# Patient Record
Sex: MALE | Race: OTHER | HISPANIC OR LATINO | URBAN - METROPOLITAN AREA
[De-identification: names, ages, dates, MRNs, and addresses within clinical notes are randomized per-mention and may not be internally consistent; named-entity substitution may affect disease eponyms.]

---

## 2018-08-16 ENCOUNTER — INPATIENT (INPATIENT)
Facility: HOSPITAL | Age: 48
LOS: 3 days | Discharge: HOME | DRG: 557 | End: 2018-08-20
Attending: STUDENT IN AN ORGANIZED HEALTH CARE EDUCATION/TRAINING PROGRAM | Admitting: HOSPITALIST
Payer: MEDICAID

## 2018-08-16 VITALS
SYSTOLIC BLOOD PRESSURE: 115 MMHG | OXYGEN SATURATION: 96 % | HEIGHT: 66 IN | HEART RATE: 64 BPM | DIASTOLIC BLOOD PRESSURE: 68 MMHG | RESPIRATION RATE: 14 BRPM | WEIGHT: 190.04 LBS | TEMPERATURE: 98 F

## 2018-08-16 LAB
ANION GAP SERPL CALC-SCNC: 15 MMOL/L — SIGNIFICANT CHANGE UP (ref 5–17)
APAP SERPL-MCNC: <2 UG/ML — LOW (ref 10–30)
BASOPHILS # BLD AUTO: 0.02 K/UL — SIGNIFICANT CHANGE UP (ref 0–0.2)
BASOPHILS NFR BLD AUTO: 0.1 % — SIGNIFICANT CHANGE UP (ref 0–2)
BUN SERPL-MCNC: 6 MG/DL — LOW (ref 7–23)
CALCIUM SERPL-MCNC: 8.4 MG/DL — LOW (ref 8.5–10.1)
CHLORIDE SERPL-SCNC: 95 MMOL/L — LOW (ref 96–108)
CO2 SERPL-SCNC: 19 MMOL/L — LOW (ref 22–31)
CREAT SERPL-MCNC: 0.78 MG/DL — SIGNIFICANT CHANGE UP (ref 0.5–1.3)
EOSINOPHIL # BLD AUTO: 0 K/UL — SIGNIFICANT CHANGE UP (ref 0–0.5)
EOSINOPHIL NFR BLD AUTO: 0 % — SIGNIFICANT CHANGE UP (ref 0–6)
ETHANOL SERPL-MCNC: <10 MG/DL — SIGNIFICANT CHANGE UP (ref 0–10)
GLUCOSE SERPL-MCNC: 114 MG/DL — HIGH (ref 70–99)
HCT VFR BLD CALC: 41.6 % — SIGNIFICANT CHANGE UP (ref 39–50)
HGB BLD-MCNC: 15 G/DL — SIGNIFICANT CHANGE UP (ref 13–17)
IMM GRANULOCYTES NFR BLD AUTO: 0.3 % — SIGNIFICANT CHANGE UP (ref 0–1.5)
LYMPHOCYTES # BLD AUTO: 1.24 K/UL — SIGNIFICANT CHANGE UP (ref 1–3.3)
LYMPHOCYTES # BLD AUTO: 8.5 % — LOW (ref 13–44)
MCHC RBC-ENTMCNC: 30.4 PG — SIGNIFICANT CHANGE UP (ref 27–34)
MCHC RBC-ENTMCNC: 36.1 GM/DL — HIGH (ref 32–36)
MCV RBC AUTO: 84.4 FL — SIGNIFICANT CHANGE UP (ref 80–100)
MONOCYTES # BLD AUTO: 0.93 K/UL — HIGH (ref 0–0.9)
MONOCYTES NFR BLD AUTO: 6.4 % — SIGNIFICANT CHANGE UP (ref 2–14)
NEUTROPHILS # BLD AUTO: 12.3 K/UL — HIGH (ref 1.8–7.4)
NEUTROPHILS NFR BLD AUTO: 84.7 % — HIGH (ref 43–77)
NRBC # BLD: 0 /100 WBCS — SIGNIFICANT CHANGE UP (ref 0–0)
PLATELET # BLD AUTO: 285 K/UL — SIGNIFICANT CHANGE UP (ref 150–400)
POTASSIUM SERPL-MCNC: 3.5 MMOL/L — SIGNIFICANT CHANGE UP (ref 3.5–5.3)
POTASSIUM SERPL-SCNC: 3.5 MMOL/L — SIGNIFICANT CHANGE UP (ref 3.5–5.3)
RBC # BLD: 4.93 M/UL — SIGNIFICANT CHANGE UP (ref 4.2–5.8)
RBC # FLD: 13.2 % — SIGNIFICANT CHANGE UP (ref 10.3–14.5)
SALICYLATES SERPL-MCNC: <1.7 MG/DL — SIGNIFICANT CHANGE UP (ref 2.8–20)
SODIUM SERPL-SCNC: 129 MMOL/L — LOW (ref 135–145)
WBC # BLD: 14.54 K/UL — HIGH (ref 3.8–10.5)
WBC # FLD AUTO: 14.54 K/UL — HIGH (ref 3.8–10.5)

## 2018-08-16 PROCEDURE — 70450 CT HEAD/BRAIN W/O DYE: CPT | Mod: 26

## 2018-08-16 PROCEDURE — 93010 ELECTROCARDIOGRAM REPORT: CPT

## 2018-08-16 RX ORDER — SODIUM CHLORIDE 9 MG/ML
1000 INJECTION INTRAMUSCULAR; INTRAVENOUS; SUBCUTANEOUS ONCE
Qty: 0 | Refills: 0 | Status: COMPLETED | OUTPATIENT
Start: 2018-08-16 | End: 2018-08-16

## 2018-08-16 RX ADMIN — SODIUM CHLORIDE 1000 MILLILITER(S): 9 INJECTION INTRAMUSCULAR; INTRAVENOUS; SUBCUTANEOUS at 23:30

## 2018-08-16 NOTE — ED PROVIDER NOTE - OBJECTIVE STATEMENT
48yo male who presents with AMS since 4am. according to the brother pt got home from work at 1am and they noticed soon after pt was not himself, went to bed and was sleeping all day, urinated on himself no vomiting, unknown drug ingestion, pt is being uncooperative and nonverbal so no further hx is able to be obtained

## 2018-08-16 NOTE — ED PROVIDER NOTE - PROGRESS NOTE DETAILS
case discussed with PA, pt needs emergent LP and with pt being agitated safest attempt is to intubate pt so LP can be performed. pt to be intubated and then LP attempted. after several attempts at LP, it was unsuccessful, pt was coughing and bucking the vent, moving too much in order to get a clean tap, will attempt again in the ICU or call anesthesia to perform the LP

## 2018-08-16 NOTE — ED PROVIDER NOTE - CARE PLAN
Principal Discharge DX:	Altered mental status, unspecified Principal Discharge DX:	Altered mental status, unspecified  Secondary Diagnosis:	Rhabdomyolysis

## 2018-08-16 NOTE — ED ADULT TRIAGE NOTE - CHIEF COMPLAINT QUOTE
"I think someone gave him something."  per family, pt came home late from work (4am when normally around 1am) and "slept all day", pt is confused, urinated on self

## 2018-08-17 DIAGNOSIS — R41.82 ALTERED MENTAL STATUS, UNSPECIFIED: ICD-10-CM

## 2018-08-17 DIAGNOSIS — E87.1 HYPO-OSMOLALITY AND HYPONATREMIA: ICD-10-CM

## 2018-08-17 DIAGNOSIS — D72.829 ELEVATED WHITE BLOOD CELL COUNT, UNSPECIFIED: ICD-10-CM

## 2018-08-17 DIAGNOSIS — J01.20 ACUTE ETHMOIDAL SINUSITIS, UNSPECIFIED: ICD-10-CM

## 2018-08-17 DIAGNOSIS — F32.9 MAJOR DEPRESSIVE DISORDER, SINGLE EPISODE, UNSPECIFIED: ICD-10-CM

## 2018-08-17 DIAGNOSIS — J96.00 ACUTE RESPIRATORY FAILURE, UNSPECIFIED WHETHER WITH HYPOXIA OR HYPERCAPNIA: ICD-10-CM

## 2018-08-17 DIAGNOSIS — M62.82 RHABDOMYOLYSIS: ICD-10-CM

## 2018-08-17 DIAGNOSIS — Z29.9 ENCOUNTER FOR PROPHYLACTIC MEASURES, UNSPECIFIED: ICD-10-CM

## 2018-08-17 LAB
ALBUMIN SERPL ELPH-MCNC: 3.1 G/DL — LOW (ref 3.3–5)
ALBUMIN SERPL ELPH-MCNC: 3.1 G/DL — LOW (ref 3.3–5)
ALP SERPL-CCNC: 71 U/L — SIGNIFICANT CHANGE UP (ref 40–120)
ALP SERPL-CCNC: 72 U/L — SIGNIFICANT CHANGE UP (ref 40–120)
ALT FLD-CCNC: 50 U/L — SIGNIFICANT CHANGE UP (ref 12–78)
ALT FLD-CCNC: 54 U/L — SIGNIFICANT CHANGE UP (ref 12–78)
AMPHET UR-MCNC: NEGATIVE — SIGNIFICANT CHANGE UP
ANION GAP SERPL CALC-SCNC: 10 MMOL/L — SIGNIFICANT CHANGE UP (ref 5–17)
ANION GAP SERPL CALC-SCNC: 9 MMOL/L — SIGNIFICANT CHANGE UP (ref 5–17)
APPEARANCE UR: CLEAR — SIGNIFICANT CHANGE UP
APTT BLD: 28.6 SEC — SIGNIFICANT CHANGE UP (ref 27.5–37.4)
AST SERPL-CCNC: 83 U/L — HIGH (ref 15–37)
AST SERPL-CCNC: 97 U/L — HIGH (ref 15–37)
BACTERIA # UR AUTO: ABNORMAL
BARBITURATES UR SCN-MCNC: NEGATIVE — SIGNIFICANT CHANGE UP
BASE EXCESS BLDA CALC-SCNC: -5.2 MMOL/L — LOW (ref -2–2)
BASOPHILS # BLD AUTO: 0.01 K/UL — SIGNIFICANT CHANGE UP (ref 0–0.2)
BASOPHILS NFR BLD AUTO: 0.1 % — SIGNIFICANT CHANGE UP (ref 0–2)
BENZODIAZ UR-MCNC: NEGATIVE — SIGNIFICANT CHANGE UP
BILIRUB SERPL-MCNC: 0.9 MG/DL — SIGNIFICANT CHANGE UP (ref 0.2–1.2)
BILIRUB SERPL-MCNC: 1.5 MG/DL — HIGH (ref 0.2–1.2)
BILIRUB UR-MCNC: NEGATIVE — SIGNIFICANT CHANGE UP
BLOOD GAS COMMENTS ARTERIAL: SIGNIFICANT CHANGE UP
BUN SERPL-MCNC: 4 MG/DL — LOW (ref 7–23)
BUN SERPL-MCNC: 5 MG/DL — LOW (ref 7–23)
CALCIUM SERPL-MCNC: 7.2 MG/DL — LOW (ref 8.5–10.1)
CALCIUM SERPL-MCNC: 7.7 MG/DL — LOW (ref 8.5–10.1)
CHLORIDE SERPL-SCNC: 111 MMOL/L — HIGH (ref 96–108)
CHLORIDE SERPL-SCNC: 112 MMOL/L — HIGH (ref 96–108)
CK MB CFR SERPL CALC: 21.5 NG/ML — HIGH (ref 0–3.6)
CK SERPL-CCNC: 5138 U/L — HIGH (ref 26–308)
CK SERPL-CCNC: 5150 U/L — HIGH (ref 26–308)
CO2 SERPL-SCNC: 20 MMOL/L — LOW (ref 22–31)
CO2 SERPL-SCNC: 22 MMOL/L — SIGNIFICANT CHANGE UP (ref 22–31)
COCAINE METAB.OTHER UR-MCNC: NEGATIVE — SIGNIFICANT CHANGE UP
COLOR SPEC: SIGNIFICANT CHANGE UP
CREAT SERPL-MCNC: 0.63 MG/DL — SIGNIFICANT CHANGE UP (ref 0.5–1.3)
CREAT SERPL-MCNC: 0.66 MG/DL — SIGNIFICANT CHANGE UP (ref 0.5–1.3)
DIFF PNL FLD: ABNORMAL
EOSINOPHIL # BLD AUTO: 0 K/UL — SIGNIFICANT CHANGE UP (ref 0–0.5)
EOSINOPHIL NFR BLD AUTO: 0 % — SIGNIFICANT CHANGE UP (ref 0–6)
EPI CELLS # UR: SIGNIFICANT CHANGE UP
GLUCOSE CSF-MCNC: 64 MG/DL — SIGNIFICANT CHANGE UP (ref 40–70)
GLUCOSE SERPL-MCNC: 103 MG/DL — HIGH (ref 70–99)
GLUCOSE SERPL-MCNC: 120 MG/DL — HIGH (ref 70–99)
GLUCOSE UR QL: NEGATIVE — SIGNIFICANT CHANGE UP
GRAM STN FLD: SIGNIFICANT CHANGE UP
HCO3 BLDA-SCNC: 20 MMOL/L — LOW (ref 23–27)
HCT VFR BLD CALC: 39.7 % — SIGNIFICANT CHANGE UP (ref 39–50)
HGB BLD-MCNC: 14.3 G/DL — SIGNIFICANT CHANGE UP (ref 13–17)
HOROWITZ INDEX BLDA+IHG-RTO: SIGNIFICANT CHANGE UP
IMM GRANULOCYTES NFR BLD AUTO: 0.5 % — SIGNIFICANT CHANGE UP (ref 0–1.5)
INR BLD: 1.1 RATIO — SIGNIFICANT CHANGE UP (ref 0.88–1.16)
KETONES UR-MCNC: ABNORMAL
LACTATE SERPL-SCNC: 1.3 MMOL/L — SIGNIFICANT CHANGE UP (ref 0.7–2)
LEUKOCYTE ESTERASE UR-ACNC: ABNORMAL
LYMPHOCYTES # BLD AUTO: 0.29 K/UL — LOW (ref 1–3.3)
LYMPHOCYTES # BLD AUTO: 2.5 % — LOW (ref 13–44)
MAGNESIUM SERPL-MCNC: 2.6 MG/DL — SIGNIFICANT CHANGE UP (ref 1.6–2.6)
MCHC RBC-ENTMCNC: 30.9 PG — SIGNIFICANT CHANGE UP (ref 27–34)
MCHC RBC-ENTMCNC: 36 GM/DL — SIGNIFICANT CHANGE UP (ref 32–36)
MCV RBC AUTO: 85.7 FL — SIGNIFICANT CHANGE UP (ref 80–100)
METHADONE UR-MCNC: NEGATIVE — SIGNIFICANT CHANGE UP
MONOCYTES # BLD AUTO: 0.36 K/UL — SIGNIFICANT CHANGE UP (ref 0–0.9)
MONOCYTES NFR BLD AUTO: 3 % — SIGNIFICANT CHANGE UP (ref 2–14)
NEUTROPHILS # BLD AUTO: 11.1 K/UL — HIGH (ref 1.8–7.4)
NEUTROPHILS NFR BLD AUTO: 93.9 % — HIGH (ref 43–77)
NITRITE UR-MCNC: NEGATIVE — SIGNIFICANT CHANGE UP
OPIATES UR-MCNC: NEGATIVE — SIGNIFICANT CHANGE UP
OSMOLALITY SERPL: 289 MOS/KG — SIGNIFICANT CHANGE UP (ref 275–300)
PCO2 BLDA: 33 MMHG — SIGNIFICANT CHANGE UP (ref 32–46)
PCP SPEC-MCNC: SIGNIFICANT CHANGE UP
PCP UR-MCNC: NEGATIVE — SIGNIFICANT CHANGE UP
PH BLDA: 7.38 — SIGNIFICANT CHANGE UP (ref 7.35–7.45)
PH UR: 7 — SIGNIFICANT CHANGE UP (ref 5–8)
PHOSPHATE SERPL-MCNC: 0.8 MG/DL — CRITICAL LOW (ref 2.5–4.5)
PHOSPHATE SERPL-MCNC: 2.6 MG/DL — SIGNIFICANT CHANGE UP (ref 2.5–4.5)
PLATELET # BLD AUTO: 293 K/UL — SIGNIFICANT CHANGE UP (ref 150–400)
PO2 BLDA: 179 MMHG — HIGH (ref 74–108)
POTASSIUM SERPL-MCNC: 3.3 MMOL/L — LOW (ref 3.5–5.3)
POTASSIUM SERPL-MCNC: 3.6 MMOL/L — SIGNIFICANT CHANGE UP (ref 3.5–5.3)
POTASSIUM SERPL-SCNC: 3.3 MMOL/L — LOW (ref 3.5–5.3)
POTASSIUM SERPL-SCNC: 3.6 MMOL/L — SIGNIFICANT CHANGE UP (ref 3.5–5.3)
PROCALCITONIN SERPL-MCNC: <0.05 NG/ML — HIGH (ref 0–0.04)
PROT CSF-MCNC: 37 MG/DL — SIGNIFICANT CHANGE UP (ref 15–45)
PROT SERPL-MCNC: 6.7 G/DL — SIGNIFICANT CHANGE UP (ref 6–8.3)
PROT SERPL-MCNC: 6.9 G/DL — SIGNIFICANT CHANGE UP (ref 6–8.3)
PROT UR-MCNC: NEGATIVE — SIGNIFICANT CHANGE UP
PROTHROM AB SERPL-ACNC: 12 SEC — SIGNIFICANT CHANGE UP (ref 9.8–12.7)
RAPID RVP RESULT: SIGNIFICANT CHANGE UP
RBC # BLD: 4.63 M/UL — SIGNIFICANT CHANGE UP (ref 4.2–5.8)
RBC # FLD: 14 % — SIGNIFICANT CHANGE UP (ref 10.3–14.5)
RBC CASTS # UR COMP ASSIST: ABNORMAL /HPF (ref 0–4)
SAO2 % BLDA: 100 % — HIGH (ref 92–96)
SODIUM SERPL-SCNC: 142 MMOL/L — SIGNIFICANT CHANGE UP (ref 135–145)
SODIUM SERPL-SCNC: 142 MMOL/L — SIGNIFICANT CHANGE UP (ref 135–145)
SP GR SPEC: 1 — LOW (ref 1.01–1.02)
SPECIMEN SOURCE: SIGNIFICANT CHANGE UP
THC UR QL: NEGATIVE — SIGNIFICANT CHANGE UP
TROPONIN I SERPL-MCNC: 0.04 NG/ML — SIGNIFICANT CHANGE UP (ref 0.01–0.04)
UROBILINOGEN FLD QL: NEGATIVE — SIGNIFICANT CHANGE UP
WBC # BLD: 11.82 K/UL — HIGH (ref 3.8–10.5)
WBC # FLD AUTO: 11.82 K/UL — HIGH (ref 3.8–10.5)
WBC UR QL: ABNORMAL

## 2018-08-17 PROCEDURE — 71045 X-RAY EXAM CHEST 1 VIEW: CPT | Mod: 26,77

## 2018-08-17 PROCEDURE — 12345: CPT | Mod: NC

## 2018-08-17 PROCEDURE — 31500 INSERT EMERGENCY AIRWAY: CPT

## 2018-08-17 PROCEDURE — 93010 ELECTROCARDIOGRAM REPORT: CPT

## 2018-08-17 PROCEDURE — 99285 EMERGENCY DEPT VISIT HI MDM: CPT | Mod: 25

## 2018-08-17 PROCEDURE — 71045 X-RAY EXAM CHEST 1 VIEW: CPT | Mod: 26

## 2018-08-17 PROCEDURE — 99291 CRITICAL CARE FIRST HOUR: CPT

## 2018-08-17 PROCEDURE — 99223 1ST HOSP IP/OBS HIGH 75: CPT | Mod: GC,AI

## 2018-08-17 RX ORDER — POTASSIUM CHLORIDE 20 MEQ
40 PACKET (EA) ORAL ONCE
Qty: 0 | Refills: 0 | Status: COMPLETED | OUTPATIENT
Start: 2018-08-17 | End: 2018-08-17

## 2018-08-17 RX ORDER — POTASSIUM CHLORIDE 20 MEQ
40 PACKET (EA) ORAL EVERY 4 HOURS
Qty: 0 | Refills: 0 | Status: COMPLETED | OUTPATIENT
Start: 2018-08-17 | End: 2018-08-18

## 2018-08-17 RX ORDER — SODIUM CHLORIDE 9 MG/ML
1000 INJECTION INTRAMUSCULAR; INTRAVENOUS; SUBCUTANEOUS
Qty: 0 | Refills: 0 | Status: DISCONTINUED | OUTPATIENT
Start: 2018-08-17 | End: 2018-08-18

## 2018-08-17 RX ORDER — DEXAMETHASONE 0.5 MG/5ML
10 ELIXIR ORAL EVERY 6 HOURS
Qty: 0 | Refills: 0 | Status: DISCONTINUED | OUTPATIENT
Start: 2018-08-17 | End: 2018-08-17

## 2018-08-17 RX ORDER — MIDAZOLAM HYDROCHLORIDE 1 MG/ML
2 INJECTION, SOLUTION INTRAMUSCULAR; INTRAVENOUS ONCE
Qty: 0 | Refills: 0 | Status: DISCONTINUED | OUTPATIENT
Start: 2018-08-17 | End: 2018-08-17

## 2018-08-17 RX ORDER — CEFTRIAXONE 500 MG/1
2 INJECTION, POWDER, FOR SOLUTION INTRAMUSCULAR; INTRAVENOUS ONCE
Qty: 0 | Refills: 0 | Status: COMPLETED | OUTPATIENT
Start: 2018-08-17 | End: 2018-08-17

## 2018-08-17 RX ORDER — POTASSIUM PHOSPHATE, MONOBASIC POTASSIUM PHOSPHATE, DIBASIC 236; 224 MG/ML; MG/ML
15 INJECTION, SOLUTION INTRAVENOUS EVERY 6 HOURS
Qty: 0 | Refills: 0 | Status: DISCONTINUED | OUTPATIENT
Start: 2018-08-17 | End: 2018-08-17

## 2018-08-17 RX ORDER — PROPOFOL 10 MG/ML
40 INJECTION, EMULSION INTRAVENOUS
Qty: 1000 | Refills: 0 | Status: DISCONTINUED | OUTPATIENT
Start: 2018-08-17 | End: 2018-08-17

## 2018-08-17 RX ORDER — DEXMEDETOMIDINE HYDROCHLORIDE IN 0.9% SODIUM CHLORIDE 4 UG/ML
0.7 INJECTION INTRAVENOUS
Qty: 200 | Refills: 0 | Status: DISCONTINUED | OUTPATIENT
Start: 2018-08-17 | End: 2018-08-18

## 2018-08-17 RX ORDER — CEFTRIAXONE 500 MG/1
2 INJECTION, POWDER, FOR SOLUTION INTRAMUSCULAR; INTRAVENOUS EVERY 12 HOURS
Qty: 0 | Refills: 0 | Status: DISCONTINUED | OUTPATIENT
Start: 2018-08-17 | End: 2018-08-18

## 2018-08-17 RX ORDER — FENTANYL CITRATE 50 UG/ML
50 INJECTION INTRAVENOUS ONCE
Qty: 0 | Refills: 0 | Status: DISCONTINUED | OUTPATIENT
Start: 2018-08-17 | End: 2018-08-17

## 2018-08-17 RX ORDER — ACYCLOVIR SODIUM 500 MG
850 VIAL (EA) INTRAVENOUS ONCE
Qty: 0 | Refills: 0 | Status: COMPLETED | OUTPATIENT
Start: 2018-08-17 | End: 2018-08-17

## 2018-08-17 RX ORDER — CHLORHEXIDINE GLUCONATE 213 G/1000ML
15 SOLUTION TOPICAL
Qty: 0 | Refills: 0 | Status: DISCONTINUED | OUTPATIENT
Start: 2018-08-17 | End: 2018-08-17

## 2018-08-17 RX ORDER — POTASSIUM PHOSPHATE, MONOBASIC POTASSIUM PHOSPHATE, DIBASIC 236; 224 MG/ML; MG/ML
30 INJECTION, SOLUTION INTRAVENOUS ONCE
Qty: 0 | Refills: 0 | Status: COMPLETED | OUTPATIENT
Start: 2018-08-17 | End: 2018-08-17

## 2018-08-17 RX ORDER — HEPARIN SODIUM 5000 [USP'U]/ML
5000 INJECTION INTRAVENOUS; SUBCUTANEOUS EVERY 8 HOURS
Qty: 0 | Refills: 0 | Status: DISCONTINUED | OUTPATIENT
Start: 2018-08-17 | End: 2018-08-20

## 2018-08-17 RX ORDER — SODIUM CHLORIDE 9 MG/ML
1000 INJECTION INTRAMUSCULAR; INTRAVENOUS; SUBCUTANEOUS ONCE
Qty: 0 | Refills: 0 | Status: COMPLETED | OUTPATIENT
Start: 2018-08-17 | End: 2018-08-17

## 2018-08-17 RX ORDER — VANCOMYCIN HCL 1 G
1000 VIAL (EA) INTRAVENOUS EVERY 12 HOURS
Qty: 0 | Refills: 0 | Status: DISCONTINUED | OUTPATIENT
Start: 2018-08-17 | End: 2018-08-18

## 2018-08-17 RX ORDER — ACYCLOVIR SODIUM 500 MG
850 VIAL (EA) INTRAVENOUS EVERY 8 HOURS
Qty: 0 | Refills: 0 | Status: DISCONTINUED | OUTPATIENT
Start: 2018-08-17 | End: 2018-08-18

## 2018-08-17 RX ORDER — POTASSIUM CHLORIDE 20 MEQ
40 PACKET (EA) ORAL ONCE
Qty: 0 | Refills: 0 | Status: DISCONTINUED | OUTPATIENT
Start: 2018-08-17 | End: 2018-08-17

## 2018-08-17 RX ORDER — PANTOPRAZOLE SODIUM 20 MG/1
40 TABLET, DELAYED RELEASE ORAL DAILY
Qty: 0 | Refills: 0 | Status: DISCONTINUED | OUTPATIENT
Start: 2018-08-17 | End: 2018-08-18

## 2018-08-17 RX ADMIN — Medication 102 MILLIGRAM(S): at 11:30

## 2018-08-17 RX ADMIN — Medication 40 MILLIEQUIVALENT(S): at 22:18

## 2018-08-17 RX ADMIN — CEFTRIAXONE 100 GRAM(S): 500 INJECTION, POWDER, FOR SOLUTION INTRAMUSCULAR; INTRAVENOUS at 14:59

## 2018-08-17 RX ADMIN — DEXMEDETOMIDINE HYDROCHLORIDE IN 0.9% SODIUM CHLORIDE 13.3 MICROGRAM(S)/KG/HR: 4 INJECTION INTRAVENOUS at 15:25

## 2018-08-17 RX ADMIN — SODIUM CHLORIDE 100 MILLILITER(S): 9 INJECTION INTRAMUSCULAR; INTRAVENOUS; SUBCUTANEOUS at 15:05

## 2018-08-17 RX ADMIN — DEXMEDETOMIDINE HYDROCHLORIDE IN 0.9% SODIUM CHLORIDE 13.3 MICROGRAM(S)/KG/HR: 4 INJECTION INTRAVENOUS at 18:16

## 2018-08-17 RX ADMIN — Medication 117 MILLIGRAM(S): at 12:12

## 2018-08-17 RX ADMIN — CEFTRIAXONE 100 GRAM(S): 500 INJECTION, POWDER, FOR SOLUTION INTRAMUSCULAR; INTRAVENOUS at 02:37

## 2018-08-17 RX ADMIN — Medication 40 MILLIEQUIVALENT(S): at 11:29

## 2018-08-17 RX ADMIN — PROPOFOL 18.24 MICROGRAM(S)/KG/MIN: 10 INJECTION, EMULSION INTRAVENOUS at 12:45

## 2018-08-17 RX ADMIN — POTASSIUM PHOSPHATE, MONOBASIC POTASSIUM PHOSPHATE, DIBASIC 85 MILLIMOLE(S): 236; 224 INJECTION, SOLUTION INTRAVENOUS at 10:08

## 2018-08-17 RX ADMIN — Medication 102 MILLIGRAM(S): at 17:43

## 2018-08-17 RX ADMIN — HEPARIN SODIUM 5000 UNIT(S): 5000 INJECTION INTRAVENOUS; SUBCUTANEOUS at 22:18

## 2018-08-17 RX ADMIN — Medication 167 MILLIGRAM(S): at 03:28

## 2018-08-17 RX ADMIN — MIDAZOLAM HYDROCHLORIDE 2 MILLIGRAM(S): 1 INJECTION, SOLUTION INTRAMUSCULAR; INTRAVENOUS at 13:24

## 2018-08-17 RX ADMIN — Medication 250 MILLIGRAM(S): at 05:01

## 2018-08-17 RX ADMIN — CHLORHEXIDINE GLUCONATE 15 MILLILITER(S): 213 SOLUTION TOPICAL at 05:01

## 2018-08-17 RX ADMIN — FENTANYL CITRATE 50 MICROGRAM(S): 50 INJECTION INTRAVENOUS at 13:25

## 2018-08-17 RX ADMIN — Medication 102 MILLIGRAM(S): at 05:53

## 2018-08-17 RX ADMIN — SODIUM CHLORIDE 1000 MILLILITER(S): 9 INJECTION INTRAMUSCULAR; INTRAVENOUS; SUBCUTANEOUS at 02:31

## 2018-08-17 RX ADMIN — PROPOFOL 18.24 MICROGRAM(S)/KG/MIN: 10 INJECTION, EMULSION INTRAVENOUS at 08:40

## 2018-08-17 RX ADMIN — Medication 250 MILLIGRAM(S): at 17:07

## 2018-08-17 RX ADMIN — DEXMEDETOMIDINE HYDROCHLORIDE IN 0.9% SODIUM CHLORIDE 13.3 MICROGRAM(S)/KG/HR: 4 INJECTION INTRAVENOUS at 22:17

## 2018-08-17 RX ADMIN — PANTOPRAZOLE SODIUM 40 MILLIGRAM(S): 20 TABLET, DELAYED RELEASE ORAL at 11:29

## 2018-08-17 RX ADMIN — Medication 117 MILLIGRAM(S): at 18:17

## 2018-08-17 NOTE — H&P ADULT - PROBLEM SELECTOR PLAN 6
IMPROVE VTE Individual Risk Assessment  RISK                                                                Points  [  ] Previous VTE                                                  3  [  ] Thrombophilia                                               2  [  ] Lower limb paralysis                                      2        (unable to hold up >15 seconds)    [  ] Current Cancer                                              2         (within 6 months)  [  ] Immobilization > 24 hrs                                1  [  ] ICU/CCU stay > 24 hours                              1  [  ] Age > 60                                                      1  IMPROVE VTE Score ____0_____  DVT ppx: lovenox 40mg subq Chronic. Patient not on medications at home

## 2018-08-17 NOTE — PROGRESS NOTE ADULT - ASSESSMENT
48 yo M pmh depression presents with altered mental status. Admit to ICU for acute vent dependent respiratory failure,  altered mental status unclear etiology  r/o meningoencephalitis  vs metabolic encephalopathy vs other causes ( UTOX negative) 48 yo M pmh depression presents with  acute vent dependent respiratory failure,  altered mental status unclear etiology  r/o meningoencephalitis  vs metabolic encephalopathy vs other causes ( UTOX negative)

## 2018-08-17 NOTE — CONSULT NOTE ADULT - PROBLEM SELECTOR RECOMMENDATION 3
LP for cell count, diff, gram stain, culture, protein, glucose, meningoencephalitis PCR panel  Serum WNV  HIV test  Continue ceftriaxone 2q12  Continue acyclovir 10mg/kn IVPB Q8H pending PCR above  Monitor creatinine closely-- rhabo + high dose ACV  Continue decadron- presume just documented late and given at the appropriate time (before Abx). If this is untrue, stop steroids  No role for rifampin or ampicilln at present  Droplet precautions maybe discontinued 24h after first dose of ceftriaxone

## 2018-08-17 NOTE — ED ADULT NURSE NOTE - NSIMPLEMENTINTERV_GEN_ALL_ED
Implemented All Fall with Harm Risk Interventions:  [place] to call system. Call bell, personal items and telephone within reach. Instruct patient to call for assistance. Room bathroom lighting operational. Non-slip footwear when patient is off stretcher. Physically safe environment: no spills, clutter or unnecessary equipment. Stretcher in lowest position, wheels locked, appropriate side rails in place. Provide visual cue, wrist band, yellow gown, etc. Monitor gait and stability. Monitor for mental status changes and reorient to person, place, and time. Review medications for side effects contributing to fall risk. Reinforce activity limits and safety measures with patient and family. Provide visual clues: red socks.

## 2018-08-17 NOTE — H&P ADULT - NSHPPHYSICALEXAM_GEN_ALL_CORE
T(C): 37.5 (08-17-18 @ 00:28), Max: 37.5 (08-17-18 @ 00:28)  HR: 61 (08-17-18 @ 01:42) (61 - 64)  BP: 103/46 (08-17-18 @ 01:42) (103/46 - 115/68)  RR: 23 (08-17-18 @ 01:42) (14 - 23)  SpO2: 98% (08-17-18 @ 01:42) (96% - 98%)    Limited physical exam due to patient being obtunded from medication.   GENERAL: well nourished however patient is obtunded due to medication  EYES: sclera clear, no exudates  ENMT: moist mucous membranes  NECK: supple, soft  LUNGS: good air entry bilaterally, clear to auscultation, symmetric breath sounds, no wheezing or rhonchi appreciated  HEART: soft S1/S2, regular rate and rhythm, no murmurs noted, no lower extremity edema  GASTROINTESTINAL: abdomen is soft, nondistended, normoactive bowel sounds, no palpable masses  INTEGUMENT: good skin turgor, no lesions noted  MUSCULOSKELETAL: no clubbing or cyanosis, no obvious deformity  NEUROLOGIC: obtunded, unable to assess mental status   HEME/LYMPH: no palpable supraclavicular nodules, no obvious ecchymosis or petechiae

## 2018-08-17 NOTE — PROGRESS NOTE ADULT - PROBLEM SELECTOR PLAN 4
- R/o infection   -F/u cultures  -Continue antibiotics  -Plan as per ICU Unclear etiology, suspect dehydration. Resolved   Monitor

## 2018-08-17 NOTE — PROGRESS NOTE ADULT - PROBLEM SELECTOR PLAN 2
Unclear origin. Possibly from drug overdose vs unknown trauma  -IVFs  -Continue to trend creatine kinase  -Plan as per ICU Vent Support   Plan for weaning per ICU

## 2018-08-17 NOTE — CONSULT NOTE ADULT - SUBJECTIVE AND OBJECTIVE BOX
Patient is a 47y old  Male hx depression who presents with a chief complaint of AMS.  As per ED report, pt lives with mother, father, and another brother. Pt came home from work at 4am 8/16/18 and went strait to sleep for several hours. Reportedly he woke up confused,  opened a dresser drawer and urinated into. Pt woke up a few hours later but still confused. Family decided to bring him into the ED.  Urine toxicology and alcohol level neg. Brain CT no acute pathology.  In ED, pt was combative and confused. He was given Valium 5mg IVX2 doses and placed on 4 point restraints for safety. LP was indicated for acute change in mental status.  Spoke to E-ICU and Dr Galvez and decided to intubate patient for procedure and safety concerns. After intubation, LP was attempted by Dr Galvez and myself but unsuccessful  Procedure aborted and pt was transported to ICU. Pt remains intubated and sedated on Diprivan.    PAST MEDICAL & SURGICAL HISTORY:  Depression  No significant past surgical history      BRIEF HOSPITAL COURSE:    Review of Systems:   UTO                                              ICU Vital Signs Last 24 Hrs  T(C): 37.5 (17 Aug 2018 00:28), Max: 37.5 (17 Aug 2018 00:28)  T(F): 99.5 (17 Aug 2018 00:28), Max: 99.5 (17 Aug 2018 00:28)  HR: 65 (17 Aug 2018 03:07) (61 - 74)  BP: 103/46 (17 Aug 2018 01:42) (103/46 - 115/68)  BP(mean): --  ABP: --  ABP(mean): --  RR: 23 (17 Aug 2018 01:42) (14 - 23)  SpO2: 100% (17 Aug 2018 03:07) (96% - 100%)    Physical Examination:     General: well developed, intubated, NAD    HEENT: normacephalic, atraumatic, pupils equal and reactive     PULM: CTA b/l     CVS: s1s2 RRR    ABD: soft +BS nt nd     EXT: no edema, moves all extremities     SKIN: no cyanosis     Neuro:  sedated       Mode: AC/ CMV (Assist Control/ Continuous Mandatory Ventilation)  RR (machine): 14  TV (machine): 450  FiO2: 60  PEEP: 5  ITime: 1  MAP: 11  PIP: 22    Mode: AC/ CMV (Assist Control/ Continuous Mandatory Ventilation), RR (machine): 14, TV (machine): 450, FiO2: 60, PEEP: 5, ITime: 1, MAP: 11, PIP: 22  LABS:                        15.0   14.54 )-----------( 285      ( 16 Aug 2018 23:13 )             41.6     08-16    129<L>  |  95<L>  |  6<L>  ----------------------------<  114<H>  3.5   |  19<L>  |  0.78    Ca    8.4<L>      16 Aug 2018 23:13        CARDIAC MARKERS ( 17 Aug 2018 00:21 )  x     / x     / 5150 U/L / x     / x          CAPILLARY BLOOD GLUCOSE      POCT Blood Glucose.: 118 mg/dL (16 Aug 2018 23:10)          CULTURES:      Medications:  acyclovir IVPB 850 milliGRAM(s) IV Intermittent once  acyclovir IVPB 850 milliGRAM(s) IV Intermittent every 8 hours  cefTRIAXone   IVPB 2 Gram(s) IV Intermittent every 12 hours  vancomycin  IVPB 1000 milliGRAM(s) IV Intermittent every 12 hours                  dexamethasone  Injectable 10 milliGRAM(s) IV Push every 6 hours    sodium chloride 0.9% Bolus 1000 milliLiter(s) IV Bolus once  sodium chloride 0.9%. 1000 milliLiter(s) IV Continuous <Continuous>            RADIOLOGY/IMAGING/ECHO    Critical care point of care ultrasound:    Assessment/Plan:  47y old  Male hx depression adm w/ AMS, r/o meningitis.    ID-will need LP, s/p failed attempt, Acyclovir/Ceftriaxon/Vanco regimen, Decadorn 10mg IV q6X4 days, f/u Bcx's, consider ID eval   Cv-pt HD stable   Pulm-cont vent support, f/u CXR and ABG, wean FIO2 to keep O2S>94%  GI-keep NPO  Renal-Cr stable, strict I/O's  Neuro-cont Diprivan to keep RASS-3  GI/DVT ppx     Discussed w/ E-ICU and agrees with plan         Critical Care time: 70min   (Reviewing data, imaging, discussing with multidisciplinary team, non inclusive of procedures, discussing goals of care with patient/family)

## 2018-08-17 NOTE — H&P ADULT - PROBLEM SELECTOR PLAN 3
Possibly 2/2 dehydration vs drug toxicity   -Plan as per ICU Possibly 2/2 dehydration vs drug toxicity   acute, moderate severeity. Correction per ICU team  received 2 L NS. recommend check BMP now and adjust accordingly

## 2018-08-17 NOTE — ED ADULT NURSE REASSESSMENT NOTE - NS ED NURSE REASSESS COMMENT FT1
Pt intermittently agitated, tearing clothing off, attempting to get off stretcher, attempting to tear out IV. Pt noted with urinary incontinence, Urinary catheter inserted #16 Fr by MD. 4 point restraint applied upon order of MD for pt safety, 1:1 monitoring initiated per protocol

## 2018-08-17 NOTE — H&P ADULT - PROBLEM SELECTOR PLAN 2
Unclear origin. Possibly from drug overdose vs unknown trauma  -IVFs  -Continue to trend creatine kinase  -Plan as per ICU

## 2018-08-17 NOTE — CHART NOTE - NSCHARTNOTEFT_GEN_A_CORE
Resident Progress Note    case discussed in AM roud and OGT need to be place for deliver of medications        Vital Signs Last 24 Hrs  T(C): 37.5 (17 Aug 2018 08:05), Max: 37.5 (17 Aug 2018 00:28)  T(F): 99.5 (17 Aug 2018 08:05), Max: 99.5 (17 Aug 2018 00:28)  HR: 67 (17 Aug 2018 11:00) (61 - 81)  BP: 99/54 (17 Aug 2018 11:00) (95/53 - 120/60)  BP(mean): 72 (17 Aug 2018 11:00) (69 - 83)  RR: 21 (17 Aug 2018 11:00) (14 - 25)  SpO2: 97% (17 Aug 2018 11:00) (95% - 100%)    Abd- + bowel sounds, softly distended, No guarding    OGT placed with ICU PA and medical student without difficulty, flushed with air and auscultated over stomach to verify placement.    Plan-  -follow up CXR showed OGT in place.   -NPO except medications.

## 2018-08-17 NOTE — H&P ADULT - PROBLEM SELECTOR PLAN 7
IMPROVE VTE Individual Risk Assessment  RISK                                                                Points  [  ] Previous VTE                                                  3  [  ] Thrombophilia                                               2  [  ] Lower limb paralysis                                      2        (unable to hold up >15 seconds)    [  ] Current Cancer                                              2         (within 6 months)  [  ] Immobilization > 24 hrs                                1  [  ] ICU/CCU stay > 24 hours                              1  [  ] Age > 60                                                      1  IMPROVE VTE Score ____0_____  DVT ppx: lovenox 40mg subq

## 2018-08-17 NOTE — PROVIDER CONTACT NOTE (EICU) - BACKGROUND
46 yo M pmh depression presents with altered mental status. History is obtained from patient's older brother, Sammy, via telephone due to patient being obtunded. Patient lives at home with mother and another brother. Patient returned home from work at 4 am 8/16/18. Went straight to bed and did not wake up for a few hours. Patient's mother was concerned and called brother Sammy who assumed patient was tired from working so many hours at [employer]. Patient woke up a few hours later and opened dresser drawer and urinated into and was making statements that did not make sense. Patient then went back to sleep. Once patient woke up again a few hours after that, patient was still altered and patient's family brought him to the ED. In the ED, patient was very combative, not allowing physical exams and remaining in fetal position. Patient was given valium and is currently obtunded and not responding to questioning. Of note, patient brother states that patient does not smoke, no ETOH hx, no illicit drug use. No travel hx, no recent illness. Brother states that patient walks to work and occasionally walks through woody, grass areas. No other hx available at this time.    In the ED, VS temp 98, HR 64, /46, RR 23, SpO2 100 on room air. WBC 14.54, Hg 15.0, Hct 41.6, platelets 285, neutrophil % 84.7, lactate 1.3, procalcitonin <0.05, Na 129, K 3.5, Cl 95, Co2 19, BUN 6, Cr 0.78, glucose 114, calcium 8.4, creatine kinase 5150, Drug screen negative, salicylate <1.7, acetaminophen <2, alcohol <10

## 2018-08-17 NOTE — H&P ADULT - PROBLEM SELECTOR PLAN 1
R/o meningitis vs drug toxicity   -Admit to ICU   -Drug tox screen negative   -Consider lumbar puncture to r/o meningitis   -Continue ceftriaxone, acyclovir, vancomycin   - R/o meningitis vs metabolic encephalopathy vs acute hyponatremia vs drug toxicity   -Admit to ICU   -Drug tox screen negative   -Consider lumbar puncture to r/o meningitis   -Continue ceftriaxone, acyclovir, vancomycin   -Consider lyme panel, [place], RPR  -Consider thyroid panel, hepatitis panel   -F/u blood cultures, urine cultures   -Plan as per ICU

## 2018-08-17 NOTE — ED PROCEDURE NOTE - CPROC ED TIME OUT STATEMENT1
“Patient's name, , procedure and correct site were confirmed during the [place] Timeout.”
“Patient's name, , procedure and correct site were confirmed during the [place] Timeout.”

## 2018-08-17 NOTE — ED ADULT NURSE NOTE - TEMPLATE
Patient reports increased depression.  Patient is tearful, isolating, feels like others are targeting her.  Patient tells she is not feeling suicidal \"at this point.\"  Patient denies thoughts to harm others, but says, \"I fly off the handle sometimes.\"  Patient says, \"I feel my depression building up on me.\"  Patient says, \"I don't want to go back into the hospital.\"    Patient requests to go on an antidepressant.  Advised patient that doxepin can help with depression, but patient thinks she needs something else.  Patient did not seem to be aware of why provider discontinued her duloxetine, or that it had been discontinued.  She did verify that she has not had it for awhile.    Discussed with patient that she may want to call Intake to discuss admission to the [facility] hospital program to prevent her depression from getting worse, possibly requiring inpatient treatment.  Patient did not seem too interested in that, but did take the phone number for [facility] Intake.  Patient also tells that she would like to see a new therapist.  Number for Central Scheduling given.   Neuro

## 2018-08-17 NOTE — PROVIDER CONTACT NOTE (EICU) - ASSESSMENT
Acute encephalopathy/R/o CNS infection , bacterial meningitis, viral encephalitis   In ED, required 4 point restraints for agitation. I recommended intubation for LP. intubated , LP attempted but unable to get CSF. CT head negative

## 2018-08-17 NOTE — H&P ADULT - ASSESSMENT
48 yo M pmh depression presents with altered mental status. Admit to ICU for altered mental status, r/o meningitis vs metabolic encephalopathy vs acute hyponatremia vs drug toxicity.

## 2018-08-17 NOTE — H&P ADULT - HISTORY OF PRESENT ILLNESS
[address] fell asleep 48 yo M pmh depression presents with altered mental status. History is obtained from patient's older brother, Sammy, via telephone due to patient being obtunded. Patient's brother states that 48 yo M pmh depression presents with altered mental status. History is obtained from patient's older brother, Sammy, via telephone due to patient being obtunded. Patient lives at home with mother and another brother. Patient returned home from work at 4 am 8/16/18. Went straight to bed and did not wake up for a few hours. Patient's mother was concerned and called brother Sammy who assumed patient was tired from working so many hours at [employer]. Patient woke up a few hours later and opened dresser drawer and urinated into and was making statements that did not make sense. Patient then went back to sleep. Once patient woke up again a few hours after that, patient was still altered and patient's family brought him to the ED. In the ED, patient was very combative, not allowing physical exams and remaining in fetal position. Patient was given valium and is currently obtunded and not responding to questioning. Of note, patient brother states that patient does not smoke, no ETOH hx, no illicit drug use. No travel hx, no recent illness. Brother states that patient walks to work and occasionally walks through woody, grass areas. No other hx available at this time. 48 yo M pmh depression presents with altered mental status. History is obtained from patient's older brother, Sammy, via telephone due to patient being obtunded. Patient lives at home with mother and another brother. Patient returned home from work at 4 am 8/16/18. Went straight to bed and did not wake up for a few hours. Patient's mother was concerned and called brother Sammy who assumed patient was tired from working so many hours at [employer]. Patient woke up a few hours later and opened dresser drawer and urinated into and was making statements that did not make sense. Patient then went back to sleep. Once patient woke up again a few hours after that, patient was still altered and patient's family brought him to the ED. In the ED, patient was very combative, not allowing physical exams and remaining in fetal position. Patient was given valium and is currently obtunded and not responding to questioning. Of note, patient brother states that patient does not smoke, no ETOH hx, no illicit drug use. No travel hx, no recent illness. Brother states that patient walks to work and occasionally walks through woody, grass areas. No other hx available at this time.    In the ED, VS temp 98, HR 64, /46, RR 23, SpO2 100 on room air. WBC 14.54, Hg 15.0, Hct 41.6, platelets 285, neutrophil % 84.7, lactate 1.3, procalcitonin <0.05, Na 129, K 3.5, Cl 95, Co2 19, BUN 6, Cr 0.78, glucose 114, calcium 8.4, creatine kinase 5150, Drug screen negative, salicylate <1.7, acetaminophen <2, alcohol <10    Imaging:  CT head no contrast: No acute intracranial hemorrhage, mass effect, or evidence of acute vascular territorial infarction.If clinical symptoms persist or worsen, more sensitive evaluation with brain MRI may be obtained, if no contraindications exist.  EKG: unoffical read: NSR at 70 bpm     In the ED, received valium 5 mg x 2, NaCl 1000mL x 1.

## 2018-08-17 NOTE — ED ADULT NURSE NOTE - OBJECTIVE STATEMENT
Pt brought to ED by family with c/c altered mental status all day. Family states pt was non verbal or inappropriately verbally responsive and confused all day. On arrival pt noted with purposeful movements, resists IV and cath procedures, nonverbal, keeping eyes closed throughout. Rectal temp 99.5, MD aware

## 2018-08-17 NOTE — DIETITIAN INITIAL EVALUATION ADULT. - PROBLEM SELECTOR PLAN 1
R/o meningitis vs metabolic encephalopathy vs acute hyponatremia vs drug toxicity   -Admit to ICU   -Drug tox screen negative   -Consider lumbar puncture to r/o meningitis   -Continue ceftriaxone, acyclovir, vancomycin   -Consider lyme panel, [place], RPR  -Consider thyroid panel, hepatitis panel   -F/u blood cultures, urine cultures   -Plan as per ICU

## 2018-08-17 NOTE — CONSULT NOTE ADULT - ASSESSMENT
Altered mental status  No fevers  WBC elevated, nonspecific in this setting  Elevated CK, some minimal traumatic findings on knees not enough to explain CK  ?Stupor -> rhabdo  Being assessed for substance abuse/overdose by CCM  Only way to exclude meningits/encephalitis is with an LP

## 2018-08-17 NOTE — DIETITIAN INITIAL EVALUATION ADULT. - PROBLEM SELECTOR PLAN 3
Possibly 2/2 dehydration vs drug toxicity   acute, moderate severeity. Correction per ICU team  received 2 L NS. recommend check BMP now and adjust accordingly

## 2018-08-17 NOTE — PROGRESS NOTE ADULT - SUBJECTIVE AND OBJECTIVE BOX
Patient is a 47y old  Male who presents with a chief complaint of change in mental status (17 Aug 2018 03:42)       INTERVAL HPI/ OVERNIGHT EVENTS: Intubated, sedated.    MEDICATIONS  (STANDING):  acyclovir IVPB 850 milliGRAM(s) IV Intermittent every 8 hours  cefTRIAXone   IVPB 2 Gram(s) IV Intermittent every 12 hours  chlorhexidine 0.12% Liquid 15 milliLiter(s) Swish and Spit two times a day  dexamethasone  IVPB 10 milliGRAM(s) IV Intermittent every 6 hours  pantoprazole  Injectable 40 milliGRAM(s) IV Push daily  potassium chloride   Powder 40 milliEquivalent(s) Oral once  propofol Infusion 40 MICROgram(s)/kG/Min (18.24 mL/Hr) IV Continuous <Continuous>  sodium chloride 0.9%. 1000 milliLiter(s) (100 mL/Hr) IV Continuous <Continuous>  vancomycin  IVPB 1000 milliGRAM(s) IV Intermittent every 12 hours    MEDICATIONS  (PRN):      Allergies    No Known Allergies    Intolerances        REVIEW OF SYSTEMS:  Unable to obtain, patient is intubated, sedated   Vital Signs Last 24 Hrs  T(C): 37.5 (17 Aug 2018 08:05), Max: 37.5 (17 Aug 2018 00:28)  T(F): 99.5 (17 Aug 2018 08:05), Max: 99.5 (17 Aug 2018 00:28)  HR: 66 (17 Aug 2018 10:00) (61 - 81)  BP: 97/56 (17 Aug 2018 10:00) (95/53 - 120/60)  BP(mean): 72 (17 Aug 2018 10:00) (69 - 83)  RR: 17 (17 Aug 2018 10:00) (14 - 25)  SpO2: 97% (17 Aug 2018 10:00) (96% - 100%)    PHYSICAL EXAM:  GENERAL: Intubated, sedated   HEAD:  Atraumatic, Normocephalic  EYES:  PERRLA, conjunctiva and sclera clear  ENMT: ETT in place,  Moist mucous membranes  NECK: Supple, No JVD, Normal thyroid  NERVOUS SYSTEM:  Intubated, sedated   CHEST/LUNG:   HEART: Regular rate and rhythm; No murmurs, rubs, or gallops  ABDOMEN: Soft, Nontender, Nondistended; Bowel sounds present  EXTREMITIES:  2+ Peripheral Pulses, No clubbing, cyanosis, or edema  LYMPH: No lymphadenopathy noted  SKIN: No rashes or lesions    LABS:                        14.3   11.82 )-----------( 293      ( 17 Aug 2018 09:11 )             39.7     17 Aug 2018 09:11    142    |  111    |  4      ----------------------------<  103    3.3     |  22     |  0.63     Ca    7.7        17 Aug 2018 09:11  Phos  0.8       17 Aug 2018 09:11  Mg     2.6       17 Aug 2018 09:11    TPro  6.7    /  Alb  3.1    /  TBili  1.5    /  DBili  x      /  AST  97     /  ALT  54     /  AlkPhos  72     17 Aug 2018 09:11    PT/INR - ( 17 Aug 2018 09:12 )   PT: 12.0 sec;   INR: 1.10 ratio         PTT - ( 17 Aug 2018 09:12 )  PTT:28.6 sec  CAPILLARY BLOOD GLUCOSE      POCT Blood Glucose.: 118 mg/dL (16 Aug 2018 23:10)    BLOOD CULTURE    RADIOLOGY & ADDITIONAL TESTS:    Imaging Personally Reviewed:  [ ] YES     Consultant(s) Notes Reviewed:      Care Discussed with Consultants/Other Providers: Patient is a 47y old  Male who presents with a chief complaint of change in mental status (17 Aug 2018 03:42)       INTERVAL HPI/ OVERNIGHT EVENTS: Intubated, sedated.    MEDICATIONS  (STANDING):  acyclovir IVPB 850 milliGRAM(s) IV Intermittent every 8 hours  cefTRIAXone   IVPB 2 Gram(s) IV Intermittent every 12 hours  chlorhexidine 0.12% Liquid 15 milliLiter(s) Swish and Spit two times a day  dexamethasone  IVPB 10 milliGRAM(s) IV Intermittent every 6 hours  pantoprazole  Injectable 40 milliGRAM(s) IV Push daily  potassium chloride   Powder 40 milliEquivalent(s) Oral once  propofol Infusion 40 MICROgram(s)/kG/Min (18.24 mL/Hr) IV Continuous <Continuous>  sodium chloride 0.9%. 1000 milliLiter(s) (100 mL/Hr) IV Continuous <Continuous>  vancomycin  IVPB 1000 milliGRAM(s) IV Intermittent every 12 hours    MEDICATIONS  (PRN):      Allergies    No Known Allergies    Intolerances        REVIEW OF SYSTEMS:  Unable to obtain, patient is intubated, sedated   Vital Signs Last 24 Hrs  T(C): 37.5 (17 Aug 2018 08:05), Max: 37.5 (17 Aug 2018 00:28)  T(F): 99.5 (17 Aug 2018 08:05), Max: 99.5 (17 Aug 2018 00:28)  HR: 66 (17 Aug 2018 10:00) (61 - 81)  BP: 97/56 (17 Aug 2018 10:00) (95/53 - 120/60)  BP(mean): 72 (17 Aug 2018 10:00) (69 - 83)  RR: 17 (17 Aug 2018 10:00) (14 - 25)  SpO2: 97% (17 Aug 2018 10:00) (96% - 100%)    PHYSICAL EXAM:  GENERAL: Intubated, sedated   HEAD:  Atraumatic, Normocephalic  EYES:  PERRLA, conjunctiva and sclera clear  ENMT: ETT in place,  Moist mucous membranes  NECK: Supple, No JVD, Normal thyroid  NERVOUS SYSTEM:  Intubated, sedated   CHEST/LUNG: + bilat corase breath sounds, decrease breath sounds bilat.   HEART: S1S2+,  Regular rate and rhythm  ABDOMEN: Soft, Nontender, Nondistended; Bowel sounds present  EXTREMITIES:  2+ Peripheral Pulses, No clubbing, cyanosis, or edema  LYMPH: No lymphadenopathy noted    LABS:                        14.3   11.82 )-----------( 293      ( 17 Aug 2018 09:11 )             39.7     17 Aug 2018 09:11    142    |  111    |  4      ----------------------------<  103    3.3     |  22     |  0.63     Ca    7.7        17 Aug 2018 09:11  Phos  0.8       17 Aug 2018 09:11  Mg     2.6       17 Aug 2018 09:11    TPro  6.7    /  Alb  3.1    /  TBili  1.5    /  DBili  x      /  AST  97     /  ALT  54     /  AlkPhos  72     17 Aug 2018 09:11    PT/INR - ( 17 Aug 2018 09:12 )   PT: 12.0 sec;   INR: 1.10 ratio         PTT - ( 17 Aug 2018 09:12 )  PTT:28.6 sec  CAPILLARY BLOOD GLUCOSE      POCT Blood Glucose.: 118 mg/dL (16 Aug 2018 23:10)    BLOOD CULTURE    RADIOLOGY & ADDITIONAL TESTS:    Imaging Personally Reviewed:  [ ] YES     Consultant(s) Notes Reviewed:      Care Discussed with Consultants/Other Providers:

## 2018-08-17 NOTE — PROGRESS NOTE ADULT - PROBLEM SELECTOR PLAN 3
Unclear etiology, suspect dehydration. Resolved   Monitor Unclear origin. Possibly from drug overdose vs unknown trauma  -IVFs  -Continue to trend creatine kinase  -Plan as per ICU

## 2018-08-17 NOTE — ED PROCEDURE NOTE - CPROC ED TRACHE INTUB DETAIL1
Patient connected to ventilator with settings as ordered./During intubation, applied gentle pressure to the cricoid cartilage./Patient was pre-oxygenated. An endotracheal tube (ETT) was placed through the vocal cords into the trachea.  ETT position was confirmed by auscultation of bilateral breath sounds to all lung fields. ETCO2 level was appropriate.

## 2018-08-17 NOTE — PROGRESS NOTE ADULT - SUBJECTIVE AND OBJECTIVE BOX
incomplete note    Interval events:     Review of Systems:  Constitutional: no fever, chills, fatigue  Neuro: no headache, numbness, weakness  Resp: no cough, wheezing, shortness of breath  CVS: no chest pain, palpitations, leg swelling  GI: no abdominal pain, nausea, vomiting, diarrhea   : no dysuria, frequency, incontinence  Skin: no itching, burning, rashes, or lesions   Msk: no joint pain or swelling  Psych: no depression, anxiety    T(F): 99.5 (18 @ 08:05), Max: 99.5 (18 @ 00:28)  HR: 66 (18 @ 08:00) (61 - 81)  BP: 100/57 (18 @ 08:00) (100/57 - 120/60)  RR: 19 (18 @ 08:00) (14 - 25)  SpO2: 100% (18 @ 08:00) (96% - 100%)  Wt(kg): --    Mode: AC/ CMV (Assist Control/ Continuous Mandatory Ventilation), RR (machine): 14, TV (machine): 450, FiO2: 40, PEEP: 5    CAPILLARY BLOOD GLUCOSE      POCT Blood Glucose.: 118 mg/dL (16 Aug 2018 23:10)    I&O's Summary    16 Aug 2018 07:  -  17 Aug 2018 07:00  --------------------------------------------------------  IN: 2870 mL / OUT: 3200 mL / NET: -330 mL    17 Aug 2018 07:  -  17 Aug 2018 08:47  --------------------------------------------------------  IN: 124 mL / OUT: 160 mL / NET: -36 mL        Physical Exam:     Gen:  Neuro:  HEENT:  CV:  Pulm:  GI:  Ext:  Skin:    Meds:    acyclovir IVPB 850 milliGRAM(s) IV Intermittent every 8 hours  cefTRIAXone   IVPB 2 Gram(s) IV Intermittent every 12 hours  vancomycin  IVPB 1000 milliGRAM(s) IV Intermittent every 12 hours      dexamethasone  IVPB 10 milliGRAM(s) IV Intermittent every 6 hours      propofol Infusion 40 MICROgram(s)/kG/Min IV Continuous <Continuous>    pantoprazole  Injectable 40 milliGRAM(s) IV Push daily        sodium chloride 0.9%. 1000 milliLiter(s) IV Continuous <Continuous>      chlorhexidine 0.12% Liquid 15 milliLiter(s) Swish and Spit two times a day                                  15.0   14.54 )-----------( 285      ( 16 Aug 2018 23:13 )             41.6       08-16    129<L>  |  95<L>  |  6<L>  ----------------------------<  114<H>  3.5   |  19<L>  |  0.78    Ca    8.4<L>      16 Aug 2018 23:13      Lactate 1.3           08-17 @ 00:21      CARDIAC MARKERS ( 17 Aug 2018 00:21 )  x     / x     / 5150 U/L / x     / x            Urinalysis Basic - ( 17 Aug 2018 06:25 )    Color: Pale Yellow / Appearance: Clear / S.005 / pH: x  Gluc: x / Ketone: Moderate  / Bili: Negative / Urobili: Negative   Blood: x / Protein: Negative / Nitrite: Negative   Leuk Esterase: Small / RBC: 11-25 /HPF / WBC 6-10   Sq Epi: x / Non Sq Epi: Few / Bacteria: Few            ABG - ( 17 Aug 2018 03:20 )  pH, Arterial: 7.38  pH, Blood: x     /  pCO2: 33    /  pO2: 179   / HCO3: 20    / Base Excess: -5.2  /  SaO2: 100                 Radiology: ***    Bedside Lung U/S: ***    Bedside Cardiac U/S: ***    CENTRAL LINE: Y/N   DATE INSERTED:   REMOVE: Y/N    THOMASON: Y/N      DATE INSERTED:        REMOVE: Y/N    A-LINE: Y/N     DATE INSERTED:              REMOVE: Y/N    GLOBAL ISSUE/BEST PRACTICE:  Analgesia:  Sedation:  CAM-ICU:  HOB elevation: yes  Stress ulcer prophylaxis:  VTE prophylaxis:  Glycemic control:  Nutrition:    CODE STATUS: *** Interval events: intubated in ED. unable to obtain LP overnight. spoke with families, agree for LP in AM.     Review of Systems:  unable to obtain due to pt ws sedated and intubated     T(F): 99.5 (18 @ 08:05), Max: 99.5 (18 @ 00:28)  HR: 66 (18 @ 08:00) (61 - 81)  BP: 100/57 (18 @ 08:00) (100/57 - 120/60)  RR: 19 (18 @ 08:00) (14 - 25)  SpO2: 100% (18 @ 08:00) (96% - 100%)  Wt(kg): --    Mode: AC/ CMV (Assist Control/ Continuous Mandatory Ventilation), RR (machine): 14, TV (machine): 450, FiO2: 40, PEEP: 5    CAPILLARY BLOOD GLUCOSE      POCT Blood Glucose.: 118 mg/dL (16 Aug 2018 23:10)    I&O's Summary    16 Aug 2018 07:01  -  17 Aug 2018 07:00  --------------------------------------------------------  IN: 2870 mL / OUT: 3200 mL / NET: -330 mL    17 Aug 2018 07:01  -  17 Aug 2018 08:47  --------------------------------------------------------  IN: 124 mL / OUT: 160 mL / NET: -36 mL        Physical Exam:     General: sedated and intubated.   HEENT: NCAT, PERRLA, moist mucous membranes   Neck: Supple, nontender, no mass  Neurology: sedated and intubated.   Respiratory: CTA B/L, No W/R/R  CV: RRR, +S1/S2, no murmurs, rubs or gallops  Abdominal: Soft, NT, ND +BSx4  Extremities: No C/C/E, + peripheral pulses  Skin: warm, dry, normal color    Meds:    acyclovir IVPB 850 milliGRAM(s) IV Intermittent every 8 hours  cefTRIAXone   IVPB 2 Gram(s) IV Intermittent every 12 hours  vancomycin  IVPB 1000 milliGRAM(s) IV Intermittent every 12 hours      dexamethasone  IVPB 10 milliGRAM(s) IV Intermittent every 6 hours      propofol Infusion 40 MICROgram(s)/kG/Min IV Continuous <Continuous>    pantoprazole  Injectable 40 milliGRAM(s) IV Push daily        sodium chloride 0.9%. 1000 milliLiter(s) IV Continuous <Continuous>      chlorhexidine 0.12% Liquid 15 milliLiter(s) Swish and Spit two times a day                                  15.0   14.54 )-----------( 285      ( 16 Aug 2018 23:13 )             41.6       -    129<L>  |  95<L>  |  6<L>  ----------------------------<  114<H>  3.5   |  19<L>  |  0.78    Ca    8.4<L>      16 Aug 2018 23:13      Lactate 1.3            @ 00:21      CARDIAC MARKERS ( 17 Aug 2018 00:21 )  x     / x     / 5150 U/L / x     / x            Urinalysis Basic - ( 17 Aug 2018 06:25 )    Color: Pale Yellow / Appearance: Clear / S.005 / pH: x  Gluc: x / Ketone: Moderate  / Bili: Negative / Urobili: Negative   Blood: x / Protein: Negative / Nitrite: Negative   Leuk Esterase: Small / RBC: 11-25 /HPF / WBC 6-10   Sq Epi: x / Non Sq Epi: Few / Bacteria: Few            ABG - ( 17 Aug 2018 03:20 )  pH, Arterial: 7.38  pH, Blood: x     /  pCO2: 33    /  pO2: 179   / HCO3: 20    / Base Excess: -5.2  /  SaO2: 100           < from: Xray Chest 1 View-PORTABLE IMMEDIATE (18 @ 10:57) >  EXAM:  XR CHEST PORTABLE IMMED 1V                            PROCEDURE DATE:  2018          INTERPRETATION:  CLINICAL STATEMENT: Chest Pain.    TECHNIQUE: AP view of the chest.    COMPARISON: 2018    FINDINGS/  IMPRESSION:  ET tube againnoted. New feeding tube noted tip under the diaphragm   overlying the stomach.    Nonspecific opacity right lung base without significant change.    No right pleural effusion. Left CP angle excluded.    Heart size cannot be accurately assessed in this projection.                  BRENDA WALSH M.D., ATTENDING RADIOLOGIST  This document has been electronically signed. Aug 17 2018 10:58AM        < end of copied text >      < from: CT Head No Cont (18 @ 23:48) >  EXAM:  CT BRAIN                            PROCEDURE DATE:  2018          INTERPRETATION:  CLINICAL INFORMATION: Altered mental status.    COMPARISON: None available.    PROCEDURE:   Noncontrast CT of the Head was performed from the skull base to the   vertex. Coronal and Sagittal reformats were obtained.    FINDINGS:    There is no acute intracranial hemorrhage, mass effect, midline shift,   extra-axial collection, hydrocephalus, or evidence of acute vascular   territorial infarction. Nonspecific asymmetry in ventricular size, left   greater than right, which may be congenital.    Patchy bilateral ethmoid sinus mucosal thickening and opacification.   Clear mastoid air cells. Visualized osseous structures are intact.   Intraorbital contents are unremarkable.    IMPRESSION:     No acute intracranial hemorrhage, mass effect, or evidence of acute   vascular territorial infarction.    If clinical symptoms persist or worsen, more sensitive evaluation with   brain MRI may be obtained, if no contraindications exist.    Dr. Vasquez discussed the above findings with Dr. Barry at 11:55 PM on   2018 with read back.              < end of copied text >      Bedside Lung U/S: ***    Bedside Cardiac U/S: ***    CENTRAL LINE: N   DATE INSERTED:   REMOVE: Y/N    THOMASON: Y     DATE INSERTED:        REMOVE: Y/N    A-LINE: N     DATE INSERTED:              REMOVE: Y/N    GLOBAL ISSUE/BEST PRACTICE:  Analgesia: precedex  Sedation: propofol while intubated  HOB elevation: yes  Stress ulcer prophylaxis: PPI  VTE prophylaxis: SCD  Glycemic control: none  Nutrition: NPO for now    CODE STATUS: full code.

## 2018-08-17 NOTE — CONSULT NOTE ADULT - SUBJECTIVE AND OBJECTIVE BOX
[facility], Division of Infectious Diseases  POLO Townsend A. Lee    VENKATA, JEANE  47y, Male  [id number]    HPI--  Patient non historian, intubated and sedated. History from chart and ICU team  As per H&P:  46 yo M pmh depression presents with altered mental status. History is obtained from patient's older brother, Sammy, via telephone due to patient being obtunded. Patient lives at home with mother and another brother. Patient returned home from work at 4 am 8/16/18. Went straight to bed and did not wake up for a few hours. Patient's mother was concerned and called brother Sammy who assumed patient was tired from working so many hours at [employer]. Patient woke up a few hours later and opened dresser drawer and urinated into and was making statements that did not make sense. Patient then went back to sleep. Once patient woke up again a few hours after that, patient was still altered and patient's family brought him to the ED. In the ED, patient was very combative, not allowing physical exams and remaining in fetal position. Patient was given valium and is currently obtunded and not responding to questioning. Of note, patient brother states that patient does not smoke, no ETOH hx, no illicit drug use. No travel hx, no recent illness. Brother states that patient walks to work and occasionally walks through woody, grass areas. No other hx available at this time.    In the ED, VS temp 98, HR 64, /46, RR 23, SpO2 100 on room air. WBC 14.54, Hg 15.0, Hct 41.6, platelets 285, neutrophil % 84.7, lactate 1.3, procalcitonin <0.05, Na 129, K 3.5, Cl 95, Co2 19, BUN 6, Cr 0.78, glucose 114, calcium 8.4, creatine kinase 5150, Drug screen negative, salicylate <1.7, acetaminophen <2, alcohol <10    Imaging:  CT head no contrast: No acute intracranial hemorrhage, mass effect, or evidence of acute vascular territorial infarction.If clinical symptoms persist or worsen, more sensitive evaluation with brain MRI may be obtained, if no contraindications exist.  EKG: unoffical read: NSR at 70 bpm     In the ED, received valium 5 mg x 2, NaCl 1000mL x 1.    Patient has received decadron and broad spectrum antibiotics.     PMH/PSH--  Depression  No pertinent past medical history  No significant past surgical history      Allergies-- NKDA per chart      Medications--  Antibiotics: acyclovir IVPB 850 milliGRAM(s) IV Intermittent every 8 hours  cefTRIAXone   IVPB 2 Gram(s) IV Intermittent every 12 hours  vancomycin  IVPB 1000 milliGRAM(s) IV Intermittent every 12 hours    Immunologic:   Other: chlorhexidine 0.12% Liquid  dexamethasone  IVPB  pantoprazole  Injectable  propofol Infusion  sodium chloride 0.9%.      Social History--  EtOH: unknown  Tobacco: unknown  Drug Use: reportedly using drugs    Family/Marital History--  Unknown    Travel/Environmental/Occupational History:  As above    Review of Systems:  Review of systems unable secondary to clinical condition.     Physical Exam--  Vital Signs: T(F): 99.5 (08-17-18 @ 08:05), Max: 99.5 (08-17-18 @ 00:28)  HR: 67 (08-17-18 @ 08:49)  BP: 100/57 (08-17-18 @ 08:00)  RR: 19 (08-17-18 @ 08:00)  SpO2: 98% (08-17-18 @ 08:49)  Wt(kg): --  General: Nontoxic-appearing Male in no acute distress. Intubated on ventilator.  HEENT: AT/NC. EOM unable. Anicteric. Conjunctiva injected without discrete lesions.  Oropharynx unable. +fetor oralis.  Neck: Not rigid. No sense of mass.  Nodes: None palpable.  Lungs: Diminished breath sounds bilaterally without rales, wheezing or rhonchi  Heart: Regular rate and rhythm. No Murmur. No rub. No gallop. No palpable thrill.  Abdomen: Bowel sounds present and normoactive. Soft. Nondistended. Nontender. No sense of mass. No organomegaly.  Back: No spinal tenderness. No costovertebral angle tenderness.   Extremities: No cyanosis or clubbing. No edema.   Skin: Warm. Dry. Good turgor. No rash. No vasculitic stigmata. Erythema preprepatellar on R, boggy. Looks traumatic in nature. Similar but much less extensive changes pretpatellar on left. Left forearm with 3-4 serpiginous purple-red nonblanching lesions potentially consistent with phelbitis/injection site-- though there is no evidence of a puncture and there is no palpable phlebitis. there are two smaller appearing lesions in the upper arm, but they are round and small (about 5-7mm). No other rash evident elsewhere.  Psychiatric: Unable        Laboratory & Imaging Data--  CBC                        15.0   14.54 )-----------( 285      ( 16 Aug 2018 23:13 )             41.6       Chemistries  08-16    129<L>  |  95<L>  |  6<L>  ----------------------------<  114<H>  3.5   |  19<L>  |  0.78    Ca    8.4<L>      16 Aug 2018 23:13    Creatine Kinase, Serum: 5150 U/L (08.17.18 @ 00:21)        Toxicology screens thus far negative.     Urinalysis + Microscopic Examination (08.17.18 @ 06:25)    Glucose Qualitative, Urine: Negative    Blood, Urine: Moderate    Urine Appearance: Clear    Bilirubin: Negative    Urobilinogen: Negative    Specific Gravity: 1.005    Protein, Urine: Negative    Color: Pale Yellow    pH Urine: 7.0    Leukocyte Esterase Concentration: Small    Nitrite: Negative    Ketone - Urine: Moderate    Red Blood Cell - Urine: 11-25 /HPF    White Blood Cell - Urine: 6-10    Epithelial Cells: Few    Bacteria: Few    Culture Data  None    < from: CT Head No Cont (08.16.18 @ 23:48) >    IMPRESSION:     No acute intracranial hemorrhage, mass effect, or evidence of acute   vascular territorial infarction.    If clinical symptoms persist or worsen, more sensitive evaluation with   brain MRI may be obtained, if no contraindications exist.    Dr. Vasquez discussed the above findings with Dr. Barry at 11:55 PM on   8/17/2018 with read back.    < end of copied text >

## 2018-08-17 NOTE — PROVIDER CONTACT NOTE (EICU) - RECOMMENDATIONS
Rx for bacterial meningitis - Vanco 1 G Q12H , ceftriaxone 2Q Q12H, and herpes encephalitis with acyclovir. Curiously, procalcitonin is negative.   Still, will need LP in AM possibly by IR. Decadron to prevent neuro sequelae in setting of possible CNS Infection. no evidence of drug or alcohol use, utox neg  D/W GARRETT Benson at length  CCM time 45 min

## 2018-08-17 NOTE — H&P ADULT - NSHPSOCIALHISTORY_GEN_ALL_CORE
Lives with mother and brother  Ambulates and ADLs   Occupation: works at [employer]  ETOH hx: none  Illicit drug hx: none  Smoking hx: none

## 2018-08-18 DIAGNOSIS — J96.01 ACUTE RESPIRATORY FAILURE WITH HYPOXIA: ICD-10-CM

## 2018-08-18 DIAGNOSIS — E80.6 OTHER DISORDERS OF BILIRUBIN METABOLISM: ICD-10-CM

## 2018-08-18 DIAGNOSIS — F33.9 MAJOR DEPRESSIVE DISORDER, RECURRENT, UNSPECIFIED: ICD-10-CM

## 2018-08-18 DIAGNOSIS — Z29.9 ENCOUNTER FOR PROPHYLACTIC MEASURES, UNSPECIFIED: ICD-10-CM

## 2018-08-18 DIAGNOSIS — M62.82 RHABDOMYOLYSIS: ICD-10-CM

## 2018-08-18 DIAGNOSIS — G93.40 ENCEPHALOPATHY, UNSPECIFIED: ICD-10-CM

## 2018-08-18 LAB
ALBUMIN SERPL ELPH-MCNC: 2.7 G/DL — LOW (ref 3.3–5)
ALP SERPL-CCNC: 62 U/L — SIGNIFICANT CHANGE UP (ref 40–120)
ALT FLD-CCNC: 47 U/L — SIGNIFICANT CHANGE UP (ref 12–78)
AMMONIA BLD-MCNC: 84 UMOL/L — HIGH (ref 11–32)
ANION GAP SERPL CALC-SCNC: 9 MMOL/L — SIGNIFICANT CHANGE UP (ref 5–17)
AST SERPL-CCNC: 57 U/L — HIGH (ref 15–37)
BASOPHILS # BLD AUTO: 0.01 K/UL — SIGNIFICANT CHANGE UP (ref 0–0.2)
BASOPHILS NFR BLD AUTO: 0.1 % — SIGNIFICANT CHANGE UP (ref 0–2)
BILIRUB SERPL-MCNC: 0.7 MG/DL — SIGNIFICANT CHANGE UP (ref 0.2–1.2)
BUN SERPL-MCNC: 8 MG/DL — SIGNIFICANT CHANGE UP (ref 7–23)
CALCIUM SERPL-MCNC: 7 MG/DL — LOW (ref 8.5–10.1)
CHLORIDE SERPL-SCNC: 110 MMOL/L — HIGH (ref 96–108)
CO2 SERPL-SCNC: 22 MMOL/L — SIGNIFICANT CHANGE UP (ref 22–31)
CREAT SERPL-MCNC: 0.55 MG/DL — SIGNIFICANT CHANGE UP (ref 0.5–1.3)
CSF PCR RESULT: SIGNIFICANT CHANGE UP
CULTURE RESULTS: NO GROWTH — SIGNIFICANT CHANGE UP
EOSINOPHIL # BLD AUTO: 0 K/UL — SIGNIFICANT CHANGE UP (ref 0–0.5)
EOSINOPHIL NFR BLD AUTO: 0 % — SIGNIFICANT CHANGE UP (ref 0–6)
GLUCOSE SERPL-MCNC: 166 MG/DL — HIGH (ref 70–99)
HCT VFR BLD CALC: 37.4 % — LOW (ref 39–50)
HGB BLD-MCNC: 12.8 G/DL — LOW (ref 13–17)
IMM GRANULOCYTES NFR BLD AUTO: 0.4 % — SIGNIFICANT CHANGE UP (ref 0–1.5)
LYMPHOCYTES # BLD AUTO: 0.62 K/UL — LOW (ref 1–3.3)
LYMPHOCYTES # BLD AUTO: 4.6 % — LOW (ref 13–44)
MAGNESIUM SERPL-MCNC: 2.5 MG/DL — SIGNIFICANT CHANGE UP (ref 1.6–2.6)
MCHC RBC-ENTMCNC: 29.9 PG — SIGNIFICANT CHANGE UP (ref 27–34)
MCHC RBC-ENTMCNC: 34.2 GM/DL — SIGNIFICANT CHANGE UP (ref 32–36)
MCV RBC AUTO: 87.4 FL — SIGNIFICANT CHANGE UP (ref 80–100)
MONOCYTES # BLD AUTO: 0.63 K/UL — SIGNIFICANT CHANGE UP (ref 0–0.9)
MONOCYTES NFR BLD AUTO: 4.7 % — SIGNIFICANT CHANGE UP (ref 2–14)
NEUTROPHILS # BLD AUTO: 12.06 K/UL — HIGH (ref 1.8–7.4)
NEUTROPHILS NFR BLD AUTO: 90.2 % — HIGH (ref 43–77)
PHOSPHATE SERPL-MCNC: 2 MG/DL — LOW (ref 2.5–4.5)
PLATELET # BLD AUTO: 286 K/UL — SIGNIFICANT CHANGE UP (ref 150–400)
POTASSIUM SERPL-MCNC: 4 MMOL/L — SIGNIFICANT CHANGE UP (ref 3.5–5.3)
POTASSIUM SERPL-SCNC: 4 MMOL/L — SIGNIFICANT CHANGE UP (ref 3.5–5.3)
PROT SERPL-MCNC: 6.1 G/DL — SIGNIFICANT CHANGE UP (ref 6–8.3)
RBC # BLD: 4.28 M/UL — SIGNIFICANT CHANGE UP (ref 4.2–5.8)
RBC # FLD: 14.3 % — SIGNIFICANT CHANGE UP (ref 10.3–14.5)
SODIUM SERPL-SCNC: 141 MMOL/L — SIGNIFICANT CHANGE UP (ref 135–145)
SPECIMEN SOURCE: SIGNIFICANT CHANGE UP
WBC # BLD: 13.38 K/UL — HIGH (ref 3.8–10.5)
WBC # FLD AUTO: 13.38 K/UL — HIGH (ref 3.8–10.5)

## 2018-08-18 PROCEDURE — 99233 SBSQ HOSP IP/OBS HIGH 50: CPT

## 2018-08-18 PROCEDURE — 93010 ELECTROCARDIOGRAM REPORT: CPT

## 2018-08-18 PROCEDURE — 76705 ECHO EXAM OF ABDOMEN: CPT | Mod: 26

## 2018-08-18 RX ORDER — LACTULOSE 10 G/15ML
20 SOLUTION ORAL THREE TIMES A DAY
Qty: 0 | Refills: 0 | Status: DISCONTINUED | OUTPATIENT
Start: 2018-08-18 | End: 2018-08-19

## 2018-08-18 RX ORDER — POTASSIUM PHOSPHATE, MONOBASIC POTASSIUM PHOSPHATE, DIBASIC 236; 224 MG/ML; MG/ML
15 INJECTION, SOLUTION INTRAVENOUS ONCE
Qty: 0 | Refills: 0 | Status: COMPLETED | OUTPATIENT
Start: 2018-08-18 | End: 2018-08-18

## 2018-08-18 RX ORDER — ACYCLOVIR SODIUM 500 MG
750 VIAL (EA) INTRAVENOUS EVERY 8 HOURS
Qty: 0 | Refills: 0 | Status: DISCONTINUED | OUTPATIENT
Start: 2018-08-18 | End: 2018-08-19

## 2018-08-18 RX ORDER — SODIUM CHLORIDE 9 MG/ML
500 INJECTION, SOLUTION INTRAVENOUS ONCE
Qty: 0 | Refills: 0 | Status: DISCONTINUED | OUTPATIENT
Start: 2018-08-18 | End: 2018-08-18

## 2018-08-18 RX ADMIN — Medication 40 MILLIEQUIVALENT(S): at 03:44

## 2018-08-18 RX ADMIN — LACTULOSE 20 GRAM(S): 10 SOLUTION ORAL at 21:57

## 2018-08-18 RX ADMIN — SODIUM CHLORIDE 100 MILLILITER(S): 9 INJECTION INTRAMUSCULAR; INTRAVENOUS; SUBCUTANEOUS at 02:31

## 2018-08-18 RX ADMIN — POTASSIUM PHOSPHATE, MONOBASIC POTASSIUM PHOSPHATE, DIBASIC 62.5 MILLIMOLE(S): 236; 224 INJECTION, SOLUTION INTRAVENOUS at 08:16

## 2018-08-18 RX ADMIN — CEFTRIAXONE 100 GRAM(S): 500 INJECTION, POWDER, FOR SOLUTION INTRAMUSCULAR; INTRAVENOUS at 03:54

## 2018-08-18 RX ADMIN — HEPARIN SODIUM 5000 UNIT(S): 5000 INJECTION INTRAVENOUS; SUBCUTANEOUS at 14:12

## 2018-08-18 RX ADMIN — HEPARIN SODIUM 5000 UNIT(S): 5000 INJECTION INTRAVENOUS; SUBCUTANEOUS at 05:27

## 2018-08-18 RX ADMIN — Medication 117 MILLIGRAM(S): at 03:54

## 2018-08-18 RX ADMIN — LACTULOSE 20 GRAM(S): 10 SOLUTION ORAL at 14:12

## 2018-08-18 RX ADMIN — Medication 115 MILLIGRAM(S): at 21:57

## 2018-08-18 RX ADMIN — DEXMEDETOMIDINE HYDROCHLORIDE IN 0.9% SODIUM CHLORIDE 13.3 MICROGRAM(S)/KG/HR: 4 INJECTION INTRAVENOUS at 02:29

## 2018-08-18 RX ADMIN — Medication 250 MILLIGRAM(S): at 05:26

## 2018-08-18 RX ADMIN — Medication 115 MILLIGRAM(S): at 14:12

## 2018-08-18 RX ADMIN — HEPARIN SODIUM 5000 UNIT(S): 5000 INJECTION INTRAVENOUS; SUBCUTANEOUS at 21:57

## 2018-08-18 NOTE — PROGRESS NOTE ADULT - SUBJECTIVE AND OBJECTIVE BOX
Interval events: no acute events overnight. Weaned off dexmedetomidine. Afebrile and hemodynamically stable    Review of Systems:  Constitutional: no fever, chills, fatigue  Neuro: no headache, numbness, weakness  Resp: no cough, wheezing, shortness of breath  CVS: no chest pain, palpitations, leg swelling  GI: no abdominal pain, nausea, vomiting, diarrhea   : no dysuria, frequency, incontinence  Skin: no itching, burning, rashes, or lesions   Msk: no joint pain or swelling  Psych: no depression, anxiety    T(F): 97.5 (18 @ 07:05), Max: 99.5 (18 @ 08:05)  HR: 45 (18 @ 07:00) (41 - 79)  BP: 83/50 (18 @ 07:00) (83/50 - 111/59)  RR: 11 (18 @ 07:00) (10 - 22)  SpO2: 94% (18 @ 07:00) (92% - 100%)    POCT Blood Glucose.: 118 mg/dL (16 Aug 2018 23:10)    I&O's Summary    17 Aug 2018 07:01  -  18 Aug 2018 07:00  --------------------------------------------------------  IN: 4383.7 mL / OUT: 3360 mL / NET: 1023.7 mL    Physical Exam:     Gen:  Neuro:  HEENT:  CV:  Pulm:  GI:  Ext:  Skin:    Meds:  heparin  Injectable 5000 Unit(s) SubCutaneous every 8 hours  acyclovir IVPB 850 milliGRAM(s) IV Intermittent every 8 hours  cefTRIAXone   IVPB 2 Gram(s) IV Intermittent every 12 hours  rifaximin 550 milliGRAM(s) Oral two times a day  vancomycin  IVPB 1000 milliGRAM(s) IV Intermittent every 12 hours  lactulose Syrup 20 Gram(s) Oral three times a day  potassium phosphate IVPB 15 milliMole(s) IV Intermittent once  sodium chloride 0.9%. 1000 milliLiter(s) IV Continuous <Continuous>                        12.8   13.38 )-----------( 286      ( 18 Aug 2018 05:32 )             37.4     141  |  110  |  8   ----------------------<  166<H>  4.0   |  22   |  0.55    Ca    7.0<L>      18 Aug 2018 05:32  Phos  2.0     08-18  Mg     2.5     08-18    TPro  6.1  /  Alb  2.7<L>  /  TBili  0.7  /  DBili  x   /  AST  57<H>  /  ALT  47  /  AlkPhos  62  08-18      CARDIAC MARKERS ( 17 Aug 2018 16:18 )  .033 ng/mL / x     / 4020 U/L / x     / 16.8 ng/mL  CARDIAC MARKERS ( 17 Aug 2018 11:08 )  .040 ng/mL / x     / x     / x     / 21.5 ng/mL  CARDIAC MARKERS ( 17 Aug 2018 09:11 )  x     / x     / 5138 U/L / x     / x      CARDIAC MARKERS ( 17 Aug 2018 00:21 )  x     / x     / 5150 U/L / x     / x        PT/INR - ( 17 Aug 2018 09:12 )   PT: 12.0 sec;   INR: 1.10 ratio    PTT - ( 17 Aug 2018 09:12 )  PTT:28.6 sec    Urinalysis Basic - ( 17 Aug 2018 06:25 )    Color: Pale Yellow / Appearance: Clear / S.005 / pH: x  Gluc: x / Ketone: Moderate  / Bili: Negative / Urobili: Negative   Blood: x / Protein: Negative / Nitrite: Negative   Leuk Esterase: Small / RBC: 11-25 /HPF / WBC 6-10   Sq Epi: x / Non Sq Epi: Few / Bacteria: Few      .CSF CSF --   No polymorphonuclear cells seen  No organisms seen  by cytocentrifuge  @ 20:37    .Urine Clean Catch (Midstream)   No growth --  @ 10:12    CSF fluid: protein 37, glucose 64, no WBC, 490 RBC (traumatic tap), CSF gram stain negative, culture pending      Rapid RVP Result: El ( @ 17:09)    ABG - ( 17 Aug 2018 03:20 )  pH, Arterial: 7.38  pH, Blood: x     /  pCO2: 33    /  pO2: 179   / HCO3: 20    / Base Excess: -5.2  /  SaO2: 100       CT Head: No acute intracranial hemorrhage, mass effect, or evidence of acute vascular territorial infarction.     CENTRAL LINE: N    THOMASON: Y      DATE INSERTED:        REMOVE: N    A-LINE: N    GLOBAL ISSUE/BEST PRACTICE:  Analgesia: n/a  Sedation: d/c dexmedetomidine  CAM-ICU: Y  HOB elevation: yes  Stress ulcer prophylaxis: n/a  VTE prophylaxis: HSQ  Glycemic control: yes  Nutrition: start diet    CODE STATUS: full Interval events: no acute events overnight. Weaned off dexmedetomidine. Afebrile and hemodynamically stable    Review of Systems:  Constitutional: no fever, chills, fatigue  Neuro: no headache, numbness, weakness  Resp: no cough, wheezing, shortness of breath  CVS: no chest pain, palpitations, leg swelling  GI: no abdominal pain, nausea, vomiting, diarrhea   : no dysuria, frequency, incontinence  Skin: no itching, burning, rashes, or lesions   Msk: no joint pain or swelling  Psych: no depression, anxiety    T(F): 97.5 (18 @ 07:05), Max: 99.5 (18 @ 08:05)  HR: 45 (18 @ 07:00) (41 - 79)  BP: 83/50 (18 @ 07:00) (83/50 - 111/59)  RR: 11 (18 @ 07:00) (10 - 22)  SpO2: 94% (18 @ 07:00) (92% - 100%)    POCT Blood Glucose.: 118 mg/dL (16 Aug 2018 23:10)    I&O's Summary    17 Aug 2018 07:01  -  18 Aug 2018 07:00  --------------------------------------------------------  IN: 4383.7 mL / OUT: 3360 mL / NET: 1023.7 mL    Physical Exam:     Gen: NAD; AAOx2 (said )   Neuro: CN II-XII grossly intact; moving all extremities   HEENT: NC/AT; EOMI; MMM  CV: normal S1 & S2; bradycardic, regular rhythm   Pulm: clear to auscultation bilaterally   GI: soft; NT/ND  Ext: no edema; pulses intact  Skin: warm, well perfused    Meds:  heparin  Injectable 5000 Unit(s) SubCutaneous every 8 hours  acyclovir IVPB 850 milliGRAM(s) IV Intermittent every 8 hours  cefTRIAXone   IVPB 2 Gram(s) IV Intermittent every 12 hours  rifaximin 550 milliGRAM(s) Oral two times a day  vancomycin  IVPB 1000 milliGRAM(s) IV Intermittent every 12 hours  lactulose Syrup 20 Gram(s) Oral three times a day  potassium phosphate IVPB 15 milliMole(s) IV Intermittent once  sodium chloride 0.9%. 1000 milliLiter(s) IV Continuous <Continuous>                        12.8   13.38 )-----------( 286      ( 18 Aug 2018 05:32 )             37.4     141  |  110  |  8   ----------------------<  166<H>  4.0   |  22   |  0.55    Ca    7.0<L>      18 Aug 2018 05:32  Phos  2.0     18  Mg     2.5     18    TPro  6.1  /  Alb  2.7<L>  /  TBili  0.7  /  DBili  x   /  AST  57<H>  /  ALT  47  /  AlkPhos  62  08-18      CARDIAC MARKERS ( 17 Aug 2018 16:18 )  .033 ng/mL / x     / 4020 U/L / x     / 16.8 ng/mL  CARDIAC MARKERS ( 17 Aug 2018 11:08 )  .040 ng/mL / x     / x     / x     / 21.5 ng/mL  CARDIAC MARKERS ( 17 Aug 2018 09:11 )  x     / x     / 5138 U/L / x     / x      CARDIAC MARKERS ( 17 Aug 2018 00:21 )  x     / x     / 5150 U/L / x     / x        PT/INR - ( 17 Aug 2018 09:12 )   PT: 12.0 sec;   INR: 1.10 ratio    PTT - ( 17 Aug 2018 09:12 )  PTT:28.6 sec    Urinalysis Basic - ( 17 Aug 2018 06:25 )    Color: Pale Yellow / Appearance: Clear / S.005 / pH: x  Gluc: x / Ketone: Moderate  / Bili: Negative / Urobili: Negative   Blood: x / Protein: Negative / Nitrite: Negative   Leuk Esterase: Small / RBC: 11-25 /HPF / WBC 6-10   Sq Epi: x / Non Sq Epi: Few / Bacteria: Few      .CSF CSF --   No polymorphonuclear cells seen  No organisms seen  by cytocentrifuge  @ 20:37    .Urine Clean Catch (Midstream)   No growth --  @ 10:12    CSF fluid: protein 37, glucose 64, no WBC, 490 RBC (traumatic tap), CSF gram stain negative, culture pending      Rapid RVP Result: NotDetec ( @ 17:09)    ABG - ( 17 Aug 2018 03:20 )  pH, Arterial: 7.38  pH, Blood: x     /  pCO2: 33    /  pO2: 179   / HCO3: 20    / Base Excess: -5.2  /  SaO2: 100       CT Head: No acute intracranial hemorrhage, mass effect, or evidence of acute vascular territorial infarction.     CENTRAL LINE: N    THOMASON: Y      DATE INSERTED:        REMOVE: N    A-LINE: N    GLOBAL ISSUE/BEST PRACTICE:  Analgesia: n/a  Sedation: d/c dexmedetomidine  CAM-ICU: N  HOB elevation: yes  Stress ulcer prophylaxis: n/a  VTE prophylaxis: HSQ  Glycemic control: yes  Nutrition: start diet    CODE STATUS: full

## 2018-08-18 NOTE — PROGRESS NOTE ADULT - ASSESSMENT
47M PMH Depression and Anxiety who presents with acute alteration of mental status with agitation and delirium, also found to have urinary retention, AG-metabolic acidosis, ketonuria, leukocytosis, hypophosphatemia, and elevated CK (likely due to agitation). DDx include hydroxyzine overdose vs. synthetic marijuana use vs. alcohol withdrawal. Patient denies any alcohol use except for wine at mass every week. Denies any illicit drug use.    - F/up urine K2, spice synthetic marijuana, urine ecstasy, ethylene glycol, & isopropanol levels  - No evidence of QT prolongation, CE's negative  - CSF analysis negative for bacterial meningitis. If PCR negative, will d/c acyclovir. Elevated RBC's is due to traumatic tap. F/up cytopath, borrelia, [place], & crypt antigen. Doubt limbic encephalitis given marked improvement in mental status. RVP negative. F/up HIV  - HD and respiratory stable, on room air with SpO2>95%  - Stable kidney function, replete low phos  - DVT ppx with HSQ  - No endocrine issues  - Discussed with patient at bedside, full code  - Stable for tele

## 2018-08-18 NOTE — PROGRESS NOTE ADULT - ASSESSMENT
48 yo M pmh depression presents with acute vent dependent respiratory failure, altered mental status unclear etiology r/o meningoencephalitis vs metabolic encephalopathy vs other causes (Utox negative)

## 2018-08-18 NOTE — PROGRESS NOTE ADULT - SUBJECTIVE AND OBJECTIVE BOX
Patient is a 47y old  Male who presents with a chief complaint of change in mental status (17 Aug 2018 03:42)      HPI:  46 yo M pmh depression presents with altered mental status. History is obtained from patient's older brother, Sammy, via telephone due to patient being obtunded. Patient lives at home with mother and another brother. Patient returned home from work at 4 am 18. Went straight to bed and did not wake up for a few hours. Patient's mother was concerned and called brother Sammy who assumed patient was tired from working so many hours at [employer]. Patient woke up a few hours later and opened dresser drawer and urinated into and was making statements that did not make sense. Patient then went back to sleep. Once patient woke up again a few hours after that, patient was still altered and patient's family brought him to the ED. In the ED, patient was very combative, not allowing physical exams and remaining in fetal position. Patient was given valium and is currently obtunded and not responding to questioning. Of note, patient brother states that patient does not smoke, no ETOH hx, no illicit drug use. No travel hx, no recent illness. Brother states that patient walks to work and occasionally walks through woody, grass areas. No other hx available at this time.    In the ED, VS temp 98, HR 64, /46, RR 23, SpO2 100 on room air. WBC 14.54, Hg 15.0, Hct 41.6, platelets 285, neutrophil % 84.7, lactate 1.3, procalcitonin <0.05, Na 129, K 3.5, Cl 95, Co2 19, BUN 6, Cr 0.78, glucose 114, calcium 8.4, creatine kinase 5150, Drug screen negative, salicylate <1.7, acetaminophen <2, alcohol <10    Imaging:  CT head no contrast: No acute intracranial hemorrhage, mass effect, or evidence of acute vascular territorial infarction.If clinical symptoms persist or worsen, more sensitive evaluation with brain MRI may be obtained, if no contraindications exist.  EKG: unoffical read: NSR at 70 bpm     In the ED, received valium 5 mg x 2, NaCl 1000mL x 1. (17 Aug 2018 01:45)      INTERVAL HPI/OVERNIGHT EVENTS: Pt seen and evaluated at the bedside. No acute overnight events occurred. Extubated yesterday, underwent successful LP yesterday. No events overnight. Pt remains altered and repeats himself over and over again while I was in the room with him. Otherwise, hemodynamically stable. Afebrile last 24hrs.       T(C): 36.4 (18 @ 12:17), Max: 36.9 (18 @ 20:30)  HR: 82 (18 @ 14:00) (41 - 82)  BP: 90/53 (18 @ 14:00) (80/47 - 107/57)  RR: 26 (18 @ 14:00) (10 - 26)  SpO2: 92% (18 @ 14:00) (92% - 98%)  Wt(kg): --  I&O's Summary    17 Aug 2018 07:  -  18 Aug 2018 07:00  --------------------------------------------------------  IN: 4383.7 mL / OUT: 3460 mL / NET: 923.7 mL    18 Aug 2018 07:01  -  18 Aug 2018 15:11  --------------------------------------------------------  IN: 1150 mL / OUT: 650 mL / NET: 500 mL        REVIEW OF SYSTEMS:  unable to obtain reliable ROS as the pt is altered and answering questions inappropriately     PHYSICAL EXAM:  GENERAL: patient appears comfortable, no acute distress, nasal cannula in place, nontoxic appearing  EYES: sclera clear, no exudates  ENMT: oropharynx clear without erythema, no exudates, moist mucous membranes  NECK: supple, soft, no thyromegaly noted  LUNGS: good air entry bilaterally, clear to auscultation, symmetric breath sounds, no wheezing or rhonchi appreciated  HEART: soft S1/S2, regular rate and rhythm, no murmurs noted, no lower extremity edema  GASTROINTESTINAL: abdomen is soft, nontender, nondistended, normoactive bowel sounds, no palpable masses  INTEGUMENT: good skin turgor, no lesions noted, no obvious rashes  MUSCULOSKELETAL: no clubbing or cyanosis, no obvious deformity  NEUROLOGIC: awake, alert, good muscle tone in 4 extremities  HEME/LYMPH: no palpable supraclavicular nodules, no obvious ecchymosis or petechiae     MEDICATIONS  (STANDING):  acyclovir IVPB 750 milliGRAM(s) IV Intermittent every 8 hours  heparin  Injectable 5000 Unit(s) SubCutaneous every 8 hours  lactulose Syrup 20 Gram(s) Oral three times a day  rifaximin 550 milliGRAM(s) Oral two times a day    MEDICATIONS  (PRN):      LABS:                        12.8   13.38 )-----------( 286      ( 18 Aug 2018 05:32 )             37.4     08-18    141  |  110<H>  |  8   ----------------------------<  166<H>  4.0   |  22  |  0.55    Ca    7.0<L>      18 Aug 2018 05:32  Phos  2.0       Mg     2.5         TPro  6.1  /  Alb  2.7<L>  /  TBili  0.7  /  DBili  x   /  AST  57<H>  /  ALT  47  /  AlkPhos  62  08-18    PT/INR - ( 17 Aug 2018 09:12 )   PT: 12.0 sec;   INR: 1.10 ratio         PTT - ( 17 Aug 2018 09:12 )  PTT:28.6 sec  Urinalysis Basic - ( 17 Aug 2018 06:25 )    Color: Pale Yellow / Appearance: Clear / S.005 / pH: x  Gluc: x / Ketone: Moderate  / Bili: Negative / Urobili: Negative   Blood: x / Protein: Negative / Nitrite: Negative   Leuk Esterase: Small / RBC: 11-25 /HPF / WBC 6-10   Sq Epi: x / Non Sq Epi: Few / Bacteria: Few      CAPILLARY BLOOD GLUCOSE        ABG - ( 17 Aug 2018 03:20 )  pH, Arterial: 7.38  pH, Blood: x     /  pCO2: 33    /  pO2: 179   / HCO3: 20    / Base Excess: -5.2  /  SaO2: 100                @ 20:37   Testing in progress  --  --   @ 10:12   No growth  --  --   @ 07:45   No growth to date.  --  --            Personally reviewed:  Vital sign trends: [ x ] yes    [  ] no     [  ] n/a - as detailed above  Laboratory results: [ x ] yes    [  ] no     [  ] n/a - as detailed above  Radiology results: [ x ] yes    [  ] no     [  ] n/a - including: abd sono, cxr  Culture results: [ x ] yes    [  ] no     [  ] n/a  - blood/urine neg, unable to perform AFB due to only 5cc volume received, no organisms noted on CSF gram stain and CSF gram stain is pending  Consultant recommendations: [ x ] yes    [  ] no     [  ] n/a

## 2018-08-18 NOTE — PROGRESS NOTE ADULT - ASSESSMENT
Altered mental status  No fevers  WBC elevated, nonspecific in this setting  Elevated CK, some minimal traumatic findings on knees not enough to explain CK  ?Stupor -> rhabdo  Being assessed for substance abuse/overdose by CCM  LP trauymatic tap but negligible pleocytosis  HSV seems unlikley given rapid improvement but CSF profile can be normal in HSV encephalitis.

## 2018-08-18 NOTE — PROGRESS NOTE ADULT - SUBJECTIVE AND OBJECTIVE BOX
[facility], Division of Infectious Diseases  [facility] [address] A. Lee  [phone]    Name: JEANE HASTINGS  Age: 47y  Gender: Male  MRN: [id number]    Interval History--  Notes reviewed. Seen earlier this AM. Extubated, awake/alert. Feels normal.  Wants to be discharged so he can go back to work.    Past Medical History--  Depression  No pertinent past medical history  No significant past surgical history      For details regarding the patient's social history, family history, and other miscellaneous elements, please refer the initial infectious diseases consultation and/or the admitting history and physical examination for this admission.    Allergies    No Known Allergies    Intolerances        Medications--  Antibiotics:  acyclovir IVPB 750 milliGRAM(s) IV Intermittent every 8 hours  rifaximin 550 milliGRAM(s) Oral two times a day    Immunologic:    Other:  heparin  Injectable  lactulose Syrup      Review of Systems--  A 10-point review of systems was obtained.  Review of systems otherwise negative except as previously noted.    Physical Examination--  Vital Signs: T(F): 97.6 (08-18-18 @ 12:17), Max: 98.4 (08-17-18 @ 20:30)  HR: 82 (08-18-18 @ 14:00)  BP: 90/53 (08-18-18 @ 14:00)  RR: 26 (08-18-18 @ 14:00)  SpO2: 92% (08-18-18 @ 14:00)  Wt(kg): --  General: Nontoxic-appearing Male in no acute distress.  HEENT: AT/NC. nicteric. Conjunctiva pink and moist. Oropharynx clear.   Neck: Not rigid. No sense of mass.  Nodes: None palpable.  Lungs: Clear bilaterally without rales, wheezing or rhonchi  Heart: Regular rate and rhythm. No Murmur. No rub. No gallop. No palpable thrill.  Abdomen: Bowel sounds present and normoactive. Soft. Nondistended. Nontender. No sense of mass. No organomegaly.  Back: No spinal tenderness. No costovertebral angle tenderness.   Extremities: No cyanosis or clubbing. No edema.   Skin: Warm. Dry. Good turgor. No rash. No vasculitic stigmata.  Psychiatric: Appropriate affect and mood for situation.         Laboratory Studies--  CBC                        12.8   13.38 )-----------( 286      ( 18 Aug 2018 05:32 )             37.4       Chemistries  08-18    141  |  110<H>  |  8   ----------------------------<  166<H>  4.0   |  22  |  0.55    Ca    7.0<L>      18 Aug 2018 05:32  Phos  2.0     08-18  Mg     2.5     08-18    TPro  6.1  /  Alb  2.7<L>  /  TBili  0.7  /  DBili  x   /  AST  57<H>  /  ALT  47  /  AlkPhos  62  08-18      CSF PCR pending    Culture Data    Culture - Fungal, CSF (collected 17 Aug 2018 20:37)  Source: .CSF CSF  Preliminary Report (18 Aug 2018 11:02):    Testing in progress    Culture - CSF with Gram Stain (collected 17 Aug 2018 20:37)  Source: .CSF CSF  Gram Stain (17 Aug 2018 21:12):    No polymorphonuclear cells seen    No organisms seen    by cytocentrifuge    Culture - Acid Fast - CSF (collected 17 Aug 2018 20:35)  Source: .CSF CSF    Culture - Urine (collected 17 Aug 2018 10:12)  Source: .Urine Clean Catch (Midstream)  Final Report (18 Aug 2018 07:37):    No growth    Culture - Blood (collected 17 Aug 2018 07:45)  Source: .Blood Blood-Venous  Preliminary Report (18 Aug 2018 08:01):    No growth to date.    Culture - Blood (collected 17 Aug 2018 07:45)  Source: .Blood Blood-Venous  Preliminary Report (18 Aug 2018 08:01):    No growth to date.

## 2018-08-19 ENCOUNTER — TRANSCRIPTION ENCOUNTER (OUTPATIENT)
Age: 48
End: 2018-08-19

## 2018-08-19 LAB
ALBUMIN SERPL ELPH-MCNC: 2.9 G/DL — LOW (ref 3.3–5)
ALP SERPL-CCNC: 73 U/L — SIGNIFICANT CHANGE UP (ref 40–120)
ALT FLD-CCNC: 55 U/L — SIGNIFICANT CHANGE UP (ref 12–78)
ANION GAP SERPL CALC-SCNC: 10 MMOL/L — SIGNIFICANT CHANGE UP (ref 5–17)
AST SERPL-CCNC: 52 U/L — HIGH (ref 15–37)
BASOPHILS # BLD AUTO: 0.03 K/UL — SIGNIFICANT CHANGE UP (ref 0–0.2)
BASOPHILS NFR BLD AUTO: 0.2 % — SIGNIFICANT CHANGE UP (ref 0–2)
BILIRUB SERPL-MCNC: 0.7 MG/DL — SIGNIFICANT CHANGE UP (ref 0.2–1.2)
BUN SERPL-MCNC: 11 MG/DL — SIGNIFICANT CHANGE UP (ref 7–23)
CALCIUM SERPL-MCNC: 7.7 MG/DL — LOW (ref 8.5–10.1)
CHLORIDE SERPL-SCNC: 110 MMOL/L — HIGH (ref 96–108)
CO2 SERPL-SCNC: 23 MMOL/L — SIGNIFICANT CHANGE UP (ref 22–31)
CREAT SERPL-MCNC: 0.67 MG/DL — SIGNIFICANT CHANGE UP (ref 0.5–1.3)
CRYPTOC AG CSF-ACNC: NEGATIVE — SIGNIFICANT CHANGE UP
EOSINOPHIL # BLD AUTO: 0.03 K/UL — SIGNIFICANT CHANGE UP (ref 0–0.5)
EOSINOPHIL NFR BLD AUTO: 0.2 % — SIGNIFICANT CHANGE UP (ref 0–6)
ETHYLENE GLYCOL SERPLBLD-MCNC: SIGNIFICANT CHANGE UP MG/DL
GLUCOSE SERPL-MCNC: 80 MG/DL — SIGNIFICANT CHANGE UP (ref 70–99)
HCT VFR BLD CALC: 42.6 % — SIGNIFICANT CHANGE UP (ref 39–50)
HGB BLD-MCNC: 14.7 G/DL — SIGNIFICANT CHANGE UP (ref 13–17)
HIV 1+2 AB+HIV1 P24 AG SERPL QL IA: SIGNIFICANT CHANGE UP
IMM GRANULOCYTES NFR BLD AUTO: 0.3 % — SIGNIFICANT CHANGE UP (ref 0–1.5)
LYMPHOCYTES # BLD AUTO: 2.54 K/UL — SIGNIFICANT CHANGE UP (ref 1–3.3)
LYMPHOCYTES # BLD AUTO: 21.1 % — SIGNIFICANT CHANGE UP (ref 13–44)
MAGNESIUM SERPL-MCNC: 2.3 MG/DL — SIGNIFICANT CHANGE UP (ref 1.6–2.6)
MCHC RBC-ENTMCNC: 30.3 PG — SIGNIFICANT CHANGE UP (ref 27–34)
MCHC RBC-ENTMCNC: 34.5 GM/DL — SIGNIFICANT CHANGE UP (ref 32–36)
MCV RBC AUTO: 87.8 FL — SIGNIFICANT CHANGE UP (ref 80–100)
MONOCYTES # BLD AUTO: 0.87 K/UL — SIGNIFICANT CHANGE UP (ref 0–0.9)
MONOCYTES NFR BLD AUTO: 7.2 % — SIGNIFICANT CHANGE UP (ref 2–14)
NEUTROPHILS # BLD AUTO: 8.51 K/UL — HIGH (ref 1.8–7.4)
NEUTROPHILS NFR BLD AUTO: 71 % — SIGNIFICANT CHANGE UP (ref 43–77)
PHOSPHATE SERPL-MCNC: 1.6 MG/DL — LOW (ref 2.5–4.5)
PLATELET # BLD AUTO: 341 K/UL — SIGNIFICANT CHANGE UP (ref 150–400)
POTASSIUM SERPL-MCNC: 3.5 MMOL/L — SIGNIFICANT CHANGE UP (ref 3.5–5.3)
POTASSIUM SERPL-SCNC: 3.5 MMOL/L — SIGNIFICANT CHANGE UP (ref 3.5–5.3)
PROT SERPL-MCNC: 6.8 G/DL — SIGNIFICANT CHANGE UP (ref 6–8.3)
RBC # BLD: 4.85 M/UL — SIGNIFICANT CHANGE UP (ref 4.2–5.8)
RBC # FLD: 14.9 % — HIGH (ref 10.3–14.5)
SODIUM SERPL-SCNC: 143 MMOL/L — SIGNIFICANT CHANGE UP (ref 135–145)
WBC # BLD: 12.02 K/UL — HIGH (ref 3.8–10.5)
WBC # FLD AUTO: 12.02 K/UL — HIGH (ref 3.8–10.5)

## 2018-08-19 PROCEDURE — 99232 SBSQ HOSP IP/OBS MODERATE 35: CPT

## 2018-08-19 PROCEDURE — 93010 ELECTROCARDIOGRAM REPORT: CPT

## 2018-08-19 PROCEDURE — 99233 SBSQ HOSP IP/OBS HIGH 50: CPT

## 2018-08-19 RX ORDER — LACTULOSE 10 G/15ML
20 SOLUTION ORAL DAILY
Qty: 0 | Refills: 0 | Status: DISCONTINUED | OUTPATIENT
Start: 2018-08-19 | End: 2018-08-20

## 2018-08-19 RX ORDER — SODIUM,POTASSIUM PHOSPHATES 278-250MG
1 POWDER IN PACKET (EA) ORAL ONCE
Qty: 0 | Refills: 0 | Status: COMPLETED | OUTPATIENT
Start: 2018-08-19 | End: 2018-08-19

## 2018-08-19 RX ORDER — POTASSIUM PHOSPHATE, MONOBASIC POTASSIUM PHOSPHATE, DIBASIC 236; 224 MG/ML; MG/ML
30 INJECTION, SOLUTION INTRAVENOUS ONCE
Qty: 0 | Refills: 0 | Status: COMPLETED | OUTPATIENT
Start: 2018-08-19 | End: 2018-08-19

## 2018-08-19 RX ORDER — POTASSIUM CHLORIDE 20 MEQ
20 PACKET (EA) ORAL ONCE
Qty: 0 | Refills: 0 | Status: COMPLETED | OUTPATIENT
Start: 2018-08-19 | End: 2018-08-19

## 2018-08-19 RX ADMIN — LACTULOSE 20 GRAM(S): 10 SOLUTION ORAL at 08:41

## 2018-08-19 RX ADMIN — Medication 115 MILLIGRAM(S): at 06:13

## 2018-08-19 RX ADMIN — HEPARIN SODIUM 5000 UNIT(S): 5000 INJECTION INTRAVENOUS; SUBCUTANEOUS at 14:27

## 2018-08-19 RX ADMIN — HEPARIN SODIUM 5000 UNIT(S): 5000 INJECTION INTRAVENOUS; SUBCUTANEOUS at 21:54

## 2018-08-19 RX ADMIN — Medication 1 PACKET(S): at 10:45

## 2018-08-19 RX ADMIN — HEPARIN SODIUM 5000 UNIT(S): 5000 INJECTION INTRAVENOUS; SUBCUTANEOUS at 06:21

## 2018-08-19 RX ADMIN — Medication 20 MILLIEQUIVALENT(S): at 08:40

## 2018-08-19 RX ADMIN — POTASSIUM PHOSPHATE, MONOBASIC POTASSIUM PHOSPHATE, DIBASIC 85 MILLIMOLE(S): 236; 224 INJECTION, SOLUTION INTRAVENOUS at 10:45

## 2018-08-19 NOTE — DISCHARGE NOTE ADULT - PLAN OF CARE
improve symptoms - Symptoms have greatly improved in the hospital. The reason for elevated ammonia levels is still unknown. It is suggested that you follow up with your primary care provider and a gastroenterologist (the  information for the doctor you saw in the hospital is listed in the instructions below). Please continue to take your medications as prescribed and follow up appropriately. - Reason for symptoms was likely toxin induced but the identity of the toxin remains unknown at this time. Rhabdo resolved with supplemental fluids. Please stay well hydrated. Return if you notice reduced urine output or dramatic changes in the color of your urine. improve liver function - Liver enzymes were elevated here in the hospital however are improving at the time of discharge. Please follow up with gastroenterology to further follow up your liver function. Avoid tylenol as this may be toxic to your liver. Also avoid tylenol.

## 2018-08-19 NOTE — PROGRESS NOTE ADULT - SUBJECTIVE AND OBJECTIVE BOX
Interval events: no acute events overnight, afebrile, feels well    Review of Systems:  Constitutional: no fever, chills, fatigue  Neuro: no headache, numbness, weakness  Resp: no cough, wheezing, shortness of breath  CVS: no chest pain, palpitations, leg swelling  GI: no abdominal pain, nausea, vomiting, diarrhea   : no dysuria, frequency, incontinence  Skin: no itching, burning, rashes, or lesions   Msk: no joint pain or swelling  Psych: no depression, anxiety    T(F): 98.7 (08-19-18 @ 07:42), Max: 98.8 (08-18-18 @ 20:39)  HR: 77 (08-19-18 @ 07:00) (48 - 89)  BP: 121/67 (08-19-18 @ 07:00) (85/50 - 132/67)  RR: 23 (08-19-18 @ 07:00) (14 - 35)  SpO2: 89% (08-19-18 @ 07:00) (89% - 98%)    I&O's Summary    18 Aug 2018 07:01  -  19 Aug 2018 07:00  --------------------------------------------------------  IN: 2100 mL / OUT: 1150 mL / NET: 950 mL    Physical Exam:     Gen: NAD; AAOx2 (said 1998)   Neuro: CN II-XII grossly intact; moving all extremities   HEENT: NC/AT; EOMI; MMM  CV: normal S1 & S2; bradycardic, regular rhythm   Pulm: clear to auscultation bilaterally   GI: soft; NT/ND  Ext: no edema; pulses intact  Skin: warm, well perfused    Meds:  heparin  Injectable 5000 Unit(s) SubCutaneous every 8 hours  rifaximin 550 milliGRAM(s) Oral two times a day  lactulose Syrup 20 Gram(s) Oral three times a day  potassium chloride    Tablet ER 20 milliEquivalent(s) Oral once  potassium phosphate / sodium phosphate powder 1 Packet(s) Oral once  potassium phosphate IVPB 30 milliMole(s) IV Intermittent once                          14.7   12.02 )-----------( 341      ( 19 Aug 2018 06:29 )             42.6     143  |  110  |  11  ----------------------<  80  3.5   |  23  |  0.67    Ca    7.7<L>      19 Aug 2018 06:29  Phos  1.6     08-19  Mg     2.3     08-19    TPro  6.8  /  Alb  2.9<L>  /  TBili  0.7  /  DBili  x   /  AST  52<H>  /  ALT  55  /  AlkPhos  73  08-19      CARDIAC MARKERS ( 18 Aug 2018 05:32 )  x     / x     / 2597 U/L / x     / x      CARDIAC MARKERS ( 17 Aug 2018 16:18 )  .033 ng/mL / x     / 4020 U/L / x     / 16.8 ng/mL  CARDIAC MARKERS ( 17 Aug 2018 11:08 )  .040 ng/mL / x     / x     / x     / 21.5 ng/mL  CARDIAC MARKERS ( 17 Aug 2018 09:11 )  x     / x     / 5138 U/L / x     / x          PT/INR - ( 17 Aug 2018 09:12 )   PT: 12.0 sec;   INR: 1.10 ratio         PTT - ( 17 Aug 2018 09:12 )  PTT:28.6 sec    .CSF CSF   No growth   No polymorphonuclear cells seen  No organisms seen  by cytocentrifuge 08-17 @ 20:37  .CSF CSF -- -- 08-17 @ 20:35  .Urine Clean Catch (Midstream)   No growth -- 08-17 @ 10:12  .Blood Blood-Venous   No growth to date. -- 08-17 @ 07:45      Rapid RVP Result: NotDetec (08-17 @ 17:09)    CT Head: No acute intracranial hemorrhage, mass effect, or evidence of acute vascular territorial infarction.     CENTRAL LINE: N    THOMASON: N    A-LINE: N    GLOBAL ISSUE/BEST PRACTICE:  Analgesia: n/a  Sedation: n/a  CAM-ICU: N  HOB elevation: yes  Stress ulcer prophylaxis: n/a  VTE prophylaxis: HSQ  Glycemic control: yes  Nutrition: regular diet    CODE STATUS: full

## 2018-08-19 NOTE — DISCHARGE NOTE ADULT - CARE PROVIDER_API CALL
Deangelo Solorio (), Gastroenterology; Internal Medicine  [address] [place] [address]  Phone: [phone]  Fax: [phone]    Shannon Fonseca), Internal Medicine  [address] [place] [address]  Phone: [phone]  Fax: [phone]

## 2018-08-19 NOTE — PROGRESS NOTE ADULT - PROBLEM SELECTOR PLAN 8
VTE ppx w/ heparin  GI ppx not indicated currently
VTE ppx w/ heparin as per ICU  GI ppx not indicated currently
IMPROVE VTE Individual Risk Assessment  RISK                                                                Points  [  ] Previous VTE                                                  3  [  ] Thrombophilia                                               2  [  ] Lower limb paralysis                                      2        (unable to hold up >15 seconds)    [  ] Current Cancer                                              2         (within 6 months)  [  ] Immobilization > 24 hrs                                1  [  ] ICU/CCU stay > 24 hours                              1  [  ] Age > 60                                                      1  IMPROVE VTE Score ____0_____  DVT ppx: lovenox 40mg subq

## 2018-08-19 NOTE — PROGRESS NOTE ADULT - SUBJECTIVE AND OBJECTIVE BOX
Patient is a 47y old  Male who presents with a chief complaint of change in mental status (17 Aug 2018 03:42)      FROM ADMISSION H+P:   HPI:  46 yo M pmh depression presents with altered mental status. History is obtained from patient's older brother, Sammy, via telephone due to patient being obtunded. Patient lives at home with mother and another brother. Patient returned home from work at 4 am 8/16/18. Went straight to bed and did not wake up for a few hours. Patient's mother was concerned and called brother Sammy who assumed patient was tired from working so many hours at [employer]. Patient woke up a few hours later and opened dresser drawer and urinated into and was making statements that did not make sense. Patient then went back to sleep. Once patient woke up again a few hours after that, patient was still altered and patient's family brought him to the ED. In the ED, patient was very combative, not allowing physical exams and remaining in fetal position. Patient was given valium and is currently obtunded and not responding to questioning. Of note, patient brother states that patient does not smoke, no ETOH hx, no illicit drug use. No travel hx, no recent illness. Brother states that patient walks to work and occasionally walks through woody, grass areas. No other hx available at this time.    In the ED, VS temp 98, HR 64, /46, RR 23, SpO2 100 on room air. WBC 14.54, Hg 15.0, Hct 41.6, platelets 285, neutrophil % 84.7, lactate 1.3, procalcitonin <0.05, Na 129, K 3.5, Cl 95, Co2 19, BUN 6, Cr 0.78, glucose 114, calcium 8.4, creatine kinase 5150, Drug screen negative, salicylate <1.7, acetaminophen <2, alcohol <10    Imaging:  CT head no contrast: No acute intracranial hemorrhage, mass effect, or evidence of acute vascular territorial infarction.If clinical symptoms persist or worsen, more sensitive evaluation with brain MRI may be obtained, if no contraindications exist.  EKG: unoffical read: NSR at 70 bpm     In the ED, received valium 5 mg x 2, NaCl 1000mL x 1. (17 Aug 2018 01:45)      ----  INTERVAL HPI/OVERNIGHT EVENTS: Pt seen and evaluated at the bedside. No acute overnight events occurred. Feeling well today. Family @ bedside. Mental status is back to baseline as per family. AAOx3. Brother tells me that pt suffered a TBI at age 9yo and has mild cognitive delay since then. Pt offers no complaints today. Eager for d/c home.     ----    MEDICATIONS  (STANDING):  heparin  Injectable 5000 Unit(s) SubCutaneous every 8 hours  lactulose Syrup 20 Gram(s) Oral daily  rifaximin 550 milliGRAM(s) Oral two times a day    MEDICATIONS  (PRN):      ----  REVIEW OF SYSTEMS:  CONSTITUTIONAL: denies fever, chills. feels tired and unsteady on his feet  HEENT: denies blurred vision, sore throat  SKIN: denies new lesions, rash  CARDIOVASCULAR: denies chest pain, chest pressure, palpitations  RESPIRATORY: denies shortness of breath, sputum production  GASTROINTESTINAL: denies nausea, vomiting, diarrhea, abdominal pain  GENITOURINARY: denies dysuria, discharge  NEUROLOGICAL: denies numbness, headache, focal weakness  MUSCULOSKELETAL: denies new joint pain, muscle aches  HEMATOLOGIC: denies gross bleeding, bruising  LYMPHATICS: denies enlarged lymph nodes, extremity swelling  PSYCHIATRIC: denies recent changes in anxiety, depression  ENDOCRINOLOGIC: denies sweating, cold or heat intolerance    ----  PHYSICAL EXAM:  GENERAL: patient appears comfortable, no acute distress, appropriate, pleasant  EYES: sclera clear, no exudates  ENMT: oropharynx clear without erythema, no exudates, moist mucous membranes  NECK: supple, soft, no thyromegaly noted  LUNGS: good air entry bilaterally, clear to auscultation, symmetric breath sounds, no wheezing or rhonchi appreciated  HEART: soft S1/S2, regular rate and rhythm, no murmurs noted, no lower extremity edema  GASTROINTESTINAL: abdomen is soft, nontender, nondistended, normoactive bowel sounds, no palpable masses  INTEGUMENT: good skin turgor, no lesions noted  MUSCULOSKELETAL: no clubbing or cyanosis, no obvious deformity  NEUROLOGIC: awake, alert, oriented x3, good muscle tone in 4 extremities, no obvious sensory deficits  HEME/LYMPH: no palpable supraclavicular nodules, no obvious ecchymosis or petechiae     T(C): 37.1 (08-19-18 @ 07:42), Max: 37.1 (08-18-18 @ 20:39)  HR: 84 (08-19-18 @ 13:00) (61 - 89)  BP: 120/70 (08-19-18 @ 13:00) (93/54 - 132/67)  RR: 17 (08-19-18 @ 13:00) (14 - 35)  SpO2: 95% (08-19-18 @ 13:00) (89% - 95%)  Wt(kg): --    ----  I&O's Summary    18 Aug 2018 07:01  -  19 Aug 2018 07:00  --------------------------------------------------------  IN: 2100 mL / OUT: 1150 mL / NET: 950 mL    19 Aug 2018 07:01  -  19 Aug 2018 17:23  --------------------------------------------------------  IN: 980 mL / OUT: 301 mL / NET: 679 mL        LABS:                        14.7   12.02 )-----------( 341      ( 19 Aug 2018 06:29 )             42.6     08-19    143  |  110<H>  |  11  ----------------------------<  80  3.5   |  23  |  0.67    Ca    7.7<L>      19 Aug 2018 06:29  Phos  1.6     08-19  Mg     2.3     08-19    TPro  6.8  /  Alb  2.9<L>  /  TBili  0.7  /  DBili  x   /  AST  52<H>  /  ALT  55  /  AlkPhos  73  08-19        CAPILLARY BLOOD GLUCOSE          08-17 @ 20:37   No growth  --  --  08-17 @ 10:12   No growth  --  --  08-17 @ 07:45   No growth to date.  --  --            ----  Personally reviewed:  Vital sign trends: [ x ] yes    [  ] no     [  ] n/a  Laboratory results: [ x ] yes    [  ] no     [  ] n/a - ammonia downtrending, wbc improving  Radiology results: [ x ] yes    [  ] no     [  ] n/a - cxr 8/17/18  Culture results: [ x ] yes    [  ] no     [  ] n/a - blood, csf  Consultant recommendations: [ x ] yes    [  ] no     [  ] n/a - ICU/ID  - ID signed off

## 2018-08-19 NOTE — PROGRESS NOTE ADULT - ASSESSMENT
47M PMH TBI as child, Depression and Anxiety who presents with acute alteration of mental status with agitation and delirium, also found to have urinary retention, AG-metabolic acidosis, ketonuria, leukocytosis, hypophosphatemia, and elevated CK (likely due to agitation). DDx include hydroxyzine overdose vs. synthetic marijuana use vs. alcohol withdrawal.    - F/up urine K2, spice synthetic marijuana, urine ecstasy, & isopropanol levels  - Elevated ammonia level of unclear etiology. Continue rifaximin, decrease lactulose to once daily. Improved ammonia level  - ECG stable, no evidence of QT prolongation, CE's negative  - CSF analysis negative for acute bacterial or viral meningitis. F/up remainder of CSF fluid analysis. Doubt limbic encephalitis given marked improvement in mental status. RVP negative. F/up HIV  - HD and respiratory stable, OOB to chair, ambulate with assistance, PT eval  - Stable kidney function, replete low phos  - DVT ppx with HSQ  - No endocrine issues  - Discussed with patient at bedside, full code  - Stable for floors

## 2018-08-19 NOTE — DISCHARGE NOTE ADULT - PATIENT PORTAL LINK FT
You can access the [facility] Patient Portal, offered by [facility], by registering with the following website: [email or web address]

## 2018-08-19 NOTE — DISCHARGE NOTE ADULT - HOSPITAL COURSE
FROM ADMISSION H+P:   HPI:  48 yo M pmh depression presents with altered mental status. History is obtained from patient's older brother, Sammy, via telephone due to patient being obtunded. Patient lives at home with mother and another brother. Patient returned home from work at 4 am 8/16/18. Went straight to bed and did not wake up for a few hours. Patient's mother was concerned and called brother Sammy who assumed patient was tired from working so many hours at [employer]. Patient woke up a few hours later and opened dresser drawer and urinated into and was making statements that did not make sense. Patient then went back to sleep. Once patient woke up again a few hours after that, patient was still altered and patient's family brought him to the ED. In the ED, patient was very combative, not allowing physical exams and remaining in fetal position. Patient was given valium and is currently obtunded and not responding to questioning. Of note, patient brother states that patient does not smoke, no ETOH hx, no illicit drug use. No travel hx, no recent illness. Brother states that patient walks to work and occasionally walks through woody, grass areas. No other hx available at this time.    In the ED, VS temp 98, HR 64, /46, RR 23, SpO2 100 on room air. WBC 14.54, Hg 15.0, Hct 41.6, platelets 285, neutrophil % 84.7, lactate 1.3, procalcitonin <0.05, Na 129, K 3.5, Cl 95, Co2 19, BUN 6, Cr 0.78, glucose 114, calcium 8.4, creatine kinase 5150, Drug screen negative, salicylate <1.7, acetaminophen <2, alcohol <10    Imaging:  CT head no contrast: No acute intracranial hemorrhage, mass effect, or evidence of acute vascular territorial infarction.If clinical symptoms persist or worsen, more sensitive evaluation with brain MRI may be obtained, if no contraindications exist.  EKG: unoffical read: NSR at 70 bpm     In the ED, received valium 5 mg x 2, NaCl 1000mL x 1. (17 Aug 2018 01:45)      ---  HOSPITAL COURSE:   Patient was urgently intubated for airway protection and admitted to the ICU. Managed supportively and ultimately extubated to room air and stabilized from respiratory standpoint. Symptoms were attributable to possible unidentified toxin. Pt has no recall of events leading to hospitalization. Pt was evaluated for CNS infection and started on broad spectrum abx including antivirals. CSF analysis negative for infectious process. Isolation precautions removed and pt was downgraded to a general medical floor. Idiopathic hyperammonemia was noted. GI was consulted.   ---  CONSULTANTS:   ICU ([facility])  GI ([facility])    ---  TIME SPENT:  The total amount of time spent reviewing the hospital notes, laboratory values, imaging findings, assessing/counseling the patient, discussing with consultant physicians, social work, nursing staff took -- minutes    ---  FINAL DISCHARGE DIAGNOSIS LIST:  Please see last daily progress note for final discharge diagnoses    ---  Primary care provider was made aware of plan for discharge:      [  ] NO     [  ] YES FROM ADMISSION H+P:   HPI:  48 yo M pmh depression presents with altered mental status. History is obtained from patient's older brother, Sammy, via telephone due to patient being obtunded. Patient lives at home with mother and another brother. Patient returned home from work at 4 am 8/16/18. Went straight to bed and did not wake up for a few hours. Patient's mother was concerned and called brother Sammy who assumed patient was tired from working so many hours at [employer]. Patient woke up a few hours later and opened dresser drawer and urinated into and was making statements that did not make sense. Patient then went back to sleep. Once patient woke up again a few hours after that, patient was still altered and patient's family brought him to the ED. In the ED, patient was very combative, not allowing physical exams and remaining in fetal position. Patient was given valium and is currently obtunded and not responding to questioning. Of note, patient brother states that patient does not smoke, no ETOH hx, no illicit drug use. No travel hx, no recent illness. Brother states that patient walks to work and occasionally walks through woody, grass areas. No other hx available at this time.    In the ED, VS temp 98, HR 64, /46, RR 23, SpO2 100 on room air. WBC 14.54, Hg 15.0, Hct 41.6, platelets 285, neutrophil % 84.7, lactate 1.3, procalcitonin <0.05, Na 129, K 3.5, Cl 95, Co2 19, BUN 6, Cr 0.78, glucose 114, calcium 8.4, creatine kinase 5150, Drug screen negative, salicylate <1.7, acetaminophen <2, alcohol <10    Imaging:  CT head no contrast: No acute intracranial hemorrhage, mass effect, or evidence of acute vascular territorial infarction.If clinical symptoms persist or worsen, more sensitive evaluation with brain MRI may be obtained, if no contraindications exist.  EKG: unoffical read: NSR at 70 bpm     In the ED, received valium 5 mg x 2, NaCl 1000mL x 1. (17 Aug 2018 01:45)      ---  HOSPITAL COURSE:   Patient was urgently intubated for airway protection and admitted to the ICU. Managed supportively and ultimately extubated to room air and stabilized from respiratory standpoint. Symptoms were attributable to possible unidentified toxin. Pt has no recall of events leading to hospitalization. Pt was evaluated for CNS infection and started on broad spectrum abx including antivirals. CSF analysis negative for infectious process. Isolation precautions removed and pt was downgraded to a general medical floor. Idiopathic hyperammonemia was noted. GI was consulted. Ammonia levels improved. To be discharged home on lactulose and rifaximin. To follow up w/ GI in 1 week. May need to have liver biopsy but pt is hemodynamically stable for discharge home. Mental status has returned to baseline as per family. Follow up acute hepatitis panel as outpatient. GI in agreement w/ this plan. PT evaluated the pt and suggested home PT.    ---  CONSULTANTS:   ICU ([facility])  GI ([facility])    ---  TIME SPENT:  The total amount of time spent reviewing the hospital notes, laboratory values, imaging findings, assessing/counseling the patient, discussing with consultant physicians, social work, nursing staff took 65 minutes    ---  FINAL DISCHARGE DIAGNOSIS LIST:  Please see last daily progress note for final discharge diagnoses    ---  Primary care provider was made aware of plan for discharge:      [  ] NO     [  ] YES     [ x ] N/A - has no PCP but referral made to [facility]    ---  On day of discharge, offers no complaints. Ambulating comfortably. ROS reviewed w/ pt and negative unless otherwise detailed.    PHYSICAL EXAM:  GENERAL: patient appears comfortable, no acute distress, appropriate, pleasant, sitting up in chair at bedside  EYES: sclera clear, no exudates  ENMT: oropharynx clear without erythema, no exudates, moist mucous membranes  NECK: supple, soft, no thyromegaly noted  LUNGS: good air entry bilaterally, clear to auscultation, symmetric breath sounds, no wheezing or rhonchi appreciated  HEART: soft S1/S2, regular rate and rhythm, no murmurs noted, no lower extremity edema  GASTROINTESTINAL: abdomen is soft, nontender, nondistended, normoactive bowel sounds, no palpable masses  INTEGUMENT: good skin turgor, no lesions noted  MUSCULOSKELETAL: no clubbing or cyanosis, no obvious deformity  NEUROLOGIC: awake, alert, oriented x3, good muscle tone in 4 extremities, no obvious sensory deficits, no tremors  HEME/LYMPH: no palpable supraclavicular nodules, no obvious ecchymosis or petechiae

## 2018-08-19 NOTE — PROGRESS NOTE ADULT - ASSESSMENT
Altered mental status  No fevers  WBC elevated, nonspecific in this setting  Elevated CK, some minimal traumatic findings on knees not enough to explain CK  ?Stupor -> rhabdo  Being assessed for substance abuse/overdose by CCM  No evidence of infection elucidated.

## 2018-08-19 NOTE — DISCHARGE NOTE ADULT - MEDICATION SUMMARY - MEDICATIONS TO TAKE
I will START or STAY ON the medications listed below when I get home from the hospital:    lactulose 10 g/15 mL oral syrup  -- 30 milliliter(s) by mouth once a day  -- Indication: For Hyperammonemia    rifAXIMin 550 mg oral tablet  -- 1 tab(s) by mouth 2 times a day  -- Indication: For Hyperammonemia

## 2018-08-19 NOTE — CONSULT NOTE ADULT - SUBJECTIVE AND OBJECTIVE BOX
Chief Complaint:  Patient is a 47y old  Male who presents with a chief complaint of change in mental status 48 yo M pmh depression presents with altered mental status. History is obtained from patient's older brother, Sammy, via telephone due to patient being obtunded. Patient lives at home with mother and another brother. Patient returned home from work at 4 am 8/16/18. Went straight to bed and did not wake up for a few hours. Patient's mother was concerned and called brother Sammy who assumed patient was tired from working so many hours at [employer]. Patient woke up a few hours later and opened dresser drawer and urinated into and was making statements that did not make sense. Patient then went back to sleep. Once patient woke up again a few hours after that, patient was still altered and patient's family brought him to the ED. In the ED, patient was very combative, not allowing physical exams and remaining in fetal position. Patient was given valium and is currently obtunded and not responding to questioning. Of note, patient brother states that patient does not smoke, no ETOH hx, no illicit drug use. No travel hx, no recent illness. Brother states that patient walks to work and occasionally walks through woody, grass areas. No other hx available at this time.    In the ED, VS temp 98, HR 64, /46, RR 23, SpO2 100 on room air. WBC 14.54, Hg 15.0, Hct 41.6, platelets 285, neutrophil % 84.7, lactate 1.3, procalcitonin <0.05, Na 129, K 3.5, Cl 95, Co2 19, BUN 6, Cr 0.78, glucose 114, calcium 8.4, creatine kinase 5150, Drug screen negative, salicylate <1.7, acetaminophen <2, alcohol <10    Allergies:  No Known Allergies      Medications:  heparin  Injectable 5000 Unit(s) SubCutaneous every 8 hours  lactulose Syrup 20 Gram(s) Oral daily  rifaximin 550 milliGRAM(s) Oral two times a day      PMHX/PSHX:  Depression  No pertinent past medical history  No significant past surgical history      Family history:  No pertinent family history in first degree relatives      Social History:  Lives with mother and brother  	Ambulates and ADLs   	Occupation: works at [employer]  	ETOH hx: none  	Illicit drug hx: none  Smoking hx: none  ROS:     General:  No wt loss, fevers, chills, night sweats, fatigue,   Eyes:  Good vision, no reported pain  ENT:  No sore throat, pain, runny nose, dysphagia  CV:  No pain, palpitations, hypo/hypertension  Resp:  No dyspnea, cough, tachypnea, wheezing  GI:  No pain, No nausea, No vomiting, No diarrhea, No constipation, No weight loss, No fever, No pruritis, No rectal bleeding, No tarry stools, No dysphagia,  :  No pain, bleeding, incontinence, nocturia  Muscle:  No pain, weakness  Neuro:  No weakness, tingling, memory problems  Psych:  No fatigue, insomnia, mood problems, depression  Endocrine:  No polyuria, polydipsia, cold/heat intolerance  Heme:  No petechiae, ecchymosis, easy bruisability  Skin:  No rash, tattoos, scars, edema      PHYSICAL EXAM:   Vital Signs:  Vital Signs Last 24 Hrs  T(C): 37.1 (19 Aug 2018 07:42), Max: 37.1 (18 Aug 2018 20:39)  T(F): 98.7 (19 Aug 2018 07:42), Max: 98.8 (18 Aug 2018 20:39)  HR: 84 (19 Aug 2018 13:00) (61 - 84)  BP: 120/70 (19 Aug 2018 13:00) (93/54 - 132/67)  BP(mean): 80 (19 Aug 2018 13:00) (70 - 93)  RR: 17 (19 Aug 2018 13:00) (14 - 28)  SpO2: 95% (19 Aug 2018 13:00) (89% - 95%)  Daily     Daily     GENERAL:  Appears stated age, well-groomed, well-nourished, no distress  HEENT:  NC/AT,  conjunctivae clear and pink, no thyromegaly, nodules, adenopathy, no JVD, sclera -anicteric  CHEST:  Full & symmetric excursion, no increased effort, breath sounds clear  HEART:  Regular rhythm, S1, S2, no murmur/rub/S3/S4, no abdominal bruit, no edema  ABDOMEN:  Soft, non-tender, non-distended, normoactive bowel sounds,  no masses ,no hepato-splenomegaly, no signs of chronic liver disease  EXTEREMITIES:  no cyanosis,clubbing or edema  SKIN:  No rash/erythema/ecchymoses/petechiae/wounds/abscess/warm/dry  NEURO:  Alert, oriented, no asterixis, no tremor, no encephalopathy    LABS:                        14.7   12.02 )-----------( 341      ( 19 Aug 2018 06:29 )             42.6     08-19    143  |  110<H>  |  11  ----------------------------<  80  3.5   |  23  |  0.67    Ca    7.7<L>      19 Aug 2018 06:29  Phos  1.6     08-19  Mg     2.3     08-19    TPro  6.8  /  Alb  2.9<L>  /  TBili  0.7  /  DBili  x   /  AST  52<H>  /  ALT  55  /  AlkPhos  73  08-19    LIVER FUNCTIONS - ( 19 Aug 2018 06:29 )  Alb: 2.9 g/dL / Pro: 6.8 g/dL / ALK PHOS: 73 U/L / ALT: 55 U/L / AST: 52 U/L / GGT: x               Amylase Serum--      Lipase serum--       [id number]      Imaging:

## 2018-08-19 NOTE — PROGRESS NOTE ADULT - ASSESSMENT
46 yo M pmh depression presents with acute vent dependent respiratory failure, altered mental status unclear etiology r/o meningoencephalitis vs metabolic encephalopathy vs other causes (Utox negative)

## 2018-08-19 NOTE — PROGRESS NOTE ADULT - SUBJECTIVE AND OBJECTIVE BOX
[facility], Division of Infectious Diseases  POL[facility] [address] Lee  [phone]    Name: JEANE HASTINGS  Age: 47y  Gender: Male  MRN: [id number]    Interval History--  Notes reviewed. Feeling ok. No fevers. CSF PCR panel negative.     Past Medical History--  Depression  No pertinent past medical history  No significant past surgical history      For details regarding the patient's social history, family history, and other miscellaneous elements, please refer the initial infectious diseases consultation and/or the admitting history and physical examination for this admission.    Allergies    No Known Allergies    Intolerances        Medications--  Antibiotics:  rifaximin 550 milliGRAM(s) Oral two times a day    Immunologic:    Other:  heparin  Injectable  lactulose Syrup      Review of Systems--  A 10-point review of systems was obtained.   Review of systems otherwise unchanged compared to prior visit except as previously noted.    Physical Examination--  Vital Signs: T(F): 98.7 (08-19-18 @ 07:42), Max: 98.8 (08-18-18 @ 20:39)  HR: 78 (08-19-18 @ 12:00)  BP: 105/64 (08-19-18 @ 11:00)  RR: 17 (08-19-18 @ 11:00)  SpO2: 93% (08-19-18 @ 11:00)  Wt(kg): --  General: Nontoxic-appearing Male in no acute distress.  HEENT: AT/NC. nicteric. Conjunctiva pink and moist. Oropharynx clear.   Neck: Not rigid. No sense of mass.  Nodes: None palpable.  Lungs: Clear bilaterally without rales, wheezing or rhonchi  Heart: Regular rate and rhythm. No Murmur. No rub. No gallop. No palpable thrill.  Abdomen: Bowel sounds present and normoactive. Soft. Nondistended. Nontender. No sense of mass. No organomegaly.  Back: No spinal tenderness. No costovertebral angle tenderness.   Extremities: No cyanosis or clubbing. No edema.   Skin: Warm. Dry. Good turgor. No rash. No vasculitic stigmata.  Psychiatric: Appropriate affect and mood for situation.         Laboratory Studies--  CBC                        14.7   12.02 )-----------( 341      ( 19 Aug 2018 06:29 )             42.6       Chemistries  08-19    143  |  110<H>  |  11  ----------------------------<  80  3.5   |  23  |  0.67    Ca    7.7<L>      19 Aug 2018 06:29  Phos  1.6     08-19  Mg     2.3     08-19    TPro  6.8  /  Alb  2.9<L>  /  TBili  0.7  /  DBili  x   /  AST  52<H>  /  ALT  55  /  AlkPhos  73  08-19      Culture Data    Culture - Fungal, CSF (collected 17 Aug 2018 20:37)  Source: .CSF CSF  Preliminary Report (18 Aug 2018 11:02):    Testing in progress    Culture - CSF with Gram Stain (collected 17 Aug 2018 20:37)  Source: .CSF CSF  Gram Stain (17 Aug 2018 21:12):    No polymorphonuclear cells seen    No organisms seen    by cytocentrifuge  Preliminary Report (18 Aug 2018 16:45):    No growth    Culture - Acid Fast - CSF (collected 17 Aug 2018 20:35)  Source: .CSF CSF    Culture - Urine (collected 17 Aug 2018 10:12)  Source: .Urine Clean Catch (Midstream)  Final Report (18 Aug 2018 07:37):    No growth    Culture - Blood (collected 17 Aug 2018 07:45)  Source: .Blood Blood-Venous  Preliminary Report (18 Aug 2018 08:01):    No growth to date.    Culture - Blood (collected 17 Aug 2018 07:45)  Source: .Blood Blood-Venous  Preliminary Report (18 Aug 2018 08:01):    No growth to date.

## 2018-08-19 NOTE — DISCHARGE NOTE ADULT - ADDITIONAL INSTRUCTIONS
Please call to schedule follow up appointments with your primary care provider and the gastroenterologist below (please see gastro in 1 week from the time of discharge as we discussed in the hospital)

## 2018-08-19 NOTE — CONSULT NOTE ADULT - PROBLEM SELECTOR RECOMMENDATION 9
avoid all non essential hepatotoxic drugs	  Patient presented with persistent abnormal LFT  Obtain ABD sonogram  Check viral hepatitis panel  PRICE,ASMA,AMA  Iron studies, celiac serologies  AFP, alpha 1 antitrypsin level, ceruloplasmin    Potential liver biopsy in future may be needed if persistent elevated liver function.    diet and wt control  avoid all hepatotoxic drugs also  avoid alcohol and herbel medication.  follow up liver function test.    consider milk thistle one tablet once a day and vitamin E one tablet a day  If lft's remain elevated may try pentoxyfilline
Being assessed  Monitor

## 2018-08-20 VITALS
SYSTOLIC BLOOD PRESSURE: 105 MMHG | RESPIRATION RATE: 17 BRPM | OXYGEN SATURATION: 96 % | TEMPERATURE: 98 F | DIASTOLIC BLOOD PRESSURE: 72 MMHG | HEART RATE: 84 BPM

## 2018-08-20 LAB
ACETONE, GCMS SCREEN URN: SIGNIFICANT CHANGE UP
ALBUMIN SERPL ELPH-MCNC: 3.2 G/DL — LOW (ref 3.3–5)
ALP SERPL-CCNC: 73 U/L — SIGNIFICANT CHANGE UP (ref 40–120)
ALT FLD-CCNC: 62 U/L — SIGNIFICANT CHANGE UP (ref 12–78)
AMMONIA BLD-MCNC: 42 UMOL/L — HIGH (ref 11–32)
ANION GAP SERPL CALC-SCNC: 9 MMOL/L — SIGNIFICANT CHANGE UP (ref 5–17)
AST SERPL-CCNC: 70 U/L — HIGH (ref 15–37)
B BURGDOR DNA SPEC QL NAA+PROBE: NEGATIVE — SIGNIFICANT CHANGE UP
BASOPHILS # BLD AUTO: 0.02 K/UL — SIGNIFICANT CHANGE UP (ref 0–0.2)
BASOPHILS NFR BLD AUTO: 0.2 % — SIGNIFICANT CHANGE UP (ref 0–2)
BILIRUB SERPL-MCNC: 0.8 MG/DL — SIGNIFICANT CHANGE UP (ref 0.2–1.2)
BUN SERPL-MCNC: 8 MG/DL — SIGNIFICANT CHANGE UP (ref 7–23)
CALCIUM SERPL-MCNC: 8.5 MG/DL — SIGNIFICANT CHANGE UP (ref 8.5–10.1)
CHLORIDE SERPL-SCNC: 105 MMOL/L — SIGNIFICANT CHANGE UP (ref 96–108)
CK SERPL-CCNC: 3833 U/L — HIGH (ref 26–308)
CO2 SERPL-SCNC: 26 MMOL/L — SIGNIFICANT CHANGE UP (ref 22–31)
CREAT SERPL-MCNC: 0.65 MG/DL — SIGNIFICANT CHANGE UP (ref 0.5–1.3)
CULTURE RESULTS: NO GROWTH — SIGNIFICANT CHANGE UP
EOSINOPHIL # BLD AUTO: 0.09 K/UL — SIGNIFICANT CHANGE UP (ref 0–0.5)
EOSINOPHIL NFR BLD AUTO: 1.1 % — SIGNIFICANT CHANGE UP (ref 0–6)
GLUCOSE SERPL-MCNC: 106 MG/DL — HIGH (ref 70–99)
HCT VFR BLD CALC: 45.1 % — SIGNIFICANT CHANGE UP (ref 39–50)
HGB BLD-MCNC: 15.5 G/DL — SIGNIFICANT CHANGE UP (ref 13–17)
IMM GRANULOCYTES NFR BLD AUTO: 0.4 % — SIGNIFICANT CHANGE UP (ref 0–1.5)
ISOPROPANOL GCMS: SIGNIFICANT CHANGE UP MG/DL
LDH SERPL L TO P-CCNC: 13 U/L — SIGNIFICANT CHANGE UP
LYMPHOCYTES # BLD AUTO: 2.04 K/UL — SIGNIFICANT CHANGE UP (ref 1–3.3)
LYMPHOCYTES # BLD AUTO: 25.3 % — SIGNIFICANT CHANGE UP (ref 13–44)
MAGNESIUM SERPL-MCNC: 2.4 MG/DL — SIGNIFICANT CHANGE UP (ref 1.6–2.6)
MCHC RBC-ENTMCNC: 30.2 PG — SIGNIFICANT CHANGE UP (ref 27–34)
MCHC RBC-ENTMCNC: 34.4 GM/DL — SIGNIFICANT CHANGE UP (ref 32–36)
MCV RBC AUTO: 87.7 FL — SIGNIFICANT CHANGE UP (ref 80–100)
MONOCYTES # BLD AUTO: 0.69 K/UL — SIGNIFICANT CHANGE UP (ref 0–0.9)
MONOCYTES NFR BLD AUTO: 8.6 % — SIGNIFICANT CHANGE UP (ref 2–14)
NEUTROPHILS # BLD AUTO: 5.2 K/UL — SIGNIFICANT CHANGE UP (ref 1.8–7.4)
NEUTROPHILS NFR BLD AUTO: 64.4 % — SIGNIFICANT CHANGE UP (ref 43–77)
PHOSPHATE SERPL-MCNC: 2.3 MG/DL — LOW (ref 2.5–4.5)
PLATELET # BLD AUTO: 376 K/UL — SIGNIFICANT CHANGE UP (ref 150–400)
POTASSIUM SERPL-MCNC: 3.5 MMOL/L — SIGNIFICANT CHANGE UP (ref 3.5–5.3)
POTASSIUM SERPL-SCNC: 3.5 MMOL/L — SIGNIFICANT CHANGE UP (ref 3.5–5.3)
PROT SERPL-MCNC: 7.3 G/DL — SIGNIFICANT CHANGE UP (ref 6–8.3)
RBC # BLD: 5.14 M/UL — SIGNIFICANT CHANGE UP (ref 4.2–5.8)
RBC # FLD: 14.8 % — HIGH (ref 10.3–14.5)
SODIUM SERPL-SCNC: 140 MMOL/L — SIGNIFICANT CHANGE UP (ref 135–145)
SPECIMEN SOURCE: SIGNIFICANT CHANGE UP
VDRL CSF-TITR: NEGATIVE — SIGNIFICANT CHANGE UP
WBC # BLD: 8.07 K/UL — SIGNIFICANT CHANGE UP (ref 3.8–10.5)
WBC # FLD AUTO: 8.07 K/UL — SIGNIFICANT CHANGE UP (ref 3.8–10.5)

## 2018-08-20 PROCEDURE — 87581 M.PNEUMON DNA AMP PROBE: CPT

## 2018-08-20 PROCEDURE — 82945 GLUCOSE OTHER FLUID: CPT

## 2018-08-20 PROCEDURE — 51702 INSERT TEMP BLADDER CATH: CPT | Mod: XU

## 2018-08-20 PROCEDURE — 87070 CULTURE OTHR SPECIMN AEROBIC: CPT

## 2018-08-20 PROCEDURE — 84484 ASSAY OF TROPONIN QUANT: CPT

## 2018-08-20 PROCEDURE — 81001 URINALYSIS AUTO W/SCOPE: CPT

## 2018-08-20 PROCEDURE — G0480: CPT

## 2018-08-20 PROCEDURE — 87205 SMEAR GRAM STAIN: CPT

## 2018-08-20 PROCEDURE — 94002 VENT MGMT INPAT INIT DAY: CPT

## 2018-08-20 PROCEDURE — 80053 COMPREHEN METABOLIC PANEL: CPT

## 2018-08-20 PROCEDURE — 87102 FUNGUS ISOLATION CULTURE: CPT

## 2018-08-20 PROCEDURE — 71045 X-RAY EXAM CHEST 1 VIEW: CPT

## 2018-08-20 PROCEDURE — 87483 CNS DNA AMP PROBE TYPE 12-25: CPT

## 2018-08-20 PROCEDURE — 84100 ASSAY OF PHOSPHORUS: CPT

## 2018-08-20 PROCEDURE — 82962 GLUCOSE BLOOD TEST: CPT

## 2018-08-20 PROCEDURE — 87116 MYCOBACTERIA CULTURE: CPT

## 2018-08-20 PROCEDURE — 83930 ASSAY OF BLOOD OSMOLALITY: CPT

## 2018-08-20 PROCEDURE — 80074 ACUTE HEPATITIS PANEL: CPT

## 2018-08-20 PROCEDURE — 84157 ASSAY OF PROTEIN OTHER: CPT

## 2018-08-20 PROCEDURE — 83735 ASSAY OF MAGNESIUM: CPT

## 2018-08-20 PROCEDURE — 82553 CREATINE MB FRACTION: CPT

## 2018-08-20 PROCEDURE — 89051 BODY FLUID CELL COUNT: CPT

## 2018-08-20 PROCEDURE — 87798 DETECT AGENT NOS DNA AMP: CPT

## 2018-08-20 PROCEDURE — 86592 SYPHILIS TEST NON-TREP QUAL: CPT

## 2018-08-20 PROCEDURE — 85730 THROMBOPLASTIN TIME PARTIAL: CPT

## 2018-08-20 PROCEDURE — 70450 CT HEAD/BRAIN W/O DYE: CPT

## 2018-08-20 PROCEDURE — 82550 ASSAY OF CK (CPK): CPT

## 2018-08-20 PROCEDURE — 87086 URINE CULTURE/COLONY COUNT: CPT

## 2018-08-20 PROCEDURE — 80307 DRUG TEST PRSMV CHEM ANLYZR: CPT

## 2018-08-20 PROCEDURE — 87486 CHLMYD PNEUM DNA AMP PROBE: CPT

## 2018-08-20 PROCEDURE — 97162 PT EVAL MOD COMPLEX 30 MIN: CPT

## 2018-08-20 PROCEDURE — 85610 PROTHROMBIN TIME: CPT

## 2018-08-20 PROCEDURE — 94799 UNLISTED PULMONARY SVC/PX: CPT

## 2018-08-20 PROCEDURE — 84145 PROCALCITONIN (PCT): CPT

## 2018-08-20 PROCEDURE — 82803 BLOOD GASES ANY COMBINATION: CPT

## 2018-08-20 PROCEDURE — 82140 ASSAY OF AMMONIA: CPT

## 2018-08-20 PROCEDURE — 99285 EMERGENCY DEPT VISIT HI MDM: CPT | Mod: 25

## 2018-08-20 PROCEDURE — 36600 WITHDRAWAL OF ARTERIAL BLOOD: CPT

## 2018-08-20 PROCEDURE — 76705 ECHO EXAM OF ABDOMEN: CPT

## 2018-08-20 PROCEDURE — 83605 ASSAY OF LACTIC ACID: CPT

## 2018-08-20 PROCEDURE — 87040 BLOOD CULTURE FOR BACTERIA: CPT

## 2018-08-20 PROCEDURE — 87476 LYME DIS DNA AMP PROBE: CPT

## 2018-08-20 PROCEDURE — 86403 PARTICLE AGGLUT ANTBDY SCRN: CPT

## 2018-08-20 PROCEDURE — 87389 HIV-1 AG W/HIV-1&-2 AB AG IA: CPT

## 2018-08-20 PROCEDURE — 82693 ASSAY OF ETHYLENE GLYCOL: CPT

## 2018-08-20 PROCEDURE — 99239 HOSP IP/OBS DSCHRG MGMT >30: CPT

## 2018-08-20 PROCEDURE — 31500 INSERT EMERGENCY AIRWAY: CPT

## 2018-08-20 PROCEDURE — 83615 LACTATE (LD) (LDH) ENZYME: CPT

## 2018-08-20 PROCEDURE — 87633 RESP VIRUS 12-25 TARGETS: CPT

## 2018-08-20 PROCEDURE — 80048 BASIC METABOLIC PNL TOTAL CA: CPT

## 2018-08-20 PROCEDURE — 93005 ELECTROCARDIOGRAM TRACING: CPT

## 2018-08-20 PROCEDURE — 85027 COMPLETE CBC AUTOMATED: CPT

## 2018-08-20 RX ORDER — LACTULOSE 10 G/15ML
30 SOLUTION ORAL
Qty: 300 | Refills: 0
Start: 2018-08-20 | End: 2018-09-03

## 2018-08-20 RX ADMIN — HEPARIN SODIUM 5000 UNIT(S): 5000 INJECTION INTRAVENOUS; SUBCUTANEOUS at 05:27

## 2018-08-20 RX ADMIN — LACTULOSE 20 GRAM(S): 10 SOLUTION ORAL at 11:47

## 2018-08-20 NOTE — PROGRESS NOTE ADULT - ATTENDING COMMENTS
47M PMH Depression and Anxiety who presents with acute alteration of mental status with agitation and delirium, also found to have urinary retention, AG-metabolic acidosis, ketonuria, leukocytosis, hypophosphatemia, and elevated CK (likely due to agitation). DDx include hydroxyzine overdose vs. synthetic marijuana use vs. alcohol withdrawal.    - F/up urine K2, spice synthetic marijuana, urine ecstasy. F/up serum osm, ethylene glycol, isopropanol. Check RVP  - Serial ECG's, CE's negative  - LP performed at bedside, was traumatic so expect elevated RBC's, will send for protein, glucose, cell count, gram stain, cultures, viral PCR, borrelia, west nile, cytopath, crypt antigen. Doubt limbic encephalitis, but if no clinical improvement may need to send off paraneoplastic workup and scrotal ultrasound. For now continue vancomycin, ceftriaxone, and acyclovir until results of LP. Hold dexamethasone. F/up ID. Check RVP and HIV levels  - Extubated to NC post-LP, doing well  - Precedex for agitation  - HD stable  - Stable kidney function, replete low K and low phos  - DVT ppx with HSQ (start after LP)  - No endocrine issues  - Discussed with patient's parents and brother at bedside, full code  CC time spent: 45 min
Advanced care planning was discussed with patient and family.  Advanced care planning forms were reviewed and discussed.  Risks, benefits and alternatives of gastroenterologic procedures were discussed in detail and all questions were answered.    30 minutes spent.
Thank you for the courtesy of this referral.    I'll sign off at this time.     Andrea Quach MD  [phone]
Andrea Quach MD  [phone]
Plan per ICU  Prognosis is guarded
The tx plan was discussed in detail with the patient and family members at the bedside. Their questions and concerns were addressed to the best of my ability. They are in agreement with the plan as detailed above. They demonstrated adequate understanding of the counseling which I have provided.

## 2018-08-20 NOTE — PROGRESS NOTE ADULT - PROVIDER SPECIALTY LIST ADULT
Critical Care
Hospitalist
Infectious Disease
Hospitalist
Gastroenterology
Infectious Disease
Hospitalist

## 2018-08-20 NOTE — PHYSICAL THERAPY INITIAL EVALUATION ADULT - PERTINENT HX OF CURRENT PROBLEM, REHAB EVAL
Pt admitted due to change in MS. Pt admitted due to change in MS, pt obtunded and was admitted to the ICU. PMH includes depression, ro/o meningitis CSF (-)

## 2018-08-20 NOTE — PROGRESS NOTE ADULT - PROBLEM SELECTOR PLAN 1
resolved, ast overall downtrending  US showed mild hepatomegaly; plts/coags wnl  f/u acute hepatitis panel  trend lfts/cpk  avoid all non essential hepatotoxic drugs	  will need close outpatient gi f/u upon dc
Resolved
downgraded to GMF  extubated 8/17/18  differential is broad - await results for further Utox orders which were ordered by MICU - likely toxin at this point - GI origin? consulted GI today  - off antimicrobials  - workup still pending: west nile pcr, borrelia pcr, cannabinoids in urine, cryptococcal ag csf, ecstasy in urine, isopropranolol, k2/spice, ldh in csf, vdrl, bath salts  mental status back to baseline  ammonia level is elevated, etiology is unknown - abd u/s suggestive of fatty liver  on lactulose qd and started on xifaxin bid  requested GI consult
Resolved
-Drug tox screen negative   -Consider lumbar puncture   -Continue ceftriaxone, acyclovir, vancomycin   -Consider lyme panel, west nile, RPR  -Consider thyroid panel, hepatitis panel   -F/u blood cultures, urine cultures   -Plan as per ICU  -ID consult
admitted in ICU  extubated yesterday  differential is broad - await results for further Utox orders which were ordered by MICU yesterday - likely toxin vs. infectious  - workup still pending: west nile pcr, borrelia pcr, cannabinoids in urine, cryptococcal ag csf, csf pcr, ecstasy in urine, ethylene glycol, isopropranolol, k2/spice, ldh in csf, vdrl, unlabelled misc test name (bath salts)  still altered but appears more stable when compared to documentation from previous examinations  offers no focal complaints today and is concerned only w/ returning to work ASAP  cultures are still pending  pt remains on antivirals (dose adjusted today for weight) and ID is following  ammonia level is elevated, etiology is unknown - abd u/s suggestive of fatty liver  on lactulose tid and started on xifaxin bid this morning  borderline hypotensive and pt has had episodes of bradycardia, w/ no acute findings on (personally reviewed) ekg  care as per MICU  kaelyn d/c'd today

## 2018-08-20 NOTE — PROGRESS NOTE ADULT - SUBJECTIVE AND OBJECTIVE BOX
INTERVAL HPI/OVERNIGHT EVENTS:  pt seen and examined  denies n/v/abd pain, having +bms on lactulose  labs noted afebrile overnight    MEDICATIONS  (STANDING):  heparin  Injectable 5000 Unit(s) SubCutaneous every 8 hours  lactulose Syrup 20 Gram(s) Oral daily  rifaximin 550 milliGRAM(s) Oral two times a day    MEDICATIONS  (PRN):      Allergies    No Known Allergies    Intolerances        Review of Systems:    General:  No wt loss, fevers, chills, night sweats, fatigue   Eyes:  Good vision, no reported pain  ENT:  No sore throat, pain, runny nose, dysphagia  CV:  No pain, palpitations, hypo/hypertension  Resp:  No dyspnea, cough, tachypnea, wheezing  GI:  No pain, No nausea, No vomiting, No diarrhea, No constipation, No weight loss, No fever, No pruritis, No rectal bleeding, No melena, No dysphagia  :  No pain, bleeding, incontinence, nocturia  Muscle:  No pain, weakness  Neuro:  No weakness, tingling, memory problems  Psych:  No fatigue, insomnia, mood problems, depression  Endocrine:  No polyuria, polydypsia, cold/heat intolerance  Heme:  No petechiae, ecchymosis, easy bruisability  Skin:  No rash, tattoos, scars, edema      Vital Signs Last 24 Hrs  T(C): 36.3 (20 Aug 2018 04:59), Max: 36.8 (19 Aug 2018 20:24)  T(F): 97.4 (20 Aug 2018 04:59), Max: 98.3 (19 Aug 2018 20:24)  HR: 66 (20 Aug 2018 04:59) (66 - 84)  BP: 123/70 (20 Aug 2018 04:59) (105/64 - 123/70)  BP(mean): 80 (19 Aug 2018 13:00) (79 - 86)  RR: 16 (20 Aug 2018 04:59) (16 - 24)  SpO2: 94% (20 Aug 2018 04:59) (93% - 95%)    PHYSICAL EXAM:    Constitutional: NAD, lying in bed  HEENT: EOMI, perrl  Neck: No LAD  Respiratory: dec bs  Cardiovascular: S1 and S2, RRR  Gastrointestinal: BS+, soft, NT, mild DT  Extremities: No peripheral edema  Vascular: 2+ peripheral pulses  Neurological: Awake alert responding appropriately   Skin: No rashes      LABS:                        15.5   8.07  )-----------( 376      ( 20 Aug 2018 07:20 )             45.1     08-20    140  |  105  |  8   ----------------------------<  106<H>  3.5   |  26  |  0.65    Ca    8.5      20 Aug 2018 07:20  Phos  2.3     08-20  Mg     2.4     08-20    TPro  7.3  /  Alb  3.2<L>  /  TBili  0.8  /  DBili  x   /  AST  70<H>  /  ALT  62  /  AlkPhos  73  08-20          RADIOLOGY & ADDITIONAL TESTS:

## 2018-08-20 NOTE — PROGRESS NOTE ADULT - PROBLEM SELECTOR PROBLEM 1
Hyperbilirubinemia
Acute encephalopathy
Altered mental status, unspecified
Acute encephalopathy

## 2018-08-20 NOTE — PROGRESS NOTE ADULT - PROBLEM SELECTOR PLAN 2
ammonia level overall improved, trend  cont lactulose/rifaximin for now  need for further inpatient gi w/u pending clinical course

## 2018-08-21 LAB
Lab: NEGATIVE — SIGNIFICANT CHANGE UP

## 2018-08-22 LAB
CULTURE RESULTS: SIGNIFICANT CHANGE UP
CULTURE RESULTS: SIGNIFICANT CHANGE UP
SPECIMEN SOURCE: SIGNIFICANT CHANGE UP
SPECIMEN SOURCE: SIGNIFICANT CHANGE UP

## 2018-08-23 LAB — K2, SPICE RESULT: NEGATIVE — SIGNIFICANT CHANGE UP

## 2018-09-13 LAB — CANNABINOIDS UR QL SCN: NEGATIVE — SIGNIFICANT CHANGE UP

## 2018-09-15 LAB
CULTURE RESULTS: SIGNIFICANT CHANGE UP
SPECIMEN SOURCE: SIGNIFICANT CHANGE UP

## 2019-01-24 ENCOUNTER — RESULT REVIEW (OUTPATIENT)
Age: 49
End: 2019-01-24

## 2021-01-06 NOTE — CONSULT NOTE ADULT - PROBLEM/RECOMMENDATION-1
BMP result in  
Called [facility]  [phone]  Spoke with Tonya CARTER from client services.  Informed patient needs a BMP per Dr. Alberto's order  Tonya confirmed can view order, has appropriate tube to run a BMP/  Should have BMP results in by tomorrow.  
DISPLAY PLAN FREE TEXT
DISPLAY PLAN FREE TEXT

## 2021-06-14 ENCOUNTER — INPATIENT (INPATIENT)
Facility: HOSPITAL | Age: 51
LOS: 6 days | Discharge: HOME | DRG: 372 | End: 2021-06-21
Attending: SURGERY | Admitting: SURGERY
Payer: MEDICAID

## 2021-06-14 VITALS
RESPIRATION RATE: 16 BRPM | HEIGHT: 64 IN | TEMPERATURE: 98 F | DIASTOLIC BLOOD PRESSURE: 60 MMHG | WEIGHT: 216.05 LBS | OXYGEN SATURATION: 100 % | HEART RATE: 63 BPM | SYSTOLIC BLOOD PRESSURE: 70 MMHG

## 2021-06-14 LAB
ALBUMIN SERPL ELPH-MCNC: 2.5 G/DL — LOW (ref 3.3–5)
ALP SERPL-CCNC: 195 U/L — HIGH (ref 40–120)
ALT FLD-CCNC: 50 U/L — SIGNIFICANT CHANGE UP (ref 12–78)
ANION GAP SERPL CALC-SCNC: 12 MMOL/L — SIGNIFICANT CHANGE UP (ref 5–17)
APPEARANCE UR: CLEAR — SIGNIFICANT CHANGE UP
AST SERPL-CCNC: 40 U/L — HIGH (ref 15–37)
BASOPHILS # BLD AUTO: 0.04 K/UL — SIGNIFICANT CHANGE UP (ref 0–0.2)
BASOPHILS NFR BLD AUTO: 0.3 % — SIGNIFICANT CHANGE UP (ref 0–2)
BILIRUB SERPL-MCNC: 0.8 MG/DL — SIGNIFICANT CHANGE UP (ref 0.2–1.2)
BILIRUB UR-MCNC: NEGATIVE — SIGNIFICANT CHANGE UP
BUN SERPL-MCNC: 11 MG/DL — SIGNIFICANT CHANGE UP (ref 7–23)
CALCIUM SERPL-MCNC: 8.6 MG/DL — SIGNIFICANT CHANGE UP (ref 8.5–10.1)
CHLORIDE SERPL-SCNC: 87 MMOL/L — LOW (ref 96–108)
CO2 SERPL-SCNC: 31 MMOL/L — SIGNIFICANT CHANGE UP (ref 22–31)
COLOR SPEC: YELLOW — SIGNIFICANT CHANGE UP
CREAT SERPL-MCNC: 0.72 MG/DL — SIGNIFICANT CHANGE UP (ref 0.5–1.3)
DIFF PNL FLD: NEGATIVE — SIGNIFICANT CHANGE UP
EOSINOPHIL # BLD AUTO: 0.05 K/UL — SIGNIFICANT CHANGE UP (ref 0–0.5)
EOSINOPHIL NFR BLD AUTO: 0.4 % — SIGNIFICANT CHANGE UP (ref 0–6)
GLUCOSE SERPL-MCNC: 119 MG/DL — HIGH (ref 70–99)
GLUCOSE UR QL: NEGATIVE — SIGNIFICANT CHANGE UP
HCT VFR BLD CALC: 40.7 % — SIGNIFICANT CHANGE UP (ref 39–50)
HGB BLD-MCNC: 14.2 G/DL — SIGNIFICANT CHANGE UP (ref 13–17)
IMM GRANULOCYTES NFR BLD AUTO: 2 % — HIGH (ref 0–1.5)
KETONES UR-MCNC: NEGATIVE — SIGNIFICANT CHANGE UP
LEUKOCYTE ESTERASE UR-ACNC: NEGATIVE — SIGNIFICANT CHANGE UP
LIDOCAIN IGE QN: 62 U/L — LOW (ref 73–393)
LYMPHOCYTES # BLD AUTO: 1.88 K/UL — SIGNIFICANT CHANGE UP (ref 1–3.3)
LYMPHOCYTES # BLD AUTO: 14.2 % — SIGNIFICANT CHANGE UP (ref 13–44)
MCHC RBC-ENTMCNC: 28.6 PG — SIGNIFICANT CHANGE UP (ref 27–34)
MCHC RBC-ENTMCNC: 34.9 GM/DL — SIGNIFICANT CHANGE UP (ref 32–36)
MCV RBC AUTO: 81.9 FL — SIGNIFICANT CHANGE UP (ref 80–100)
MONOCYTES # BLD AUTO: 1.14 K/UL — HIGH (ref 0–0.9)
MONOCYTES NFR BLD AUTO: 8.6 % — SIGNIFICANT CHANGE UP (ref 2–14)
NEUTROPHILS # BLD AUTO: 9.9 K/UL — HIGH (ref 1.8–7.4)
NEUTROPHILS NFR BLD AUTO: 74.5 % — SIGNIFICANT CHANGE UP (ref 43–77)
NITRITE UR-MCNC: NEGATIVE — SIGNIFICANT CHANGE UP
NRBC # BLD: 0 /100 WBCS — SIGNIFICANT CHANGE UP (ref 0–0)
OB PNL STL: NEGATIVE — SIGNIFICANT CHANGE UP
PH UR: 9 — HIGH (ref 5–8)
PLATELET # BLD AUTO: 413 K/UL — HIGH (ref 150–400)
POTASSIUM SERPL-MCNC: 2.8 MMOL/L — CRITICAL LOW (ref 3.5–5.3)
POTASSIUM SERPL-SCNC: 2.8 MMOL/L — CRITICAL LOW (ref 3.5–5.3)
PROT SERPL-MCNC: 7.9 G/DL — SIGNIFICANT CHANGE UP (ref 6–8.3)
PROT UR-MCNC: 30 MG/DL
RBC # BLD: 4.97 M/UL — SIGNIFICANT CHANGE UP (ref 4.2–5.8)
RBC # FLD: 13.9 % — SIGNIFICANT CHANGE UP (ref 10.3–14.5)
SODIUM SERPL-SCNC: 130 MMOL/L — LOW (ref 135–145)
SP GR SPEC: 1.02 — SIGNIFICANT CHANGE UP (ref 1.01–1.02)
UROBILINOGEN FLD QL: 1
WBC # BLD: 13.28 K/UL — HIGH (ref 3.8–10.5)
WBC # FLD AUTO: 13.28 K/UL — HIGH (ref 3.8–10.5)

## 2021-06-14 PROCEDURE — 99285 EMERGENCY DEPT VISIT HI MDM: CPT

## 2021-06-14 PROCEDURE — 74019 RADEX ABDOMEN 2 VIEWS: CPT | Mod: 26

## 2021-06-14 RX ORDER — POTASSIUM CHLORIDE 20 MEQ
10 PACKET (EA) ORAL ONCE
Refills: 0 | Status: COMPLETED | OUTPATIENT
Start: 2021-06-14 | End: 2021-06-14

## 2021-06-14 RX ORDER — POTASSIUM CHLORIDE 20 MEQ
40 PACKET (EA) ORAL ONCE
Refills: 0 | Status: COMPLETED | OUTPATIENT
Start: 2021-06-14 | End: 2021-06-14

## 2021-06-14 RX ORDER — SODIUM CHLORIDE 9 MG/ML
1000 INJECTION INTRAMUSCULAR; INTRAVENOUS; SUBCUTANEOUS ONCE
Refills: 0 | Status: COMPLETED | OUTPATIENT
Start: 2021-06-14 | End: 2021-06-14

## 2021-06-14 RX ADMIN — SODIUM CHLORIDE 1000 MILLILITER(S): 9 INJECTION INTRAMUSCULAR; INTRAVENOUS; SUBCUTANEOUS at 21:35

## 2021-06-14 NOTE — ED PROVIDER NOTE - ATTENDING CONTRIBUTION TO CARE
pt is a 51 yo male who was seen at outside ed a week ago for abd pain diagnosed with constipation and put on stool softners. since he has had increased abd pain , distention of his abd and diffuculty voiding with black stools. today the pain was very bad so he came to er for evaluation.    no fever no chills no n v d   he does feel constipated and has not had a regular bm in a few days.  on eval  non toxic well appearin gmale nad  heent cor chest normal  abd soft but tender to palp rlq and lower abd distended and with umbilical hernia no guard no rebound no cvat  ext normal  skin well suntanned  neuro normal   plan ct labs ua pain mgt and re assessment

## 2021-06-14 NOTE — ED PROVIDER NOTE - CLINICAL SUMMARY MEDICAL DECISION MAKING FREE TEXT BOX
51 yo male with no significant pmh presents to the ED c/o constipation and diffuse abdominal pain x 1 week. Patient went to [facility] for similar complaint, was given miralax for constipation (no labs/imaging) and was dced. Patient taking miralax with minimal improvement. Associated with nausea and vomiting. Patient had small BM earlier but states it was mainly liquid. Patient also c/o difficulty urinating and minimal output. + h/o umbilical hernia surg. PE: as above. A/P: labs, ua, abd xr, reassess 51 yo male with no significant pmh presents to the ED c/o constipation and diffuse abdominal pain x 1 week. Patient went to [facility] for similar complaint, was given miralax for constipation (no labs/imaging) and was dced. Patient taking miralax with minimal improvement. Associated with nausea and vomiting. Patient had small BM earlier but states it was mainly liquid. Patient also c/o difficulty urinating and minimal output. + h/o umbilical hernia surg. PE: as above. A/P: labs, ua, abd xr, ct abd/pelvis, reassess.

## 2021-06-14 NOTE — ED ADULT TRIAGE NOTE - CHIEF COMPLAINT QUOTE
pt was seene at [facility] last week and was on polyethyelene x 3 days and still has abd pain and vomiting and weakness

## 2021-06-14 NOTE — ED ADULT NURSE NOTE - CHIEF COMPLAINT QUOTE
pt was seene at [facility] last week and was on polyethyelene x 3 days and still has abd pain and vomiting and weakness

## 2021-06-14 NOTE — ED PROVIDER NOTE - PATIENT/CAREGIVER OFFERED  INTERPRETER SERVICES
Pt went unresponsive, dizzy on toilet - /48, , 99 % RA, pt not c/o chest pain, but diaphoretic, originally could state where she was, then started mumbling incoherently, word salad.  Dr. Becka ren, STAT CT of Head and labs entered; Dr. Esther ren.   yes

## 2021-06-14 NOTE — ED ADULT NURSE NOTE - OBJECTIVE STATEMENT
with  #[id number] - Silver. patient came in ED with c/o abdominal pain since last week. was s/e at [facility] where an NP prescribed him with medications but no relief. as per the patient + bloody stool since last week and today reported with coffee ground. denies dizziness. denies nausea and vomiting. afebrile. non-labored respiration noted. abdomen noted distended, tender. bladder scan for urine noted and 256 ml noted. relayed to GARRETT Leiva.

## 2021-06-14 NOTE — ED PROVIDER NOTE - OBJECTIVE STATEMENT
51 yo male with no significant pmh presents to the ED c/o constipation and diffuse abdominal pain x 1 week. Patient went to [facility] for similar complaint, was given miralax for constipation (no labs/imaging) and was dced. Patient taking miralax with minimal improvement. Associated with nausea and vomiting. Patient had small BM earlier but states it was mainly liquid. Patient also c/o difficulty urinating and minimal output. + h/o umbilical hernia surg. 51 yo male with no significant pmh presents to the ED c/o constipation and diffuse abdominal pain x 1 week. Patient went to [facility] for similar complaint, was given miralax for constipation (no labs/imaging) and was dced. Patient taking miralax with minimal improvement. Associated with nausea and vomiting. Patient had small BM earlier but states it was mainly liquid. Patient also c/o difficulty urinating and minimal output. + h/o umbilical hernia surg. Patient states he had dark/black stool on Tuesday followed by diarrhea on Wednesday. Stook now brown in color. no h/o gi bleed.     : [id number]

## 2021-06-15 DIAGNOSIS — L02.91 CUTANEOUS ABSCESS, UNSPECIFIED: ICD-10-CM

## 2021-06-15 DIAGNOSIS — K35.32 ACUTE APPENDICITIS WITH PERFORATION, LOCALIZED PERITONITIS, AND GANGRENE, WITHOUT ABSCESS: ICD-10-CM

## 2021-06-15 PROBLEM — F32.9 MAJOR DEPRESSIVE DISORDER, SINGLE EPISODE, UNSPECIFIED: Chronic | Status: ACTIVE | Noted: 2018-08-17

## 2021-06-15 LAB
ALBUMIN SERPL ELPH-MCNC: 2.1 G/DL — LOW (ref 3.3–5)
ALP SERPL-CCNC: 159 U/L — HIGH (ref 40–120)
ALT FLD-CCNC: 37 U/L — SIGNIFICANT CHANGE UP (ref 12–78)
ANION GAP SERPL CALC-SCNC: 8 MMOL/L — SIGNIFICANT CHANGE UP (ref 5–17)
APTT BLD: 24.6 SEC — LOW (ref 27.5–35.5)
AST SERPL-CCNC: 30 U/L — SIGNIFICANT CHANGE UP (ref 15–37)
BILIRUB SERPL-MCNC: 0.9 MG/DL — SIGNIFICANT CHANGE UP (ref 0.2–1.2)
BUN SERPL-MCNC: 8 MG/DL — SIGNIFICANT CHANGE UP (ref 7–23)
CALCIUM SERPL-MCNC: 7.5 MG/DL — LOW (ref 8.5–10.1)
CHLORIDE SERPL-SCNC: 98 MMOL/L — SIGNIFICANT CHANGE UP (ref 96–108)
CO2 SERPL-SCNC: 28 MMOL/L — SIGNIFICANT CHANGE UP (ref 22–31)
CREAT SERPL-MCNC: 0.59 MG/DL — SIGNIFICANT CHANGE UP (ref 0.5–1.3)
GLUCOSE SERPL-MCNC: 114 MG/DL — HIGH (ref 70–99)
HCT VFR BLD CALC: 36 % — LOW (ref 39–50)
HGB BLD-MCNC: 12.7 G/DL — LOW (ref 13–17)
INR BLD: 1.37 RATIO — HIGH (ref 0.88–1.16)
LACTATE SERPL-SCNC: 1.2 MMOL/L — SIGNIFICANT CHANGE UP (ref 0.7–2)
MCHC RBC-ENTMCNC: 29.2 PG — SIGNIFICANT CHANGE UP (ref 27–34)
MCHC RBC-ENTMCNC: 35.3 GM/DL — SIGNIFICANT CHANGE UP (ref 32–36)
MCV RBC AUTO: 82.8 FL — SIGNIFICANT CHANGE UP (ref 80–100)
NRBC # BLD: 0 /100 WBCS — SIGNIFICANT CHANGE UP (ref 0–0)
PLATELET # BLD AUTO: 390 K/UL — SIGNIFICANT CHANGE UP (ref 150–400)
POTASSIUM SERPL-MCNC: 3.5 MMOL/L — SIGNIFICANT CHANGE UP (ref 3.5–5.3)
POTASSIUM SERPL-SCNC: 3.5 MMOL/L — SIGNIFICANT CHANGE UP (ref 3.5–5.3)
PROT SERPL-MCNC: 6.7 G/DL — SIGNIFICANT CHANGE UP (ref 6–8.3)
PROTHROM AB SERPL-ACNC: 15.8 SEC — HIGH (ref 10.6–13.6)
RBC # BLD: 4.35 M/UL — SIGNIFICANT CHANGE UP (ref 4.2–5.8)
RBC # FLD: 14.1 % — SIGNIFICANT CHANGE UP (ref 10.3–14.5)
SARS-COV-2 RNA SPEC QL NAA+PROBE: SIGNIFICANT CHANGE UP
SODIUM SERPL-SCNC: 134 MMOL/L — LOW (ref 135–145)
WBC # BLD: 11.82 K/UL — HIGH (ref 3.8–10.5)
WBC # FLD AUTO: 11.82 K/UL — HIGH (ref 3.8–10.5)

## 2021-06-15 PROCEDURE — 99223 1ST HOSP IP/OBS HIGH 75: CPT

## 2021-06-15 PROCEDURE — 74177 CT ABD & PELVIS W/CONTRAST: CPT | Mod: 26,MG

## 2021-06-15 PROCEDURE — G1004: CPT

## 2021-06-15 PROCEDURE — 99222 1ST HOSP IP/OBS MODERATE 55: CPT

## 2021-06-15 PROCEDURE — 93010 ELECTROCARDIOGRAM REPORT: CPT

## 2021-06-15 RX ORDER — HYDROMORPHONE HYDROCHLORIDE 2 MG/ML
1 INJECTION INTRAMUSCULAR; INTRAVENOUS; SUBCUTANEOUS EVERY 4 HOURS
Refills: 0 | Status: DISCONTINUED | OUTPATIENT
Start: 2021-06-15 | End: 2021-06-17

## 2021-06-15 RX ORDER — HEPARIN SODIUM 5000 [USP'U]/ML
5000 INJECTION INTRAVENOUS; SUBCUTANEOUS EVERY 8 HOURS
Refills: 0 | Status: DISCONTINUED | OUTPATIENT
Start: 2021-06-15 | End: 2021-06-21

## 2021-06-15 RX ORDER — DEXTROSE MONOHYDRATE, SODIUM CHLORIDE, AND POTASSIUM CHLORIDE 50; .745; 4.5 G/1000ML; G/1000ML; G/1000ML
1000 INJECTION, SOLUTION INTRAVENOUS
Refills: 0 | Status: DISCONTINUED | OUTPATIENT
Start: 2021-06-15 | End: 2021-06-18

## 2021-06-15 RX ORDER — PIPERACILLIN AND TAZOBACTAM 4; .5 G/20ML; G/20ML
3.38 INJECTION, POWDER, LYOPHILIZED, FOR SOLUTION INTRAVENOUS EVERY 8 HOURS
Refills: 0 | Status: DISCONTINUED | OUTPATIENT
Start: 2021-06-15 | End: 2021-06-21

## 2021-06-15 RX ORDER — HYDROMORPHONE HYDROCHLORIDE 2 MG/ML
0.5 INJECTION INTRAMUSCULAR; INTRAVENOUS; SUBCUTANEOUS EVERY 4 HOURS
Refills: 0 | Status: DISCONTINUED | OUTPATIENT
Start: 2021-06-15 | End: 2021-06-17

## 2021-06-15 RX ORDER — SERTRALINE 25 MG/1
50 TABLET, FILM COATED ORAL DAILY
Refills: 0 | Status: DISCONTINUED | OUTPATIENT
Start: 2021-06-15 | End: 2021-06-21

## 2021-06-15 RX ORDER — DEXTROSE MONOHYDRATE, SODIUM CHLORIDE, AND POTASSIUM CHLORIDE 50; .745; 4.5 G/1000ML; G/1000ML; G/1000ML
1000 INJECTION, SOLUTION INTRAVENOUS
Refills: 0 | Status: DISCONTINUED | OUTPATIENT
Start: 2021-06-15 | End: 2021-06-15

## 2021-06-15 RX ORDER — HYDROXYZINE HCL 10 MG
10 TABLET ORAL DAILY
Refills: 0 | Status: DISCONTINUED | OUTPATIENT
Start: 2021-06-15 | End: 2021-06-16

## 2021-06-15 RX ORDER — PIPERACILLIN AND TAZOBACTAM 4; .5 G/20ML; G/20ML
3.38 INJECTION, POWDER, LYOPHILIZED, FOR SOLUTION INTRAVENOUS ONCE
Refills: 0 | Status: COMPLETED | OUTPATIENT
Start: 2021-06-15 | End: 2021-06-15

## 2021-06-15 RX ORDER — SODIUM CHLORIDE 9 MG/ML
1000 INJECTION INTRAMUSCULAR; INTRAVENOUS; SUBCUTANEOUS ONCE
Refills: 0 | Status: COMPLETED | OUTPATIENT
Start: 2021-06-15 | End: 2021-06-15

## 2021-06-15 RX ADMIN — DEXTROSE MONOHYDRATE, SODIUM CHLORIDE, AND POTASSIUM CHLORIDE 100 MILLILITER(S): 50; .745; 4.5 INJECTION, SOLUTION INTRAVENOUS at 05:40

## 2021-06-15 RX ADMIN — HYDROMORPHONE HYDROCHLORIDE 1 MILLIGRAM(S): 2 INJECTION INTRAMUSCULAR; INTRAVENOUS; SUBCUTANEOUS at 04:14

## 2021-06-15 RX ADMIN — PIPERACILLIN AND TAZOBACTAM 25 GRAM(S): 4; .5 INJECTION, POWDER, LYOPHILIZED, FOR SOLUTION INTRAVENOUS at 10:28

## 2021-06-15 RX ADMIN — SODIUM CHLORIDE 1000 MILLILITER(S): 9 INJECTION INTRAMUSCULAR; INTRAVENOUS; SUBCUTANEOUS at 00:49

## 2021-06-15 RX ADMIN — Medication 40 MILLIEQUIVALENT(S): at 00:25

## 2021-06-15 RX ADMIN — HYDROMORPHONE HYDROCHLORIDE 1 MILLIGRAM(S): 2 INJECTION INTRAMUSCULAR; INTRAVENOUS; SUBCUTANEOUS at 00:00

## 2021-06-15 RX ADMIN — Medication 100 MILLIEQUIVALENT(S): at 00:25

## 2021-06-15 RX ADMIN — SODIUM CHLORIDE 1000 MILLILITER(S): 9 INJECTION INTRAMUSCULAR; INTRAVENOUS; SUBCUTANEOUS at 00:25

## 2021-06-15 RX ADMIN — HYDROMORPHONE HYDROCHLORIDE 1 MILLIGRAM(S): 2 INJECTION INTRAMUSCULAR; INTRAVENOUS; SUBCUTANEOUS at 11:33

## 2021-06-15 RX ADMIN — PIPERACILLIN AND TAZOBACTAM 25 GRAM(S): 4; .5 INJECTION, POWDER, LYOPHILIZED, FOR SOLUTION INTRAVENOUS at 17:36

## 2021-06-15 RX ADMIN — SODIUM CHLORIDE 1000 MILLILITER(S): 9 INJECTION INTRAMUSCULAR; INTRAVENOUS; SUBCUTANEOUS at 01:49

## 2021-06-15 RX ADMIN — Medication 100 MILLIEQUIVALENT(S): at 04:14

## 2021-06-15 RX ADMIN — Medication 10 MILLIEQUIVALENT(S): at 01:30

## 2021-06-15 RX ADMIN — PIPERACILLIN AND TAZOBACTAM 200 GRAM(S): 4; .5 INJECTION, POWDER, LYOPHILIZED, FOR SOLUTION INTRAVENOUS at 01:50

## 2021-06-15 RX ADMIN — SERTRALINE 50 MILLIGRAM(S): 25 TABLET, FILM COATED ORAL at 17:36

## 2021-06-15 NOTE — H&P ADULT - ATTENDING COMMENTS
Patient seen and examined with surgical PA.  The recent CT scan report and images were reviewed and revealed a 13.7 cm abscess in the right lower quadrant with an adjacent focus of pneumoperitoneum which was felt to be secondary to perforated appendicitis.  The appendix itself was not visualized.  I discussed these findings with the patient and have consulted IR for percutaneous drainage of the abscess.  Will also consult ID.  In the interim, he has been started on Zosyn and will remain NPO until his abdominal tenderness improves. Patient seen and examined with surgical PA.  The recent CT scan report and images were reviewed and revealed a 13.7 cm abscess in the right lower quadrant with an adjacent focus of pneumoperitoneum which was felt to be secondary to perforated appendicitis.  The appendix itself was not visualized.  On exam, his abdomen is distended with right-sided tenderness.  I discussed these findings with the patient and have consulted IR for percutaneous drainage of the abscess.  ID has also been consulted.  In the interim, he has been started on Zosyn and will remain NPO with IV fluids.

## 2021-06-15 NOTE — ED ADULT NURSE REASSESSMENT NOTE - NS ED NURSE REASSESS COMMENT FT1
pt evaluated at bedside by surgical PA
s/w pt using  ID#:[id number].  explained to pt CT results and that surgical PA will be coming to evaluate pt.  additional blood work drawn and sent to lab, blood cultures x2, lactate and T&S drawn.  covid swab obtained and sent.  IV zosyn initiated.  all questions answered in great detail.  NPO maintained.  EKG completed.  pt aware plan for admission.
Received report from LESLIE Johnson.  pt given PO and IV potassium to replenish serum potassium K=2.8.  EKG complete.  pt to be transported to CT scan via stretcher.

## 2021-06-15 NOTE — H&P ADULT - HISTORY OF PRESENT ILLNESS
49 y/o M no pmhx presents to [place] ED c/o generalized abdominal pain x1 week with constipation. Pt states he was seen at [facility] and was sent home on stool softeners which provided minimal relief. PT states the pain has not improved however he did have small liquid bowel movements with the stool softener, however the persistent pain prompted him to visit the ED once again. At this time pt c/o continued pain without N/V, or radiation. PT describes pain as sharp in lower abdominal. PT denies chest pain, SOB, palpitations, fevers, chills or hematochezia.

## 2021-06-15 NOTE — CONSULT NOTE ADULT - SUBJECTIVE AND OBJECTIVE BOX
[facility]  INFECTIOUS DISEASES   [address] [place] [address]  Tel: [phone]     Fax: [phone]  =======================================================    MRN-[id number]  JEANE HASTINGS     CC: abdominal pain    HPI:  51 y/o man with no significant PMH was admitted at [facility] on 6/15 with generalized abdominal pain for one week. He was seen in [facility] and sent home with stool softener but due to worsening of pain came back to ED. CT showed perforated appendicitis so was admitted for IV Abx and observation.   Seen by surgery no surgical intervention is planned for now.   He has been started on zosyn and ID was called for further recommendation.     PAST MEDICAL & SURGICAL HISTORY:  Depression  No significant past surgical history    Social Hx: no smoking, ETOH or drugs     FAMILY HISTORY:  No pertinent family history in first degree relatives    Allergies  No Known Allergies    Antibiotics:  piperacillin/tazobactam IVPB.. 3.375 Gram(s) IV Intermittent every 8 hours    REVIEW OF SYSTEMS:  CONSTITUTIONAL:  No Fever or chills  HEENT:  No diplopia or blurred vision.  No sore throat or runny nose.  CARDIOVASCULAR:  No chest pain or SOB.  RESPIRATORY:  No cough, shortness of breath, PND or orthopnea.  GASTROINTESTINAL:  No nausea, vomiting or diarrhea. + abdominal pain  GENITOURINARY:  No dysuria, frequency or urgency. No Blood in urine  MUSCULOSKELETAL:  no joint aches, no muscle pain  SKIN:  No change in skin, hair or nails.  NEUROLOGIC:  No paresthesias, fasciculations, seizures or weakness.  PSYCHIATRIC:  No disorder of thought or mood.  ENDOCRINE:  No heat or cold intolerance, polyuria or polydipsia.  HEMATOLOGICAL:  No easy bruising or bleeding.     Physical Exam:  Vital Signs Last 24 Hrs  T(C): 37 (15 Felix 2021 11:21), Max: 37.1 (15 Felix 2021 04:15)  T(F): 98.6 (15 Felix 2021 11:21), Max: 98.8 (15 Felix 2021 04:15)  HR: 78 (15 Felix 2021 11:21) (63 - 81)  BP: 106/70 (15 Felix 2021 11:21) (70/60 - 129/70)  BP(mean): --  RR: 18 (15 Felix 2021 11:21) (15 - 18)  SpO2: 92% (15 Felix 2021 11:21) (92% - 100%)  Height (cm): 162.6 ( @ 19:50)  Weight (kg): 98 ( @ 19:50)  BMI (kg/m2): 37.1 ( @ 19:50)  BSA (m2): 2.02 ( @ 19:50)  GEN: NAD  HEENT: normocephalic and atraumatic. EOMI. PERRL.    NECK: Supple.  No lymphadenopathy   LUNGS: Clear to auscultation.  HEART: Regular rate and rhythm without murmur.  ABDOMEN: Soft, and nondistended, tender in RLQ.  Positive bowel sounds.    : No CVA tenderness  EXTREMITIES: Without any cyanosis, clubbing, rash, lesions or edema.  NEUROLOGIC: grossly intact.  PSYCHIATRIC: Appropriate affect .  SKIN: No ulceration or induration present.    Labs:  06-15    134<L>  |  98  |  8   ----------------------------<  114<H>  3.5   |  28  |  0.59    Ca    7.5<L>      15 Felix 2021 05:22    TPro  6.7  /  Alb  2.1<L>  /  TBili  0.9  /  DBili  x   /  AST  30  /  ALT  37  /  AlkPhos  159<H>  06-15                        12.7   11.82 )-----------( 390      ( 15 Felix 2021 05:22 )             36.0     PT/INR - ( 15 Felix 2021 02:17 )   PT: 15.8 sec;   INR: 1.37 ratio    PTT - ( 15 Felix 2021 02:17 )  PTT:24.6 sec  Urinalysis Basic - ( 2021 22:30 )    Color: Yellow / Appearance: Clear / S.020 / pH: x  Gluc: x / Ketone: Negative  / Bili: Negative / Urobili: 1   Blood: x / Protein: 30 mg/dL / Nitrite: Negative   Leuk Esterase: Negative / RBC: 0-2 /HPF / WBC 0-2   Sq Epi: x / Non Sq Epi: Occasional / Bacteria: Occasional    LIVER FUNCTIONS - ( 15 Felix 2021 05:22 )  Alb: 2.1 g/dL / Pro: 6.7 g/dL / ALK PHOS: 159 U/L / ALT: 37 U/L / AST: 30 U/L / GGT: x           COVID-19 PCR: NotDetec (06-15-21 @ 02:17)    All imaging and other data have been reviewed.  < from: CT Abdomen and Pelvis w/ IV Cont (06.15.21 @ 01:30) >  EXAM:  CT ABDOMEN AND PELVIS IC                        PROCEDURE DATE:  06/15/2021    INTERPRETATION:  CLINICAL INFORMATION: 50 years old. Male. RLQ abdominal pain.  COMPARISON: None.  CONTRAST/COMPLICATIONS:  IV Contrast: Omnipaque 350  90 cc administered  10 cc discarded.  Oral Contrast: NONE  Complications: None reported at time of study completion  PROCEDURE:  CT of the Abdomen and Pelvis was performed.  Sagittal and coronal reformats were performed.  FINDINGS:  LOWER CHEST: Mild bibasilar atelectasis.  LIVER: Within normal limits.  BILE DUCTS: Normal caliber.  GALLBLADDER: Within normal limits.  SPLEEN: Within normal limits.  PANCREAS: Within normal limits.  ADRENALS: Within normal limits.  KIDNEYS/URETERS: Enhance symmetrically. No hydronephrosis.  BLADDER: Within normal limits.  REPRODUCTIVE ORGANS: Within normal limits.  BOWEL: No bowel obstruction. Appendix is is not definitively visualized. There is a large heterogeneous peripherally enhancing fluid collection in the right lower quadrant measure roughly 6.1 x 7.5 x 13.7 cm (AP, TR, CC), with adjacent foci of pneumoperitoneum.  PERITONEUM: No ascites.  VESSELS: Normal caliber abdominal aorta.  RETROPERITONEUM/LYMPH NODES: No lymphadenopathy.  ABDOMINAL WALL: Within normal limits.  BONES: Degenerative changes in the spine.  IMPRESSION:  Findings concerning for contained perforated appendicitis with a large heterogeneous abscess.    Assessment and Plan:   51 y/o man with no significant PMH was admitted at [facility] on 6/15 with generalized abdominal pain for one week.   CT showed perforated appendicitis.  Leukocytosis 13k  UA negative     Perforated appendicitis  - Blood culture x 2   - Continue zosyn 3.375gm q8h  - Surgery is following,   - Possible repeat CT in near future  - Frequent abdominal exam    Thank you for courtesy of this consult.     Will follow.    Chong Winslow MD  Division of Infectious Diseases   Cell [phone] between 8am and 6pm   After 6pm and weekends please call ID service at [phone].

## 2021-06-15 NOTE — PATIENT PROFILE ADULT - SAFE PLACE TO LIVE
Recommend 3 Ensure Enlive/day (provides additional 1,050 calories, 60 grams protein) and 2 Benny packets (provides additional 160 calories, 28 grams amino acids). Discussed with NP.
no

## 2021-06-15 NOTE — H&P ADULT - NSHPLABSRESULTS_GEN_ALL_CORE
14.2   13.28 )-----------( 413      ( 14 Jun 2021 21:30 )             40.7   06-14    130<L>  |  87<L>  |  11  ----------------------------<  119<H>  2.8<LL>   |  31  |  0.72    Ca    8.6      14 Jun 2021 21:30    TPro  7.9  /  Alb  2.5<L>  /  TBili  0.8  /  DBili  x   /  AST  40<H>  /  ALT  50  /  AlkPhos  195<H>  06-14      Imaging:  < from: CT Abdomen and Pelvis w/ IV Cont (06.15.21 @ 01:30) >      EXAM:  CT ABDOMEN AND PELVIS IC                            PROCEDURE DATE:  06/15/2021          INTERPRETATION:  CLINICAL INFORMATION: 50 years old. Male. RLQ abdominal pain.    COMPARISON: None.    CONTRAST/COMPLICATIONS:  IV Contrast: Omnipaque 350  90 cc administered  10 cc discarded.  Oral Contrast: NONE  Complications: None reported at time of study completion    PROCEDURE:  CT of the Abdomen and Pelvis was performed.  Sagittal and coronal reformats were performed.    FINDINGS:    LOWER CHEST: Mild bibasilar atelectasis.    LIVER: Within normal limits.  BILE DUCTS: Normal caliber.  GALLBLADDER: Within normal limits.  SPLEEN: Within normal limits.  PANCREAS: Within normal limits.  ADRENALS: Within normal limits.  KIDNEYS/URETERS: Enhance symmetrically. No hydronephrosis.    BLADDER: Within normal limits.  REPRODUCTIVE ORGANS: Within normal limits.    BOWEL: No bowel obstruction. Appendix is is not definitively visualized. There is a large heterogeneous peripherally enhancing fluid collection in the right lower quadrant measure roughly 6.1 x 7.5 x 13.7 cm (AP, TR, CC), with adjacent foci of pneumoperitoneum.  PERITONEUM: No ascites.  VESSELS: Normal caliber abdominal aorta.  RETROPERITONEUM/LYMPH NODES: No lymphadenopathy.  ABDOMINAL WALL: Within normal limits.  BONES: Degenerative changes in the spine.    IMPRESSION:    Findings concerning for contained perforated appendicitis with a large heterogeneous abscess.    Findings discussed with Dr. Zurita on 6/15/2021 1:39 AM with readback.      THUY KING MD; Attending Radiologist  This document has been electronically signed. Felix 15 2021  1:41AM    < end of copied text >

## 2021-06-15 NOTE — PATIENT PROFILE ADULT - STATED REASON FOR ADMISSION
Pt was having abdominal pain x 1 week, went to [facility] and sent home with stool softeners

## 2021-06-15 NOTE — H&P ADULT - PROBLEM SELECTOR PLAN 1
Assessment:  51 y/o M no pmhx presenting to [facility] ED c/o generalized abdominal pain x1 week now localized to RLQ without n/v or radiation, with leukocytosis of 13.28 with hypokalemia 2.8, hyponatremia 130, and CT abd/pelv findings concerning for perforated appendicitis with abscess,     Plan:  - admit to surgery  - NPO, IVF, IV Zosyn  - will likely benefit from IR drainage of abscess  - replete electrolytes   - ID consult in am for abx recommendations  - pain control, supportive care  - zofran prn for nausea  - serial abdominal exams  - labs in am  - Dr. Calvillo aware

## 2021-06-15 NOTE — H&P ADULT - NSHPPHYSICALEXAM_GEN_ALL_CORE
PHYSICAL EXAM:  GENERAL: NAD, well-developed  CHEST/LUNG: CTABL, no ronchi, rales or wheezing  HEART: RRR, no MRGs  ABDOMEN: Soft, protuberant, RLQ ttp, nondistended, +bs  EXTREMITIES:  2+ Peripheral Pulses, No clubbing, cyanosis, or edema  PSYCH: AAOx3  NEUROLOGY: non-focal  SKIN: No rashes or lesions

## 2021-06-16 LAB
ANION GAP SERPL CALC-SCNC: 6 MMOL/L — SIGNIFICANT CHANGE UP (ref 5–17)
BUN SERPL-MCNC: 5 MG/DL — LOW (ref 7–23)
CALCIUM SERPL-MCNC: 7.9 MG/DL — LOW (ref 8.5–10.1)
CHLORIDE SERPL-SCNC: 101 MMOL/L — SIGNIFICANT CHANGE UP (ref 96–108)
CO2 SERPL-SCNC: 27 MMOL/L — SIGNIFICANT CHANGE UP (ref 22–31)
COVID-19 SPIKE DOMAIN AB INTERP: POSITIVE
COVID-19 SPIKE DOMAIN ANTIBODY RESULT: >250 U/ML — HIGH
CREAT SERPL-MCNC: 0.59 MG/DL — SIGNIFICANT CHANGE UP (ref 0.5–1.3)
CULTURE RESULTS: SIGNIFICANT CHANGE UP
GLUCOSE SERPL-MCNC: 118 MG/DL — HIGH (ref 70–99)
HCT VFR BLD CALC: 37.5 % — LOW (ref 39–50)
HGB BLD-MCNC: 12.8 G/DL — LOW (ref 13–17)
MCHC RBC-ENTMCNC: 28.6 PG — SIGNIFICANT CHANGE UP (ref 27–34)
MCHC RBC-ENTMCNC: 34.1 GM/DL — SIGNIFICANT CHANGE UP (ref 32–36)
MCV RBC AUTO: 83.9 FL — SIGNIFICANT CHANGE UP (ref 80–100)
NRBC # BLD: 0 /100 WBCS — SIGNIFICANT CHANGE UP (ref 0–0)
PLATELET # BLD AUTO: 381 K/UL — SIGNIFICANT CHANGE UP (ref 150–400)
POTASSIUM SERPL-MCNC: 3.8 MMOL/L — SIGNIFICANT CHANGE UP (ref 3.5–5.3)
POTASSIUM SERPL-SCNC: 3.8 MMOL/L — SIGNIFICANT CHANGE UP (ref 3.5–5.3)
RBC # BLD: 4.47 M/UL — SIGNIFICANT CHANGE UP (ref 4.2–5.8)
RBC # FLD: 14.5 % — SIGNIFICANT CHANGE UP (ref 10.3–14.5)
SARS-COV-2 IGG+IGM SERPL QL IA: >250 U/ML — HIGH
SARS-COV-2 IGG+IGM SERPL QL IA: POSITIVE
SODIUM SERPL-SCNC: 134 MMOL/L — LOW (ref 135–145)
SPECIMEN SOURCE: SIGNIFICANT CHANGE UP
WBC # BLD: 10.83 K/UL — HIGH (ref 3.8–10.5)
WBC # FLD AUTO: 10.83 K/UL — HIGH (ref 3.8–10.5)

## 2021-06-16 PROCEDURE — 77012 CT SCAN FOR NEEDLE BIOPSY: CPT | Mod: 26

## 2021-06-16 PROCEDURE — 10160 PNXR ASPIR ABSC HMTMA BULLA: CPT

## 2021-06-16 PROCEDURE — 99232 SBSQ HOSP IP/OBS MODERATE 35: CPT

## 2021-06-16 RX ORDER — ACETAMINOPHEN 500 MG
1000 TABLET ORAL EVERY 6 HOURS
Refills: 0 | Status: DISCONTINUED | OUTPATIENT
Start: 2021-06-16 | End: 2021-06-17

## 2021-06-16 RX ORDER — IBUPROFEN 200 MG
400 TABLET ORAL EVERY 8 HOURS
Refills: 0 | Status: DISCONTINUED | OUTPATIENT
Start: 2021-06-16 | End: 2021-06-17

## 2021-06-16 RX ORDER — SENNA PLUS 8.6 MG/1
2 TABLET ORAL AT BEDTIME
Refills: 0 | Status: DISCONTINUED | OUTPATIENT
Start: 2021-06-16 | End: 2021-06-21

## 2021-06-16 RX ORDER — PANTOPRAZOLE SODIUM 20 MG/1
40 TABLET, DELAYED RELEASE ORAL
Refills: 0 | Status: DISCONTINUED | OUTPATIENT
Start: 2021-06-16 | End: 2021-06-21

## 2021-06-16 RX ORDER — ALPRAZOLAM 0.25 MG
0.5 TABLET ORAL ONCE
Refills: 0 | Status: DISCONTINUED | OUTPATIENT
Start: 2021-06-16 | End: 2021-06-16

## 2021-06-16 RX ADMIN — Medication 1000 MILLIGRAM(S): at 18:50

## 2021-06-16 RX ADMIN — HEPARIN SODIUM 5000 UNIT(S): 5000 INJECTION INTRAVENOUS; SUBCUTANEOUS at 21:37

## 2021-06-16 RX ADMIN — SENNA PLUS 2 TABLET(S): 8.6 TABLET ORAL at 21:37

## 2021-06-16 RX ADMIN — Medication 1000 MILLIGRAM(S): at 17:56

## 2021-06-16 RX ADMIN — HYDROMORPHONE HYDROCHLORIDE 1 MILLIGRAM(S): 2 INJECTION INTRAMUSCULAR; INTRAVENOUS; SUBCUTANEOUS at 01:42

## 2021-06-16 RX ADMIN — HYDROMORPHONE HYDROCHLORIDE 1 MILLIGRAM(S): 2 INJECTION INTRAMUSCULAR; INTRAVENOUS; SUBCUTANEOUS at 03:01

## 2021-06-16 RX ADMIN — PIPERACILLIN AND TAZOBACTAM 25 GRAM(S): 4; .5 INJECTION, POWDER, LYOPHILIZED, FOR SOLUTION INTRAVENOUS at 17:56

## 2021-06-16 RX ADMIN — Medication 1000 MILLIGRAM(S): at 22:52

## 2021-06-16 RX ADMIN — PIPERACILLIN AND TAZOBACTAM 25 GRAM(S): 4; .5 INJECTION, POWDER, LYOPHILIZED, FOR SOLUTION INTRAVENOUS at 11:40

## 2021-06-16 RX ADMIN — PIPERACILLIN AND TAZOBACTAM 25 GRAM(S): 4; .5 INJECTION, POWDER, LYOPHILIZED, FOR SOLUTION INTRAVENOUS at 01:24

## 2021-06-16 RX ADMIN — Medication 0.5 MILLIGRAM(S): at 14:05

## 2021-06-16 RX ADMIN — DEXTROSE MONOHYDRATE, SODIUM CHLORIDE, AND POTASSIUM CHLORIDE 125 MILLILITER(S): 50; .745; 4.5 INJECTION, SOLUTION INTRAVENOUS at 18:00

## 2021-06-16 NOTE — PROCEDURE NOTE - PROCEDURE FINDINGS AND DETAILS
10 fr drain placed into rlq fluid collection. ~70 cc purulent fluid aspirated and sent for cx. drain connected to bulb.

## 2021-06-16 NOTE — PRE PROCEDURE NOTE - PRE PROCEDURE EVALUATION
Interventional Radiology Pre-Procedure Note    Patient is a 50y old  Male who presents with perforated appendicitis and abscess formation. Pt is here for drainage of rlq fluid collection     PAST MEDICAL & SURGICAL HISTORY:  Depression    No significant past surgical history         Allergies: No Known Allergies      LABS:                        12.8   10.83 )-----------( 381      ( 16 Jun 2021 10:42 )             37.5     06-16    134<L>  |  101  |  5<L>  ----------------------------<  118<H>  3.8   |  27  |  0.59    Ca    7.9<L>      16 Jun 2021 10:42    TPro  6.7  /  Alb  2.1<L>  /  TBili  0.9  /  DBili  x   /  AST  30  /  ALT  37  /  AlkPhos  159<H>  06-15    PT/INR - ( 15 Felix 2021 02:17 )   PT: 15.8 sec;   INR: 1.37 ratio         PTT - ( 15 Felix 2021 02:17 )  PTT:24.6 sec    Procedure/ risks/ benefits were explained, informed consent obtained from patient, verbalizes understanding.

## 2021-06-16 NOTE — PROGRESS NOTE ADULT - PROBLEM SELECTOR PLAN 1
PLAN  - Will discuss with Dr. Calvillo  - IR drainage today  - cont IV abx  - pain control, supportive care  - OOB  - NPO, IVF   - serial abdominal exams  - labs in am    Surgical Team Contact Information  Spectralink: Ext: [phone] or [phone]  Pager: [phone]

## 2021-06-16 NOTE — PROGRESS NOTE ADULT - ASSESSMENT
50y Male with perforated appendicitis with abscess currently constipated awaiting IR drainage of abscess

## 2021-06-16 NOTE — PROGRESS NOTE ADULT - SUBJECTIVE AND OBJECTIVE BOX
SUBJECTIVE:  Patient seen and examined at bedside.  No overnight events.  Patient with complaint of constipation, currently NPO, admits to flatus and denies bowel movement.  Patient denies any fevers, chills, chest pain, shortness of breath, abdominal pain, nausea, vomiting or diarrhea.    Vital Signs Last 24 Hrs  T(C): 36.9 (2021 04:15), Max: 37.7 (15 Felix 2021 19:58)  T(F): 98.4 (2021 04:15), Max: 99.9 (15 Felix 2021 19:58)  HR: 63 (2021 04:15) (63 - 77)  BP: 106/60 (2021 04:15) (106/60 - 116/76)  BP(mean): --  RR: 17 (2021 04:15) (17 - 17)  SpO2: 95% (2021 04:15) (91% - 95%)    PHYSICAL EXAM:  GENERAL: No acute distress, well-developed  HEAD:  Atraumatic, Normocephalic  ABDOMEN: Soft, tender greatest in RLQ, minimally-distended; bowel sounds+  NEUROLOGY: A&O x 3, no focal deficits    I&O's Summary    15 Felix 2021 07:  -  2021 07:00  --------------------------------------------------------  IN: 2900 mL / OUT: 500 mL / NET: 2400 mL      I&O's Detail    15 Felix 2021 07:01  -  2021 07:00  --------------------------------------------------------  IN:    dextrose 5% + sodium chloride 0.45% w/ Additives: 300 mL    dextrose 5% + sodium chloride 0.9% w/ Additives: 2500 mL    IV PiggyBack: 100 mL  Total IN: 2900 mL    OUT:    Voided (mL): 500 mL  Total OUT: 500 mL    Total NET: 2400 mL        MEDICATIONS  (STANDING):  dextrose 5% + sodium chloride 0.9% with potassium chloride 20 mEq/L 1000 milliLiter(s) (125 mL/Hr) IV Continuous <Continuous>  heparin   Injectable 5000 Unit(s) SubCutaneous every 8 hours  hydrOXYzine hydrochloride Injectable 10 milliGRAM(s) IntraMuscular daily  piperacillin/tazobactam IVPB.. 3.375 Gram(s) IV Intermittent every 8 hours  sertraline 50 milliGRAM(s) Oral daily    MEDICATIONS  (PRN):  HYDROmorphone  Injectable 0.5 milliGRAM(s) IV Push every 4 hours PRN Moderate Pain (4 - 6)  HYDROmorphone  Injectable 1 milliGRAM(s) IV Push every 4 hours PRN Severe Pain (7 - 10)    LABS:                        12.8   10.83 )-----------( 381      ( 2021 10:42 )             37.5     06-16    134<L>  |  101  |  5<L>  ----------------------------<  118<H>  3.8   |  27  |  0.59    Ca    7.9<L>      2021 10:42    TPro  6.7  /  Alb  2.1<L>  /  TBili  0.9  /  DBili  x   /  AST  30  /  ALT  37  /  AlkPhos  159<H>  06-15    PT/INR - ( 15 Felix 2021 02:17 )   PT: 15.8 sec;   INR: 1.37 ratio         PTT - ( 15 Felix 2021 02:17 )  PTT:24.6 sec  Urinalysis Basic - ( 2021 22:30 )    Color: Yellow / Appearance: Clear / S.020 / pH: x  Gluc: x / Ketone: Negative  / Bili: Negative / Urobili: 1   Blood: x / Protein: 30 mg/dL / Nitrite: Negative   Leuk Esterase: Negative / RBC: 0-2 /HPF / WBC 0-2   Sq Epi: x / Non Sq Epi: Occasional / Bacteria: Occasional      RADIOLOGY & ADDITIONAL STUDIES:  no new Conversation via  phone:    SUBJECTIVE:  Patient seen and examined at bedside.  No overnight events.  Patient with complaint of constipation, currently NPO, admits to flatus and denies bowel movement.  Patient denies any fevers, chills, chest pain, shortness of breath, abdominal pain, nausea, vomiting or diarrhea.    Vital Signs Last 24 Hrs  T(C): 36.9 (2021 04:15), Max: 37.7 (15 Felix 2021 19:58)  T(F): 98.4 (2021 04:15), Max: 99.9 (15 Felix 2021 19:58)  HR: 63 (2021 04:15) (63 - 77)  BP: 106/60 (2021 04:15) (106/60 - 116/76)  BP(mean): --  RR: 17 (2021 04:15) (17 - 17)  SpO2: 95% (2021 04:15) (91% - 95%)    PHYSICAL EXAM:  GENERAL: No acute distress, well-developed  HEAD:  Atraumatic, Normocephalic  ABDOMEN: Soft, tender greatest in RLQ, minimally-distended; bowel sounds+  NEUROLOGY: A&O x 3, no focal deficits    I&O's Summary    15 Felix 2021 07:  -  2021 07:00  --------------------------------------------------------  IN: 2900 mL / OUT: 500 mL / NET: 2400 mL      I&O's Detail    15 Felix 2021 07:01  -  2021 07:00  --------------------------------------------------------  IN:    dextrose 5% + sodium chloride 0.45% w/ Additives: 300 mL    dextrose 5% + sodium chloride 0.9% w/ Additives: 2500 mL    IV PiggyBack: 100 mL  Total IN: 2900 mL    OUT:    Voided (mL): 500 mL  Total OUT: 500 mL    Total NET: 2400 mL        MEDICATIONS  (STANDING):  dextrose 5% + sodium chloride 0.9% with potassium chloride 20 mEq/L 1000 milliLiter(s) (125 mL/Hr) IV Continuous <Continuous>  heparin   Injectable 5000 Unit(s) SubCutaneous every 8 hours  hydrOXYzine hydrochloride Injectable 10 milliGRAM(s) IntraMuscular daily  piperacillin/tazobactam IVPB.. 3.375 Gram(s) IV Intermittent every 8 hours  sertraline 50 milliGRAM(s) Oral daily    MEDICATIONS  (PRN):  HYDROmorphone  Injectable 0.5 milliGRAM(s) IV Push every 4 hours PRN Moderate Pain (4 - 6)  HYDROmorphone  Injectable 1 milliGRAM(s) IV Push every 4 hours PRN Severe Pain (7 - 10)    LABS:                        12.8   10.83 )-----------( 381      ( 2021 10:42 )             37.5     06-16    134<L>  |  101  |  5<L>  ----------------------------<  118<H>  3.8   |  27  |  0.59    Ca    7.9<L>      2021 10:42    TPro  6.7  /  Alb  2.1<L>  /  TBili  0.9  /  DBili  x   /  AST  30  /  ALT  37  /  AlkPhos  159<H>  06-15    PT/INR - ( 15 Felix 2021 02:17 )   PT: 15.8 sec;   INR: 1.37 ratio         PTT - ( 15 Felix 2021 02:17 )  PTT:24.6 sec  Urinalysis Basic - ( 2021 22:30 )    Color: Yellow / Appearance: Clear / S.020 / pH: x  Gluc: x / Ketone: Negative  / Bili: Negative / Urobili: 1   Blood: x / Protein: 30 mg/dL / Nitrite: Negative   Leuk Esterase: Negative / RBC: 0-2 /HPF / WBC 0-2   Sq Epi: x / Non Sq Epi: Occasional / Bacteria: Occasional      RADIOLOGY & ADDITIONAL STUDIES:  no new

## 2021-06-16 NOTE — PROGRESS NOTE ADULT - SUBJECTIVE AND OBJECTIVE BOX
[facility]  INFECTIOUS DISEASES   [address] [place] [address]  Tel: [phone]     Fax: [phone]  =======================================================    MRN-[id number]  JEANE DOBLADILLO     Follow up; Appendicitis     Doing better, No fever. For possible IR drainage today.     PAST MEDICAL & SURGICAL HISTORY:  Depression  No significant past surgical history    Social Hx: no smoking, ETOH or drugs     FAMILY HISTORY:  No pertinent family history in first degree relatives    Allergies  No Known Allergies    Antibiotics:  piperacillin/tazobactam IVPB.. 3.375 Gram(s) IV Intermittent every 8 hours    REVIEW OF SYSTEMS:  CONSTITUTIONAL:  No Fever or chills  HEENT:  No diplopia or blurred vision.  No sore throat or runny nose.  CARDIOVASCULAR:  No chest pain or SOB.  RESPIRATORY:  No cough, shortness of breath, PND or orthopnea.  GASTROINTESTINAL:  No nausea, vomiting or diarrhea. + abdominal pain  GENITOURINARY:  No dysuria, frequency or urgency. No Blood in urine  MUSCULOSKELETAL:  no joint aches, no muscle pain  SKIN:  No change in skin, hair or nails.  NEUROLOGIC:  No paresthesias, fasciculations, seizures or weakness.  PSYCHIATRIC:  No disorder of thought or mood.  ENDOCRINE:  No heat or cold intolerance, polyuria or polydipsia.  HEMATOLOGICAL:  No easy bruising or bleeding.     Physical Exam:  Vital Signs Last 24 Hrs  T(C): 37.1 (16 Jun 2021 14:25), Max: 37.7 (15 Felix 2021 19:58)  T(F): 98.7 (16 Jun 2021 14:25), Max: 99.9 (15 Felix 2021 19:58)  HR: 57 (16 Jun 2021 14:25) (57 - 77)  BP: 85/48 (16 Jun 2021 14:25) (85/48 - 116/76)  BP(mean): --  RR: 15 (16 Jun 2021 14:25) (15 - 17)  SpO2: 94% (16 Jun 2021 14:25) (91% - 95%)  GEN: NAD  HEENT: normocephalic and atraumatic. EOMI. PERRL.    NECK: Supple.  No lymphadenopathy   LUNGS: Clear to auscultation.  HEART: Regular rate and rhythm without murmur.  ABDOMEN: Soft, and nondistended, tender in RLQ.  Positive bowel sounds.    : No CVA tenderness  EXTREMITIES: Without any cyanosis, clubbing, rash, lesions or edema.  NEUROLOGIC: grossly intact.  PSYCHIATRIC: Appropriate affect .  SKIN: No ulceration or induration present.    Labs:                        12.8   10.83 )-----------( 381      ( 16 Jun 2021 10:42 )             37.5     06-16    134<L>  |  101  |  5<L>  ----------------------------<  118<H>  3.8   |  27  |  0.59    Ca    7.9<L>      16 Jun 2021 10:42    TPro  6.7  /  Alb  2.1<L>  /  TBili  0.9  /  DBili  x   /  AST  30  /  ALT  37  /  AlkPhos  159<H>  06-15    Culture - Blood (collected 06-15-21 @ 09:05)  Source: .Blood Blood    Culture - Blood (collected 06-15-21 @ 09:05)  Source: .Blood Blood    Culture - Urine (collected 06-15-21 @ 04:08)  Source: .Urine Clean Catch (Midstream)  Final Report (06-16-21 @ 03:15):    <10,000 CFU/mL Normal Urogenital Kimi    WBC Count: 10.83 K/uL (06-16-21 @ 10:42)  WBC Count: 11.82 K/uL (06-15-21 @ 05:22)  WBC Count: 13.28 K/uL (06-14-21 @ 21:30)    Creatinine, Serum: 0.59 mg/dL (06-16-21 @ 10:42)  Creatinine, Serum: 0.59 mg/dL (06-15-21 @ 05:22)  Creatinine, Serum: 0.72 mg/dL (06-14-21 @ 21:30)    COVID-19 PCR: NotDetec (06-15-21 @ 02:17)    All imaging and other data have been reviewed.  < from: CT Abdomen and Pelvis w/ IV Cont (06.15.21 @ 01:30) >  EXAM:  CT ABDOMEN AND PELVIS IC                        PROCEDURE DATE:  06/15/2021    INTERPRETATION:  CLINICAL INFORMATION: 50 years old. Male. RLQ abdominal pain.  COMPARISON: None.  CONTRAST/COMPLICATIONS:  IV Contrast: Omnipaque 350  90 cc administered  10 cc discarded.  Oral Contrast: NONE  Complications: None reported at time of study completion  PROCEDURE:  CT of the Abdomen and Pelvis was performed.  Sagittal and coronal reformats were performed.  FINDINGS:  LOWER CHEST: Mild bibasilar atelectasis.  LIVER: Within normal limits.  BILE DUCTS: Normal caliber.  GALLBLADDER: Within normal limits.  SPLEEN: Within normal limits.  PANCREAS: Within normal limits.  ADRENALS: Within normal limits.  KIDNEYS/URETERS: Enhance symmetrically. No hydronephrosis.  BLADDER: Within normal limits.  REPRODUCTIVE ORGANS: Within normal limits.  BOWEL: No bowel obstruction. Appendix is is not definitively visualized. There is a large heterogeneous peripherally enhancing fluid collection in the right lower quadrant measure roughly 6.1 x 7.5 x 13.7 cm (AP, TR, CC), with adjacent foci of pneumoperitoneum.  PERITONEUM: No ascites.  VESSELS: Normal caliber abdominal aorta.  RETROPERITONEUM/LYMPH NODES: No lymphadenopathy.  ABDOMINAL WALL: Within normal limits.  BONES: Degenerative changes in the spine.  IMPRESSION:  Findings concerning for contained perforated appendicitis with a large heterogeneous abscess.    Assessment and Plan:   51 y/o man with no significant PMH was admitted at [facility] on 6/15 with generalized abdominal pain for one week.   CT showed perforated appendicitis.  Leukocytosis 13k-->10k  UA negative     Perforated appendicitis  - Blood culture x 2 NGTD, UC negative   - Continue zosyn 3.375gm q8h  - Surgery is following, for possible IR drainage today   - Possible repeat CT in near future  - Frequent abdominal exam    Will follow.    Chong Winslow MD  Division of Infectious Diseases   Cell [phone] between 8am and 6pm   After 6pm and weekends please call ID service at [phone].

## 2021-06-17 LAB
ANION GAP SERPL CALC-SCNC: 5 MMOL/L — SIGNIFICANT CHANGE UP (ref 5–17)
BUN SERPL-MCNC: 4 MG/DL — LOW (ref 7–23)
CALCIUM SERPL-MCNC: 8 MG/DL — LOW (ref 8.5–10.1)
CHLORIDE SERPL-SCNC: 108 MMOL/L — SIGNIFICANT CHANGE UP (ref 96–108)
CO2 SERPL-SCNC: 25 MMOL/L — SIGNIFICANT CHANGE UP (ref 22–31)
CREAT SERPL-MCNC: 0.49 MG/DL — LOW (ref 0.5–1.3)
GLUCOSE SERPL-MCNC: 118 MG/DL — HIGH (ref 70–99)
HCT VFR BLD CALC: 38.8 % — LOW (ref 39–50)
HGB BLD-MCNC: 13.2 G/DL — SIGNIFICANT CHANGE UP (ref 13–17)
MCHC RBC-ENTMCNC: 29 PG — SIGNIFICANT CHANGE UP (ref 27–34)
MCHC RBC-ENTMCNC: 34 GM/DL — SIGNIFICANT CHANGE UP (ref 32–36)
MCV RBC AUTO: 85.3 FL — SIGNIFICANT CHANGE UP (ref 80–100)
NRBC # BLD: 0 /100 WBCS — SIGNIFICANT CHANGE UP (ref 0–0)
PLATELET # BLD AUTO: 394 K/UL — SIGNIFICANT CHANGE UP (ref 150–400)
POTASSIUM SERPL-MCNC: 3.9 MMOL/L — SIGNIFICANT CHANGE UP (ref 3.5–5.3)
POTASSIUM SERPL-SCNC: 3.9 MMOL/L — SIGNIFICANT CHANGE UP (ref 3.5–5.3)
RBC # BLD: 4.55 M/UL — SIGNIFICANT CHANGE UP (ref 4.2–5.8)
RBC # FLD: 14.9 % — HIGH (ref 10.3–14.5)
SODIUM SERPL-SCNC: 138 MMOL/L — SIGNIFICANT CHANGE UP (ref 135–145)
WBC # BLD: 8.67 K/UL — SIGNIFICANT CHANGE UP (ref 3.8–10.5)
WBC # FLD AUTO: 8.67 K/UL — SIGNIFICANT CHANGE UP (ref 3.8–10.5)

## 2021-06-17 PROCEDURE — 99232 SBSQ HOSP IP/OBS MODERATE 35: CPT

## 2021-06-17 PROCEDURE — 99234 HOSP IP/OBS SM DT SF/LOW 45: CPT

## 2021-06-17 RX ORDER — ACETAMINOPHEN 500 MG
650 TABLET ORAL EVERY 6 HOURS
Refills: 0 | Status: DISCONTINUED | OUTPATIENT
Start: 2021-06-17 | End: 2021-06-21

## 2021-06-17 RX ADMIN — Medication 1000 MILLIGRAM(S): at 06:35

## 2021-06-17 RX ADMIN — PANTOPRAZOLE SODIUM 40 MILLIGRAM(S): 20 TABLET, DELAYED RELEASE ORAL at 05:28

## 2021-06-17 RX ADMIN — Medication 1000 MILLIGRAM(S): at 05:28

## 2021-06-17 RX ADMIN — Medication 1000 MILLIGRAM(S): at 00:25

## 2021-06-17 RX ADMIN — HEPARIN SODIUM 5000 UNIT(S): 5000 INJECTION INTRAVENOUS; SUBCUTANEOUS at 05:27

## 2021-06-17 RX ADMIN — HEPARIN SODIUM 5000 UNIT(S): 5000 INJECTION INTRAVENOUS; SUBCUTANEOUS at 13:17

## 2021-06-17 RX ADMIN — SERTRALINE 50 MILLIGRAM(S): 25 TABLET, FILM COATED ORAL at 11:13

## 2021-06-17 RX ADMIN — DEXTROSE MONOHYDRATE, SODIUM CHLORIDE, AND POTASSIUM CHLORIDE 125 MILLILITER(S): 50; .745; 4.5 INJECTION, SOLUTION INTRAVENOUS at 10:28

## 2021-06-17 RX ADMIN — PIPERACILLIN AND TAZOBACTAM 25 GRAM(S): 4; .5 INJECTION, POWDER, LYOPHILIZED, FOR SOLUTION INTRAVENOUS at 10:28

## 2021-06-17 RX ADMIN — Medication 1000 MILLIGRAM(S): at 11:13

## 2021-06-17 RX ADMIN — Medication 1000 MILLIGRAM(S): at 11:57

## 2021-06-17 RX ADMIN — PIPERACILLIN AND TAZOBACTAM 25 GRAM(S): 4; .5 INJECTION, POWDER, LYOPHILIZED, FOR SOLUTION INTRAVENOUS at 17:38

## 2021-06-17 RX ADMIN — DEXTROSE MONOHYDRATE, SODIUM CHLORIDE, AND POTASSIUM CHLORIDE 100 MILLILITER(S): 50; .745; 4.5 INJECTION, SOLUTION INTRAVENOUS at 16:02

## 2021-06-17 RX ADMIN — HEPARIN SODIUM 5000 UNIT(S): 5000 INJECTION INTRAVENOUS; SUBCUTANEOUS at 21:51

## 2021-06-17 RX ADMIN — SENNA PLUS 2 TABLET(S): 8.6 TABLET ORAL at 21:52

## 2021-06-17 RX ADMIN — DEXTROSE MONOHYDRATE, SODIUM CHLORIDE, AND POTASSIUM CHLORIDE 125 MILLILITER(S): 50; .745; 4.5 INJECTION, SOLUTION INTRAVENOUS at 01:13

## 2021-06-17 RX ADMIN — Medication 650 MILLIGRAM(S): at 23:26

## 2021-06-17 RX ADMIN — PIPERACILLIN AND TAZOBACTAM 25 GRAM(S): 4; .5 INJECTION, POWDER, LYOPHILIZED, FOR SOLUTION INTRAVENOUS at 01:13

## 2021-06-17 NOTE — PROGRESS NOTE ADULT - SUBJECTIVE AND OBJECTIVE BOX
Patient was seen lying comfortably in bed. No acute events overnight.  Pt admits to mild pain but states it is tolerable with meds  Is tolerating NPO well without any nausea or vomiting.  Voiding without difficulty. Pt has passed flatus, but denies BM  Pt is denying any chest pain, sob, dizziness, weakness       T(C): 36.8 (06-17-21 @ 04:30), Max: 37.1 (06-16-21 @ 11:57)  HR: 56 (06-17-21 @ 04:30) (55 - 66)  BP: 109/72 (06-17-21 @ 04:30) (85/48 - 109/72)  RR: 18 (06-17-21 @ 04:30) (15 - 18)  SpO2: 96% (06-17-21 @ 04:30) (92% - 96%)      06-16-21 @ 07:01  -  06-17-21 @ 07:00  --------------------------------------------------------  IN: 3200 mL / OUT: 50 mL / NET: 3150 mL    YOSHI 50cc purulent     PE  General: Pt is lying in bed, NAD, A+O x 3  Cardio: RRR  Lungs: NLB  Incision: YOSHI in place, CDI site. Purulent output   Abdomen: Belly is soft, distended, and mildly tender to palpation  Ext: Calves are soft and non tender to palpation b/l.  ( - ) edema        ASSESSMENT  50y Male with perforated appendicitis with abscess currently constipated awaiting IR drainage of abscess       PLAN  Antibiotics- as per ID  Respiration- IS use encouraged   Ambulation- OOB as tolerated  Diet- continue NPO, IVF  Pain meds PRN  serial abdominal exams   f/u AM labs   bowel regimen  YOSHI drain care

## 2021-06-17 NOTE — PROVIDER CONTACT NOTE (CRITICAL VALUE NOTIFICATION) - SITUATION
gram stain- numerous polymorphonuclear leukocytes seen per low power field, numerous gram variable rods per oil power field, numerous gram positive cocci in pair and chains seen per oil power field

## 2021-06-17 NOTE — PROVIDER CONTACT NOTE (CRITICAL VALUE NOTIFICATION) - TEST AND RESULT REPORTED:
body fluid collection from 6/16/21  gram stain- numerous polymorphic lymphocyte seen per low power field, numerous gram variable rods per oil power field, numerous gram positive cocci in pair and chains seen per oil power field body fluid collection from 6/16/21  gram stain- numerous polymorphonuclear leukocytes seen per low power field, numerous gram variable rods per oil power field, numerous gram positive cocci in pair and chains seen per oil power field

## 2021-06-17 NOTE — PROGRESS NOTE ADULT - SUBJECTIVE AND OBJECTIVE BOX
[facility]  INFECTIOUS DISEASES   [address] [place] [address]  Tel: [phone]     Fax: [phone]  =======================================================    MRN-[id number]  JEANE DOBLADILLANNALISA     Follow up; Appendicitis     Doing better, No fever. 70cc of purulent fluid was removed by IR.     PAST MEDICAL & SURGICAL HISTORY:  Depression  No significant past surgical history    Social Hx: no smoking, ETOH or drugs     FAMILY HISTORY:  No pertinent family history in first degree relatives    Allergies  No Known Allergies    Antibiotics:  piperacillin/tazobactam IVPB.. 3.375 Gram(s) IV Intermittent every 8 hours    REVIEW OF SYSTEMS:  CONSTITUTIONAL:  No Fever or chills  HEENT:  No diplopia or blurred vision.  No sore throat or runny nose.  CARDIOVASCULAR:  No chest pain or SOB.  RESPIRATORY:  No cough, shortness of breath, PND or orthopnea.  GASTROINTESTINAL:  No nausea, vomiting or diarrhea. + abdominal pain  GENITOURINARY:  No dysuria, frequency or urgency. No Blood in urine  MUSCULOSKELETAL:  no joint aches, no muscle pain  SKIN:  No change in skin, hair or nails.  NEUROLOGIC:  No paresthesias, fasciculations, seizures or weakness.  PSYCHIATRIC:  No disorder of thought or mood.  ENDOCRINE:  No heat or cold intolerance, polyuria or polydipsia.  HEMATOLOGICAL:  No easy bruising or bleeding.     Physical Exam:  Vital Signs Last 24 Hrs  T(C): 36.3 (17 Jun 2021 10:53), Max: 37 (16 Jun 2021 18:12)  T(F): 97.4 (17 Jun 2021 10:53), Max: 98.6 (16 Jun 2021 18:12)  HR: 63 (17 Jun 2021 10:53) (55 - 63)  BP: 102/71 (17 Jun 2021 10:53) (102/71 - 109/72)  BP(mean): --  RR: 18 (17 Jun 2021 10:53) (17 - 18)  SpO2: 97% (17 Jun 2021 10:53) (94% - 97%)  GEN: NAD  HEENT: normocephalic and atraumatic. EOMI. PERRL.    NECK: Supple.  No lymphadenopathy   LUNGS: Clear to auscultation.  HEART: Regular rate and rhythm without murmur.  ABDOMEN: Soft, and nondistended, tender in RLQ.  Positive bowel sounds.    : No CVA tenderness  EXTREMITIES: Without any cyanosis, clubbing, rash, lesions or edema.  NEUROLOGIC: grossly intact.  PSYCHIATRIC: Appropriate affect .  SKIN: No ulceration or induration present.    Labs:                        13.2   8.67  )-----------( 394      ( 17 Jun 2021 06:29 )             38.8     06-17    138  |  108  |  4<L>  ----------------------------<  118<H>  3.9   |  25  |  0.49<L>    Ca    8.0<L>      17 Jun 2021 06:29    Culture - Body Fluid with Gram Stain (collected 06-16-21 @ 22:01)  Source: .Body Fluid rlq fluid collection  Gram Stain (06-17-21 @ 02:57):    Numerous polymorphonuclear leukocytes seen per low power field    Numerous Gram Variable Rods seen per oil power field    Numerous Gram Positive Cocci in Pairs and Chains seen per oil power field    Culture - Blood (collected 06-15-21 @ 09:05)  Source: .Blood Blood    Culture - Blood (collected 06-15-21 @ 09:05)  Source: .Blood Blood    Culture - Urine (collected 06-15-21 @ 04:08)  Source: .Urine Clean Catch (Midstream)  Final Report (06-16-21 @ 03:15):    <10,000 CFU/mL Normal Urogenital Kimi    WBC Count: 8.67 K/uL (06-17-21 @ 06:29)  WBC Count: 10.83 K/uL (06-16-21 @ 10:42)  WBC Count: 11.82 K/uL (06-15-21 @ 05:22)  WBC Count: 13.28 K/uL (06-14-21 @ 21:30)    Creatinine, Serum: 0.49 mg/dL (06-17-21 @ 06:29)  Creatinine, Serum: 0.59 mg/dL (06-16-21 @ 10:42)  Creatinine, Serum: 0.59 mg/dL (06-15-21 @ 05:22)  Creatinine, Serum: 0.72 mg/dL (06-14-21 @ 21:30)    COVID-19 PCR: NotDetec (06-15-21 @ 02:17)    All imaging and other data have been reviewed.  < from: CT Abdomen and Pelvis w/ IV Cont (06.15.21 @ 01:30) >  EXAM:  CT ABDOMEN AND PELVIS IC                        PROCEDURE DATE:  06/15/2021    INTERPRETATION:  CLINICAL INFORMATION: 50 years old. Male. RLQ abdominal pain.  COMPARISON: None.  CONTRAST/COMPLICATIONS:  IV Contrast: Omnipaque 350  90 cc administered  10 cc discarded.  Oral Contrast: NONE  Complications: None reported at time of study completion  PROCEDURE:  CT of the Abdomen and Pelvis was performed.  Sagittal and coronal reformats were performed.  FINDINGS:  LOWER CHEST: Mild bibasilar atelectasis.  LIVER: Within normal limits.  BILE DUCTS: Normal caliber.  GALLBLADDER: Within normal limits.  SPLEEN: Within normal limits.  PANCREAS: Within normal limits.  ADRENALS: Within normal limits.  KIDNEYS/URETERS: Enhance symmetrically. No hydronephrosis.  BLADDER: Within normal limits.  REPRODUCTIVE ORGANS: Within normal limits.  BOWEL: No bowel obstruction. Appendix is is not definitively visualized. There is a large heterogeneous peripherally enhancing fluid collection in the right lower quadrant measure roughly 6.1 x 7.5 x 13.7 cm (AP, TR, CC), with adjacent foci of pneumoperitoneum.  PERITONEUM: No ascites.  VESSELS: Normal caliber abdominal aorta.  RETROPERITONEUM/LYMPH NODES: No lymphadenopathy.  ABDOMINAL WALL: Within normal limits.  BONES: Degenerative changes in the spine.  IMPRESSION:  Findings concerning for contained perforated appendicitis with a large heterogeneous abscess.    Assessment and Plan:   49 y/o man with no significant PMH was admitted at [facility] on 6/15 with generalized abdominal pain for one week.   CT showed perforated appendicitis.  Leukocytosis 13k-->8k  UA and UC negative   Blood culture x 2 NGTD,  s/p IR drainage 70cc purulent fluid    Perforated appendicitis  - Fluid culture with GPC and gram variable rods, will follow   - Continue zosyn 3.375gm q8h  - Surgery is following  - Possible repeat CT in near future  - When surgically ready for discharge can switch to oral ABx    Will follow.    Chong Winslow MD  Division of Infectious Diseases   Cell [phone] between 8am and 6pm   After 6pm and weekends please call ID service at [phone].

## 2021-06-18 LAB
ANION GAP SERPL CALC-SCNC: 6 MMOL/L — SIGNIFICANT CHANGE UP (ref 5–17)
BUN SERPL-MCNC: 2 MG/DL — LOW (ref 7–23)
CALCIUM SERPL-MCNC: 8.1 MG/DL — LOW (ref 8.5–10.1)
CHLORIDE SERPL-SCNC: 106 MMOL/L — SIGNIFICANT CHANGE UP (ref 96–108)
CO2 SERPL-SCNC: 26 MMOL/L — SIGNIFICANT CHANGE UP (ref 22–31)
CREAT SERPL-MCNC: 0.53 MG/DL — SIGNIFICANT CHANGE UP (ref 0.5–1.3)
GLUCOSE SERPL-MCNC: 103 MG/DL — HIGH (ref 70–99)
HCT VFR BLD CALC: 38 % — LOW (ref 39–50)
HGB BLD-MCNC: 13 G/DL — SIGNIFICANT CHANGE UP (ref 13–17)
MCHC RBC-ENTMCNC: 28.9 PG — SIGNIFICANT CHANGE UP (ref 27–34)
MCHC RBC-ENTMCNC: 34.2 GM/DL — SIGNIFICANT CHANGE UP (ref 32–36)
MCV RBC AUTO: 84.4 FL — SIGNIFICANT CHANGE UP (ref 80–100)
NRBC # BLD: 0 /100 WBCS — SIGNIFICANT CHANGE UP (ref 0–0)
PLATELET # BLD AUTO: 439 K/UL — HIGH (ref 150–400)
POTASSIUM SERPL-MCNC: 3.7 MMOL/L — SIGNIFICANT CHANGE UP (ref 3.5–5.3)
POTASSIUM SERPL-SCNC: 3.7 MMOL/L — SIGNIFICANT CHANGE UP (ref 3.5–5.3)
RBC # BLD: 4.5 M/UL — SIGNIFICANT CHANGE UP (ref 4.2–5.8)
RBC # FLD: 14.9 % — HIGH (ref 10.3–14.5)
SODIUM SERPL-SCNC: 138 MMOL/L — SIGNIFICANT CHANGE UP (ref 135–145)
WBC # BLD: 9.42 K/UL — SIGNIFICANT CHANGE UP (ref 3.8–10.5)
WBC # FLD AUTO: 9.42 K/UL — SIGNIFICANT CHANGE UP (ref 3.8–10.5)

## 2021-06-18 PROCEDURE — 99232 SBSQ HOSP IP/OBS MODERATE 35: CPT

## 2021-06-18 PROCEDURE — 99234 HOSP IP/OBS SM DT SF/LOW 45: CPT

## 2021-06-18 RX ADMIN — SENNA PLUS 2 TABLET(S): 8.6 TABLET ORAL at 21:39

## 2021-06-18 RX ADMIN — SERTRALINE 50 MILLIGRAM(S): 25 TABLET, FILM COATED ORAL at 11:50

## 2021-06-18 RX ADMIN — HEPARIN SODIUM 5000 UNIT(S): 5000 INJECTION INTRAVENOUS; SUBCUTANEOUS at 21:39

## 2021-06-18 RX ADMIN — HEPARIN SODIUM 5000 UNIT(S): 5000 INJECTION INTRAVENOUS; SUBCUTANEOUS at 05:22

## 2021-06-18 RX ADMIN — PANTOPRAZOLE SODIUM 40 MILLIGRAM(S): 20 TABLET, DELAYED RELEASE ORAL at 05:22

## 2021-06-18 RX ADMIN — PIPERACILLIN AND TAZOBACTAM 25 GRAM(S): 4; .5 INJECTION, POWDER, LYOPHILIZED, FOR SOLUTION INTRAVENOUS at 01:53

## 2021-06-18 RX ADMIN — Medication 650 MILLIGRAM(S): at 00:20

## 2021-06-18 RX ADMIN — HEPARIN SODIUM 5000 UNIT(S): 5000 INJECTION INTRAVENOUS; SUBCUTANEOUS at 15:04

## 2021-06-18 RX ADMIN — DEXTROSE MONOHYDRATE, SODIUM CHLORIDE, AND POTASSIUM CHLORIDE 100 MILLILITER(S): 50; .745; 4.5 INJECTION, SOLUTION INTRAVENOUS at 05:22

## 2021-06-18 RX ADMIN — PIPERACILLIN AND TAZOBACTAM 25 GRAM(S): 4; .5 INJECTION, POWDER, LYOPHILIZED, FOR SOLUTION INTRAVENOUS at 09:57

## 2021-06-18 RX ADMIN — PIPERACILLIN AND TAZOBACTAM 25 GRAM(S): 4; .5 INJECTION, POWDER, LYOPHILIZED, FOR SOLUTION INTRAVENOUS at 18:42

## 2021-06-18 NOTE — PROGRESS NOTE ADULT - ATTENDING COMMENTS
Patient seen and examined with surgical PA.  He states that he is feeling better after having a bowel movement.  He denies abdominal pain or nausea and states that he is hungry.  His abdomen is soft and non-tender.  The drain is putting out a copious amount of tan-colored purulence.  His leukocytosis has revolved.  Will advance to clear liquids.  Continue antibiotics per ID recommendations.
Patient seen and examined and case d/w his brother over the phone.  He states that he continues to feel well and tolerated clear liquids.  His abdomen remains soft and non-tender.  The drain output remains tan-colored purulence.  Will advance to low residue diet.  He will need VNS for drain care arranged for discharge.  Follow-up ID regarding antibiotic duration and regimen.  Discharge planning once he is tolerating a diet, he has been cleared by ID, and VNS has been arranged for drain care.  He will need outpatient general surgery follow-up as well as to follow-up with IR for an outpatient drain study.

## 2021-06-18 NOTE — PROGRESS NOTE ADULT - ASSESSMENT
51 yo male with perf appendicitis, s/p IR drainage  -advance to LRD  -ABx as per ID   -OOB ambulate  -discussed with Dr. Calvillo     Sx [phone]

## 2021-06-18 NOTE — PROGRESS NOTE ADULT - SUBJECTIVE AND OBJECTIVE BOX
[facility]  INFECTIOUS DISEASES   [address] [place] [address]  Tel: [phone]     Fax: [phone]  =======================================================    MRN-[id number]  JEANE DOBLADILLO     Follow up; Appendicitis     Doing better, No fever. 70cc of purulent fluid was removed by IR.   Feels better, less pain.      PAST MEDICAL & SURGICAL HISTORY:  Depression  No significant past surgical history    Social Hx: no smoking, ETOH or drugs     FAMILY HISTORY:  No pertinent family history in first degree relatives    Allergies  No Known Allergies    Antibiotics:  piperacillin/tazobactam IVPB.. 3.375 Gram(s) IV Intermittent every 8 hours    REVIEW OF SYSTEMS:  CONSTITUTIONAL:  No Fever or chills  HEENT:  No diplopia or blurred vision.  No sore throat or runny nose.  CARDIOVASCULAR:  No chest pain or SOB.  RESPIRATORY:  No cough, shortness of breath, PND or orthopnea.  GASTROINTESTINAL:  No nausea, vomiting or diarrhea. + abdominal pain  GENITOURINARY:  No dysuria, frequency or urgency. No Blood in urine  MUSCULOSKELETAL:  no joint aches, no muscle pain  SKIN:  No change in skin, hair or nails.  NEUROLOGIC:  No paresthesias, fasciculations, seizures or weakness.  PSYCHIATRIC:  No disorder of thought or mood.  ENDOCRINE:  No heat or cold intolerance, polyuria or polydipsia.  HEMATOLOGICAL:  No easy bruising or bleeding.     Physical Exam:  Vital Signs Last 24 Hrs  T(C): 36.3 (18 Jun 2021 13:41), Max: 37 (17 Jun 2021 21:07)  T(F): 97.4 (18 Jun 2021 13:41), Max: 98.6 (17 Jun 2021 21:07)  HR: 76 (18 Jun 2021 13:41) (64 - 84)  BP: 106/64 (18 Jun 2021 13:41) (101/62 - 106/64)  BP(mean): --  RR: 18 (18 Jun 2021 13:41) (18 - 18)  SpO2: 94% (18 Jun 2021 13:41) (94% - 99%)  GEN: NAD  HEENT: normocephalic and atraumatic. EOMI. PERRL.    NECK: Supple.  No lymphadenopathy   LUNGS: Clear to auscultation.  HEART: Regular rate and rhythm without murmur.  ABDOMEN: Soft, and nondistended, tender in RLQ.  Positive bowel sounds. drain in place    : No CVA tenderness  EXTREMITIES: Without any cyanosis, clubbing, rash, lesions or edema.  NEUROLOGIC: grossly intact.  PSYCHIATRIC: Appropriate affect .  SKIN: No ulceration or induration present.      Labs:                        13.0   9.42  )-----------( 439      ( 18 Jun 2021 07:11 )             38.0     06-18    138  |  106  |  2<L>  ----------------------------<  103<H>  3.7   |  26  |  0.53    Ca    8.1<L>      18 Jun 2021 07:11    Culture - Body Fluid with Gram Stain (collected 06-16-21 @ 22:01)  Source: .Body Fluid rlq fluid collection  Gram Stain (06-17-21 @ 02:57):    Numerous polymorphonuclear leukocytes seen per low power field    Numerous Gram Variable Rods seen per oil power field    Numerous Gram Positive Cocci in Pairs and Chains seen per oil power field    Culture - Blood (collected 06-15-21 @ 09:05)  Source: .Blood Blood    Culture - Blood (collected 06-15-21 @ 09:05)  Source: .Blood Blood    Culture - Urine (collected 06-15-21 @ 04:08)  Source: .Urine Clean Catch (Midstream)  Final Report (06-16-21 @ 03:15):    <10,000 CFU/mL Normal Urogenital Benita    WBC Count: 9.42 K/uL (06-18-21 @ 07:11)  WBC Count: 8.67 K/uL (06-17-21 @ 06:29)  WBC Count: 10.83 K/uL (06-16-21 @ 10:42)  WBC Count: 11.82 K/uL (06-15-21 @ 05:22)  WBC Count: 13.28 K/uL (06-14-21 @ 21:30)    Creatinine, Serum: 0.53 mg/dL (06-18-21 @ 07:11)  Creatinine, Serum: 0.49 mg/dL (06-17-21 @ 06:29)  Creatinine, Serum: 0.59 mg/dL (06-16-21 @ 10:42)  Creatinine, Serum: 0.59 mg/dL (06-15-21 @ 05:22)  Creatinine, Serum: 0.72 mg/dL (06-14-21 @ 21:30)    COVID-19 PCR: NotDetec (06-15-21 @ 02:17)    All imaging and other data have been reviewed.  < from: CT Abdomen and Pelvis w/ IV Cont (06.15.21 @ 01:30) >  EXAM:  CT ABDOMEN AND PELVIS IC                        PROCEDURE DATE:  06/15/2021    INTERPRETATION:  CLINICAL INFORMATION: 50 years old. Male. RLQ abdominal pain.  COMPARISON: None.  CONTRAST/COMPLICATIONS:  IV Contrast: Omnipaque 350  90 cc administered  10 cc discarded.  Oral Contrast: NONE  Complications: None reported at time of study completion  PROCEDURE:  CT of the Abdomen and Pelvis was performed.  Sagittal and coronal reformats were performed.  FINDINGS:  LOWER CHEST: Mild bibasilar atelectasis.  LIVER: Within normal limits.  BILE DUCTS: Normal caliber.  GALLBLADDER: Within normal limits.  SPLEEN: Within normal limits.  PANCREAS: Within normal limits.  ADRENALS: Within normal limits.  KIDNEYS/URETERS: Enhance symmetrically. No hydronephrosis.  BLADDER: Within normal limits.  REPRODUCTIVE ORGANS: Within normal limits.  BOWEL: No bowel obstruction. Appendix is is not definitively visualized. There is a large heterogeneous peripherally enhancing fluid collection in the right lower quadrant measure roughly 6.1 x 7.5 x 13.7 cm (AP, TR, CC), with adjacent foci of pneumoperitoneum.  PERITONEUM: No ascites.  VESSELS: Normal caliber abdominal aorta.  RETROPERITONEUM/LYMPH NODES: No lymphadenopathy.  ABDOMINAL WALL: Within normal limits.  BONES: Degenerative changes in the spine.  IMPRESSION:  Findings concerning for contained perforated appendicitis with a large heterogeneous abscess.    Assessment and Plan:   49 y/o man with no significant PMH was [facility] on [facility]15 with generalized abdominal pain for one week.   CT showed perforated appendicitis.  Leukocytosis 13k-->8k  UA and UC negative   Blood culture x 2 NGTD,  s/p IR drainage 70cc purulent fluid    Perforated appendicitis  - Fluid culture with mixed benita, GPC and gram variable rods, will follow   - Continue zosyn 3.375gm q8h  - Surgery is following, advanced to low residue diet  - Possible repeat CT in near future   - When ready for discharge can switch to oral ABx (ciprofloxacin 500mg q12 or Vantin 200mg q12 + Flagyl 500mg q8h) at least 2 weeks.     Will follow PRN.    Chong Winslow MD  Division of Infectious Diseases   Cell [phone] between 8am and 6pm   After 6pm and weekends please call ID service at [phone].

## 2021-06-18 NOTE — PROGRESS NOTE ADULT - SUBJECTIVE AND OBJECTIVE BOX
Surgery Progress Note     Subjective: no abdominal pain, no n/v, tolerating clears, passing flatus/ BM     PE: NAD   abd soft nontender drain pus 150ml/24 hrs     Vital Signs Last 24 Hrs  T(C): 37 (2021 04:23), Max: 37 (2021 21:07)  T(F): 98.6 (2021 04:23), Max: 98.6 (2021 21:07)  HR: 84 (2021 04:23) (63 - 84)  BP: 101/62 (2021 04:23) (101/62 - 105/70)  BP(mean): --  RR: 18 (2021 04:23) (18 - 18)  SpO2: 96% (2021 04:23) (96% - 99%)    I&O's Detail    2021 07:01  -  2021 07:00  --------------------------------------------------------  IN:    dextrose 5% + sodium chloride 0.9% w/ Additives: 2775 mL    IV PiggyBack: 200 mL    Oral Fluid: 480 mL  Total IN: 3455 mL    OUT:    Drain (mL): 150 mL    Voided (mL): 1350 mL  Total OUT: 1500 mL    Total NET: 1955 mL          Daily     Daily Weight in k.6 (2021 04:23)    MEDICATIONS  (STANDING):  heparin   Injectable 5000 Unit(s) SubCutaneous every 8 hours  pantoprazole    Tablet 40 milliGRAM(s) Oral before breakfast  piperacillin/tazobactam IVPB.. 3.375 Gram(s) IV Intermittent every 8 hours  senna 2 Tablet(s) Oral at bedtime  sertraline 50 milliGRAM(s) Oral daily    MEDICATIONS  (PRN):  acetaminophen   Tablet .. 650 milliGRAM(s) Oral every 6 hours PRN Mild Pain (1 - 3)      LABS:                        13.0   9.42  )-----------( 439      ( 2021 07:11 )             38.0     06-18    138  |  106  |  2<L>  ----------------------------<  103<H>  3.7   |  26  |  0.53    Ca    8.1<L>      2021 07:11

## 2021-06-19 LAB
ANION GAP SERPL CALC-SCNC: 9 MMOL/L — SIGNIFICANT CHANGE UP (ref 5–17)
BUN SERPL-MCNC: 6 MG/DL — LOW (ref 7–23)
CALCIUM SERPL-MCNC: 8.5 MG/DL — SIGNIFICANT CHANGE UP (ref 8.5–10.1)
CHLORIDE SERPL-SCNC: 103 MMOL/L — SIGNIFICANT CHANGE UP (ref 96–108)
CO2 SERPL-SCNC: 24 MMOL/L — SIGNIFICANT CHANGE UP (ref 22–31)
CREAT SERPL-MCNC: 0.67 MG/DL — SIGNIFICANT CHANGE UP (ref 0.5–1.3)
GLUCOSE SERPL-MCNC: 92 MG/DL — SIGNIFICANT CHANGE UP (ref 70–99)
HCT VFR BLD CALC: 38.5 % — LOW (ref 39–50)
HGB BLD-MCNC: 13.2 G/DL — SIGNIFICANT CHANGE UP (ref 13–17)
MAGNESIUM SERPL-MCNC: 2.4 MG/DL — SIGNIFICANT CHANGE UP (ref 1.6–2.6)
MCHC RBC-ENTMCNC: 29.3 PG — SIGNIFICANT CHANGE UP (ref 27–34)
MCHC RBC-ENTMCNC: 34.3 GM/DL — SIGNIFICANT CHANGE UP (ref 32–36)
MCV RBC AUTO: 85.4 FL — SIGNIFICANT CHANGE UP (ref 80–100)
NRBC # BLD: 0 /100 WBCS — SIGNIFICANT CHANGE UP (ref 0–0)
PHOSPHATE SERPL-MCNC: 3.2 MG/DL — SIGNIFICANT CHANGE UP (ref 2.5–4.5)
PLATELET # BLD AUTO: 486 K/UL — HIGH (ref 150–400)
POTASSIUM SERPL-MCNC: 3.7 MMOL/L — SIGNIFICANT CHANGE UP (ref 3.5–5.3)
POTASSIUM SERPL-SCNC: 3.7 MMOL/L — SIGNIFICANT CHANGE UP (ref 3.5–5.3)
RBC # BLD: 4.51 M/UL — SIGNIFICANT CHANGE UP (ref 4.2–5.8)
RBC # FLD: 14.6 % — HIGH (ref 10.3–14.5)
SODIUM SERPL-SCNC: 136 MMOL/L — SIGNIFICANT CHANGE UP (ref 135–145)
WBC # BLD: 8.17 K/UL — SIGNIFICANT CHANGE UP (ref 3.8–10.5)
WBC # FLD AUTO: 8.17 K/UL — SIGNIFICANT CHANGE UP (ref 3.8–10.5)

## 2021-06-19 RX ADMIN — PIPERACILLIN AND TAZOBACTAM 25 GRAM(S): 4; .5 INJECTION, POWDER, LYOPHILIZED, FOR SOLUTION INTRAVENOUS at 09:24

## 2021-06-19 RX ADMIN — PIPERACILLIN AND TAZOBACTAM 25 GRAM(S): 4; .5 INJECTION, POWDER, LYOPHILIZED, FOR SOLUTION INTRAVENOUS at 02:19

## 2021-06-19 RX ADMIN — PIPERACILLIN AND TAZOBACTAM 25 GRAM(S): 4; .5 INJECTION, POWDER, LYOPHILIZED, FOR SOLUTION INTRAVENOUS at 17:35

## 2021-06-19 RX ADMIN — HEPARIN SODIUM 5000 UNIT(S): 5000 INJECTION INTRAVENOUS; SUBCUTANEOUS at 13:26

## 2021-06-19 RX ADMIN — SENNA PLUS 2 TABLET(S): 8.6 TABLET ORAL at 22:47

## 2021-06-19 RX ADMIN — SERTRALINE 50 MILLIGRAM(S): 25 TABLET, FILM COATED ORAL at 11:18

## 2021-06-19 RX ADMIN — HEPARIN SODIUM 5000 UNIT(S): 5000 INJECTION INTRAVENOUS; SUBCUTANEOUS at 06:43

## 2021-06-19 RX ADMIN — HEPARIN SODIUM 5000 UNIT(S): 5000 INJECTION INTRAVENOUS; SUBCUTANEOUS at 22:47

## 2021-06-19 RX ADMIN — PANTOPRAZOLE SODIUM 40 MILLIGRAM(S): 20 TABLET, DELAYED RELEASE ORAL at 06:44

## 2021-06-19 NOTE — PROGRESS NOTE ADULT - SUBJECTIVE AND OBJECTIVE BOX
JEANE HASTINGS  MRN-[id number] 50y    GENERAL SURGERY    NO FEVER, CHILLS, N/V  TOLERATING REGULAR DIET  + BM, VOIDING     MEDICATIONS  (STANDING):  heparin   Injectable 5000 Unit(s) SubCutaneous every 8 hours  pantoprazole    Tablet 40 milliGRAM(s) Oral before breakfast  piperacillin/tazobactam IVPB.. 3.375 Gram(s) IV Intermittent every 8 hours  senna 2 Tablet(s) Oral at bedtime  sertraline 50 milliGRAM(s) Oral daily    MEDICATIONS  (PRN):  acetaminophen   Tablet .. 650 milliGRAM(s) Oral every 6 hours PRN Mild Pain (1 - 3)      Vital Signs Last 24 Hrs  T(C): 36.8 (19 Jun 2021 11:54), Max: 36.9 (18 Jun 2021 20:17)  T(F): 98.2 (19 Jun 2021 11:54), Max: 98.5 (18 Jun 2021 20:17)  HR: 74 (19 Jun 2021 11:54) (72 - 76)  BP: 102/67 (19 Jun 2021 11:54) (98/65 - 106/64)  RR: 18 (19 Jun 2021 11:54) (18 - 18)  SpO2: 95% (19 Jun 2021 11:54) (94% - 95%)      06-18-21 @ 07:01  -  06-19-21 @ 07:00  --------------------------------------------------------  IN: 300 mL / OUT: 35 mL / NET: 265 mL    RIGHT IR DRAIN 35 ML PUS       LUNGS: CLEAR TO AUSCULTATION , NO W/R/R  ABDOMEN: RIGHT FLANK IR CATH IN PLACE, PUS IN THE DRAIN BULB NOTED, + BS, SOFT, SOFTLY DISTENDED, NON TENDER TO PALPATION   EXTREMITY: NO EDEMA, NO CALF TENDERNESS                            13.2   8.17  )-----------( 486      ( 19 Jun 2021 09:21 )             38.5      06-19    136  |  103  |  6<L>  ----------------------------<  92  3.7   |  24  |  0.67    Ca    8.5      19 Jun 2021 09:21  Phos  3.2     06-19  Mg     2.4     06-19                           ASSESSMENT &  PLAN:     PERFORATED APPENDICITIS WITH MULTIPLE INTRA-ABDOMINAL ABSCESS/ COLLECTION    S/P IR DRAINAGE 06/16    LOW FIBER DIET  ID FOLLOW UP NOTED, CONTINUE ZOSYN  MAINTAIN AND MONITOR IR DRAINAGE  HOME CARE CONSULT FOR DRAIN CARE   D/C PLAN

## 2021-06-20 RX ADMIN — PANTOPRAZOLE SODIUM 40 MILLIGRAM(S): 20 TABLET, DELAYED RELEASE ORAL at 06:31

## 2021-06-20 RX ADMIN — HEPARIN SODIUM 5000 UNIT(S): 5000 INJECTION INTRAVENOUS; SUBCUTANEOUS at 13:15

## 2021-06-20 RX ADMIN — PIPERACILLIN AND TAZOBACTAM 25 GRAM(S): 4; .5 INJECTION, POWDER, LYOPHILIZED, FOR SOLUTION INTRAVENOUS at 10:10

## 2021-06-20 RX ADMIN — HEPARIN SODIUM 5000 UNIT(S): 5000 INJECTION INTRAVENOUS; SUBCUTANEOUS at 22:23

## 2021-06-20 RX ADMIN — HEPARIN SODIUM 5000 UNIT(S): 5000 INJECTION INTRAVENOUS; SUBCUTANEOUS at 06:31

## 2021-06-20 RX ADMIN — SENNA PLUS 2 TABLET(S): 8.6 TABLET ORAL at 22:23

## 2021-06-20 RX ADMIN — SERTRALINE 50 MILLIGRAM(S): 25 TABLET, FILM COATED ORAL at 11:24

## 2021-06-20 RX ADMIN — PIPERACILLIN AND TAZOBACTAM 25 GRAM(S): 4; .5 INJECTION, POWDER, LYOPHILIZED, FOR SOLUTION INTRAVENOUS at 02:01

## 2021-06-20 RX ADMIN — PIPERACILLIN AND TAZOBACTAM 25 GRAM(S): 4; .5 INJECTION, POWDER, LYOPHILIZED, FOR SOLUTION INTRAVENOUS at 17:01

## 2021-06-20 NOTE — PROGRESS NOTE ADULT - SUBJECTIVE AND OBJECTIVE BOX
JEANE HASTINGS  MRN-[id number] 50y    GENERAL SURGERY    NO FEVER, CHILLS, N/V  TOLERATING LOW RESIDUE DIET  NO ABDOMINAL PAIN/ CRAMPS  + FLATUS AND BM, VOIDING     MEDICATIONS  (STANDING):  heparin   Injectable 5000 Unit(s) SubCutaneous every 8 hours  pantoprazole    Tablet 40 milliGRAM(s) Oral before breakfast  piperacillin/tazobactam IVPB.. 3.375 Gram(s) IV Intermittent every 8 hours  senna 2 Tablet(s) Oral at bedtime  sertraline 50 milliGRAM(s) Oral daily    MEDICATIONS  (PRN):  acetaminophen   Tablet .. 650 milliGRAM(s) Oral every 6 hours PRN Mild Pain (1 - 3)      Vital Signs Last 24 Hrs  T(C): 36.8 (20 Jun 2021 04:35), Max: 36.8 (19 Jun 2021 11:54)  T(F): 98.2 (20 Jun 2021 04:35), Max: 98.3 (19 Jun 2021 19:37)  HR: 71 (20 Jun 2021 04:35) (71 - 82)  BP: 106/65 (20 Jun 2021 04:35) (101/66 - 106/65)  RR: 18 (20 Jun 2021 04:35) (18 - 18)  SpO2: 92% (20 Jun 2021 04:35) (92% - 95%)      06-19-21 @ 07:01  -  06-20-21 @ 07:00  --------------------------------------------------------  IN: 0 mL / OUT: 55 mL / NET: -55 mL    RIGHT IR DRAIN 55 ML MOOK PUS     LUNGS: CLEAR TO AUSCULTATION , NO W/R/R  ABDOMEN: RIGHT FLANK IR CATH IN PLACE, PUS IN THE DRAIN BULB NOTED, SMALL NO TENDER REDUCIBLE UMBILICAL HERNIA, + BS, SOFT,  NON TENDER TO PALPATION   EXTREMITY: NO EDEMA, NO CALF TENDERNESS                            13.2   8.17  )-----------( 486      ( 19 Jun 2021 09:21 )             38.5      06-19    136  |  103  |  6<L>  ----------------------------<  92  3.7   |  24  |  0.67    Ca    8.5      19 Jun 2021 09:21  Phos  3.2     06-19  Mg     2.4     06-19                           ASSESSMENT &  PLAN:     PERFORATED APPENDICITIS WITH MULTIPLE INTRA-ABDOMINAL ABSCESS/ COLLECTION    S/P IR DRAINAGE 06/16    LOW FIBER DIET  CONTINUE ZOSYN AS PER ID  MAINTAIN AND MONITOR IR DRAINAGE  HOME CARE CONSULT FOR DRAIN CARE ORDERED    D/C PLAN WITH VISITING NURSE SERVICES ON PO ANTIBIOTICS WITH GENERAL SURGERY AND TUBE STUDY FOLLOW UP AFTER DISCHARGE

## 2021-06-21 ENCOUNTER — TRANSCRIPTION ENCOUNTER (OUTPATIENT)
Age: 51
End: 2021-06-21

## 2021-06-21 VITALS
TEMPERATURE: 98 F | HEART RATE: 68 BPM | OXYGEN SATURATION: 92 % | DIASTOLIC BLOOD PRESSURE: 75 MMHG | SYSTOLIC BLOOD PRESSURE: 126 MMHG | RESPIRATION RATE: 18 BRPM

## 2021-06-21 PROCEDURE — C1769: CPT

## 2021-06-21 PROCEDURE — 87077 CULTURE AEROBIC IDENTIFY: CPT

## 2021-06-21 PROCEDURE — 83605 ASSAY OF LACTIC ACID: CPT

## 2021-06-21 PROCEDURE — 87635 SARS-COV-2 COVID-19 AMP PRB: CPT

## 2021-06-21 PROCEDURE — 86769 SARS-COV-2 COVID-19 ANTIBODY: CPT

## 2021-06-21 PROCEDURE — 85025 COMPLETE CBC W/AUTO DIFF WBC: CPT

## 2021-06-21 PROCEDURE — 86900 BLOOD TYPING SEROLOGIC ABO: CPT

## 2021-06-21 PROCEDURE — 87184 SC STD DISK METHOD PER PLATE: CPT

## 2021-06-21 PROCEDURE — 99285 EMERGENCY DEPT VISIT HI MDM: CPT

## 2021-06-21 PROCEDURE — 86850 RBC ANTIBODY SCREEN: CPT

## 2021-06-21 PROCEDURE — 74019 RADEX ABDOMEN 2 VIEWS: CPT

## 2021-06-21 PROCEDURE — 81001 URINALYSIS AUTO W/SCOPE: CPT

## 2021-06-21 PROCEDURE — 86901 BLOOD TYPING SEROLOGIC RH(D): CPT

## 2021-06-21 PROCEDURE — 87075 CULTR BACTERIA EXCEPT BLOOD: CPT

## 2021-06-21 PROCEDURE — 80053 COMPREHEN METABOLIC PANEL: CPT

## 2021-06-21 PROCEDURE — 80048 BASIC METABOLIC PNL TOTAL CA: CPT

## 2021-06-21 PROCEDURE — 85610 PROTHROMBIN TIME: CPT

## 2021-06-21 PROCEDURE — 83690 ASSAY OF LIPASE: CPT

## 2021-06-21 PROCEDURE — 87040 BLOOD CULTURE FOR BACTERIA: CPT

## 2021-06-21 PROCEDURE — 82272 OCCULT BLD FECES 1-3 TESTS: CPT

## 2021-06-21 PROCEDURE — 10160 PNXR ASPIR ABSC HMTMA BULLA: CPT

## 2021-06-21 PROCEDURE — 99232 SBSQ HOSP IP/OBS MODERATE 35: CPT

## 2021-06-21 PROCEDURE — 84100 ASSAY OF PHOSPHORUS: CPT

## 2021-06-21 PROCEDURE — 87086 URINE CULTURE/COLONY COUNT: CPT

## 2021-06-21 PROCEDURE — 87205 SMEAR GRAM STAIN: CPT

## 2021-06-21 PROCEDURE — 87186 SC STD MICRODIL/AGAR DIL: CPT

## 2021-06-21 PROCEDURE — 36415 COLL VENOUS BLD VENIPUNCTURE: CPT

## 2021-06-21 PROCEDURE — 74177 CT ABD & PELVIS W/CONTRAST: CPT

## 2021-06-21 PROCEDURE — 85730 THROMBOPLASTIN TIME PARTIAL: CPT

## 2021-06-21 PROCEDURE — 85027 COMPLETE CBC AUTOMATED: CPT

## 2021-06-21 PROCEDURE — 83735 ASSAY OF MAGNESIUM: CPT

## 2021-06-21 PROCEDURE — 77012 CT SCAN FOR NEEDLE BIOPSY: CPT

## 2021-06-21 PROCEDURE — 87181 SC STD AGAR DILUTION PER AGT: CPT

## 2021-06-21 PROCEDURE — 93005 ELECTROCARDIOGRAM TRACING: CPT

## 2021-06-21 PROCEDURE — 87070 CULTURE OTHR SPECIMN AEROBIC: CPT

## 2021-06-21 RX ORDER — METRONIDAZOLE 500 MG
1 TABLET ORAL
Qty: 42 | Refills: 0
Start: 2021-06-21 | End: 2021-07-04

## 2021-06-21 RX ORDER — CEFPODOXIME PROXETIL 100 MG
1 TABLET ORAL
Qty: 28 | Refills: 0
Start: 2021-06-21 | End: 2021-07-04

## 2021-06-21 RX ADMIN — PANTOPRAZOLE SODIUM 40 MILLIGRAM(S): 20 TABLET, DELAYED RELEASE ORAL at 06:25

## 2021-06-21 RX ADMIN — SERTRALINE 50 MILLIGRAM(S): 25 TABLET, FILM COATED ORAL at 11:17

## 2021-06-21 RX ADMIN — PIPERACILLIN AND TAZOBACTAM 25 GRAM(S): 4; .5 INJECTION, POWDER, LYOPHILIZED, FOR SOLUTION INTRAVENOUS at 11:16

## 2021-06-21 RX ADMIN — PIPERACILLIN AND TAZOBACTAM 25 GRAM(S): 4; .5 INJECTION, POWDER, LYOPHILIZED, FOR SOLUTION INTRAVENOUS at 01:57

## 2021-06-21 RX ADMIN — HEPARIN SODIUM 5000 UNIT(S): 5000 INJECTION INTRAVENOUS; SUBCUTANEOUS at 13:12

## 2021-06-21 RX ADMIN — HEPARIN SODIUM 5000 UNIT(S): 5000 INJECTION INTRAVENOUS; SUBCUTANEOUS at 06:25

## 2021-06-21 NOTE — DISCHARGE NOTE PROVIDER - NSDCMRMEDTOKEN_GEN_ALL_CORE_FT
cefpodoxime 200 mg oral tablet: 1 tab(s) orally every 12 hours   Flagyl 500 mg oral tablet: 1 tab(s) orally 3 times a day   hydrOXYzine hydrochloride 10 mg oral tablet: 1 tab(s) orally 2 times a day  sertraline 50 mg oral tablet: 1 tab(s) orally once a day

## 2021-06-21 NOTE — DISCHARGE NOTE PROVIDER - NSDCCPCAREPLAN_GEN_ALL_CORE_FT
PRINCIPAL DISCHARGE DIAGNOSIS  Diagnosis: Abscess  Assessment and Plan of Treatment:       SECONDARY DISCHARGE DIAGNOSES  Diagnosis: Perforated appendicitis  Assessment and Plan of Treatment:     Diagnosis: Generalized abdominal pain  Assessment and Plan of Treatment:

## 2021-06-21 NOTE — DISCHARGE NOTE PROVIDER - CARE PROVIDER_API CALL
Дмитрий Rodgers)  Surgery  [address] [place] [address]  Phone: [phone]  Fax: [phone]  Follow Up Time:

## 2021-06-21 NOTE — DISCHARGE NOTE PROVIDER - HOSPITAL COURSE
ED Surgery Intake HPI:  51 y/o M no pmhx presents to [place] ED in the evening on June 14th - c/o generalized abdominal pain x1 week with constipation. Pt states he was seen at [facility] and was sent home on stool softeners which provided minimal relief. PT states the pain has not improved however he did have small liquid bowel movements with the stool softener, however the persistent pain prompted him to visit the ED once again. At this time pt c/o continued pain without N/V, or radiation. PT describes pain as sharp in lower abdominal. PT denies chest pain, SOB, palpitations, fevers, chills or hematochezia.    On admission, patient was afebrile with stable VS, with leukocytosis at 13.28.  Started on Zosyn on 6/15.      CT scan performed on admission showed: No bowel obstruction, Appendix is is not definitively visualized. There is a large heterogeneous peripherally enhancing fluid collection in the right lower quadrant measure roughly 6.1 x 7.5 x 13.7 cm (AP, TR, CC), with adjacent foci of pneumoperitoneum.    Patient underwent CT-guided aspiration of the large abscess visualized on CT on 6/16 with 70cc of dunia pus drained, left in place.      Patient seen and followed by ID during his stay, continued Zosyn.  WC continued to trend down throughout his stay, and abdominal pain steadily improved.  Remained afebrile throughout his stay as well.     Due to perforated appendicitis with abscess, decision to hold off on surgical intervention was made for now - he will continue abx (transition to PO Vantin/Flagyl upon d/c home 6/21), we will maintain the drain, which will be managed by VNS as outpt at home.        Will f/u with Dr. Rodgers as outpt in his office in 1 week - will discuss interval appendectomy in the next few weeks at that time.

## 2021-06-21 NOTE — PROGRESS NOTE ADULT - SUBJECTIVE AND OBJECTIVE BOX
[facility]  INFECTIOUS DISEASES   [address] [place] [address]  Tel: [phone]     Fax: [phone]  =======================================================    MRN-[id number]  JEANE DOBLADILLANNALISA     Follow up; Appendicitis     Doing better, No fever. 70cc of purulent fluid was removed by IR.   No pain, on regular diet with no problem.     PAST MEDICAL & SURGICAL HISTORY:  Depression  No significant past surgical history    Social Hx: no smoking, ETOH or drugs     FAMILY HISTORY:  No pertinent family history in first degree relatives    Allergies  No Known Allergies    Antibiotics:  piperacillin/tazobactam IVPB.. 3.375 Gram(s) IV Intermittent every 8 hours    REVIEW OF SYSTEMS:  CONSTITUTIONAL:  No Fever or chills  HEENT:  No diplopia or blurred vision.  No sore throat or runny nose.  CARDIOVASCULAR:  No chest pain or SOB.  RESPIRATORY:  No cough, shortness of breath, PND or orthopnea.  GASTROINTESTINAL:  No nausea, vomiting or diarrhea. + abdominal pain  GENITOURINARY:  No dysuria, frequency or urgency. No Blood in urine  MUSCULOSKELETAL:  no joint aches, no muscle pain  SKIN:  No change in skin, hair or nails.  NEUROLOGIC:  No paresthesias, fasciculations, seizures or weakness.  PSYCHIATRIC:  No disorder of thought or mood.  ENDOCRINE:  No heat or cold intolerance, polyuria or polydipsia.  HEMATOLOGICAL:  No easy bruising or bleeding.     Physical Exam:  Vital Signs Last 24 Hrs  T(C): 36.8 (21 Jun 2021 14:15), Max: 36.8 (21 Jun 2021 14:15)  T(F): 98.3 (21 Jun 2021 14:15), Max: 98.3 (21 Jun 2021 14:15)  HR: 68 (21 Jun 2021 14:15) (68 - 73)  BP: 126/75 (21 Jun 2021 14:15) (96/66 - 126/75)  BP(mean): --  RR: 18 (21 Jun 2021 14:15) (18 - 18)  SpO2: 92% (21 Jun 2021 14:15) (92% - 94%)  GEN: NAD  HEENT: normocephalic and atraumatic. EOMI. PERRL.    NECK: Supple.  No lymphadenopathy   LUNGS: Clear to auscultation.  HEART: Regular rate and rhythm without murmur.  ABDOMEN: Soft, and nondistended, tender in RLQ.  Positive bowel sounds. drain in place    : No CVA tenderness  EXTREMITIES: Without any cyanosis, clubbing, rash, lesions or edema.  NEUROLOGIC: grossly intact.  PSYCHIATRIC: Appropriate affect .  SKIN: No ulceration or induration present.    Labs:  Culture - Body Fluid with Gram Stain (collected 06-16-21 @ 22:01)  Source: .Body Fluid rlq fluid collection  Gram Stain (06-17-21 @ 02:57):    Numerous polymorphonuclear leukocytes seen per low power field    Numerous Gram Variable Rods seen per oil power field    Numerous Gram Positive Cocci in Pairs and Chains seen per oil power field  Final Report (06-18-21 @ 19:20):    Moderate Escherichia coli    Moderate Streptococcus anginosus    Moderate Bacteroides fragilis "Susceptibilities not performed"  Organism: Escherichia coli  Streptococcus anginosus (06-18-21 @ 19:20)  Organism: Streptococcus anginosus (06-18-21 @ 19:20)    Sensitivities:      -  Clindamycin: S      -  Erythromycin: S      -  Vancomycin: S      Method Type: KB  Organism: Streptococcus anginosus (06-18-21 @ 19:20)    Sensitivities:      -  Ceftriaxone: S 0.5      -  Penicillin: I 0.25      Method Type: ETEST  Organism: Escherichia coli (06-18-21 @ 19:20)    Sensitivities:      -  Amikacin: S <=16      -  Amoxicillin/Clavulanic Acid: S <=8/4      -  Ampicillin: S <=8 These ampicillin results predict results for amoxicillin      -  Ampicillin/Sulbactam: S <=4/2 Enterobacter, Citrobacter, and Serratia may develop resistance during prolonged therapy (3-4 days)      -  Aztreonam: S <=4      -  Cefazolin: S <=2 Enterobacter, Citrobacter, and Serratia may develop resistance during prolonged therapy (3-4 days)      -  Cefepime: S <=2      -  Cefoxitin: S <=8      -  Ceftriaxone: S <=1 Enterobacter, Citrobacter, and Serratia may develop resistance during prolonged therapy      -  Ciprofloxacin: I 0.5      -  Ertapenem: S <=0.5      -  Gentamicin: S <=2      -  Levofloxacin: S <=0.5      -  Meropenem: S <=1      -  Piperacillin/Tazobactam: S <=8      -  Tobramycin: S <=2      -  Trimethoprim/Sulfamethoxazole: S <=0.5/9.5      Method Type: CLEM    Culture - Blood (collected 06-15-21 @ 09:05)  Source: .Blood Blood  Final Report (06-20-21 @ 10:00):    No Growth Final    Culture - Blood (collected 06-15-21 @ 09:05)  Source: .Blood Blood  Final Report (06-20-21 @ 10:00):    No Growth Final    Culture - Urine (collected 06-15-21 @ 04:08)  Source: .Urine Clean Catch (Midstream)  Final Report (06-16-21 @ 03:15):    <10,000 CFU/mL Normal Urogenital Benita    WBC Count: 8.17 K/uL (06-19-21 @ 09:21)  WBC Count: 9.42 K/uL (06-18-21 @ 07:11)  WBC Count: 8.67 K/uL (06-17-21 @ 06:29)    Creatinine, Serum: 0.67 mg/dL (06-19-21 @ 09:21)  Creatinine, Serum: 0.53 mg/dL (06-18-21 @ 07:11)  Creatinine, Serum: 0.49 mg/dL (06-17-21 @ 06:29)    COVID-19 PCR: NotDetec (06-15-21 @ 02:17)    All imaging and other data have been reviewed.  < from: CT Abdomen and Pelvis w/ IV Cont (06.15.21 @ 01:30) >  EXAM:  CT ABDOMEN AND PELVIS IC                        PROCEDURE DATE:  06/15/2021    INTERPRETATION:  CLINICAL INFORMATION: 50 years old. Male. RLQ abdominal pain.  COMPARISON: None.  CONTRAST/COMPLICATIONS:  IV Contrast: Omnipaque 350  90 cc administered  10 cc discarded.  Oral Contrast: NONE  Complications: None reported at time of study completion  PROCEDURE:  CT of the Abdomen and Pelvis was performed.  Sagittal and coronal reformats were performed.  FINDINGS:  LOWER CHEST: Mild bibasilar atelectasis.  LIVER: Within normal limits.  BILE DUCTS: Normal caliber.  GALLBLADDER: Within normal limits.  SPLEEN: Within normal limits.  PANCREAS: Within normal limits.  ADRENALS: Within normal limits.  KIDNEYS/URETERS: Enhance symmetrically. No hydronephrosis.  BLADDER: Within normal limits.  REPRODUCTIVE ORGANS: Within normal limits.  BOWEL: No bowel obstruction. Appendix is is not definitively visualized. There is a large heterogeneous peripherally enhancing fluid collection in the right lower quadrant measure roughly 6.1 x 7.5 x 13.7 cm (AP, TR, CC), with adjacent foci of pneumoperitoneum.  PERITONEUM: No ascites.  VESSELS: Normal caliber abdominal aorta.  RETROPERITONEUM/LYMPH NODES: No lymphadenopathy.  ABDOMINAL WALL: Within normal limits.  BONES: Degenerative changes in the spine.  IMPRESSION:  Findings concerning for contained perforated appendicitis with a large heterogeneous abscess.    Assessment and Plan:   49 y/o man with no significant PMH was admitted at [facility] on 6/15 with generalized abdominal pain for one week.   CT showed perforated appendicitis.  Leukocytosis 13k-->8k  UA and UC negative   Blood culture x 2 NGTD,  s/p IR drainage 70cc purulent fluid    Perforated appendicitis  - Fluid culture with mixed benita, strep, bacteroides, Ecoli  - tolerating regular diet  - Possible repeat CT in near future   - switch zosyn to Vantin 200mg q12 + Flagyl 500mg q8h) for 2 weeks total with surgery follow up to extend the course based on his response.   - Drain care as per surgery.     Will sign off please call with any question.     Chong Winslow MD  Division of Infectious Diseases   Cell [phone] between 8am and 6pm   After 6pm and weekends please call ID service at [phone].

## 2021-06-21 NOTE — DISCHARGE NOTE PROVIDER - NSDCHHHOMEBOUNDOTHER_GEN_ALL_CORE_FT
For current, active intra-abdominal infection with abscess requiring daily VNS for drain management.

## 2021-06-21 NOTE — PROGRESS NOTE ADULT - PROVIDER SPECIALTY LIST ADULT
Surgery
Surgery
Infectious Disease
Surgery
Surgery
Infectious Disease
Infectious Disease
Surgery
Infectious Disease
Surgery

## 2021-06-21 NOTE — PROGRESS NOTE ADULT - SUBJECTIVE AND OBJECTIVE BOX
SURGERY PA NOTE ON BEHALF OF DR. MCKEON / GENERAL SURGERY:    S: Patient seen and examined at bedside.  Denies any new complaints.  Reports further improvement in RLQ abdominal pain.  IR drain still in place.  Denies F/C/N/V.  Tolerating diet, reports +BM/+F.      MEDICATIONS:  acetaminophen   Tablet .. 650 milliGRAM(s) Oral every 6 hours PRN  heparin   Injectable 5000 Unit(s) SubCutaneous every 8 hours  pantoprazole    Tablet 40 milliGRAM(s) Oral before breakfast  piperacillin/tazobactam IVPB.. 3.375 Gram(s) IV Intermittent every 8 hours  senna 2 Tablet(s) Oral at bedtime  sertraline 50 milliGRAM(s) Oral daily      O:  Vital Signs Last 24 Hrs  T(C): 36.7 (21 Jun 2021 04:29), Max: 36.7 (20 Jun 2021 19:20)  T(F): 98.1 (21 Jun 2021 04:29), Max: 98.1 (21 Jun 2021 04:29)  HR: 68 (21 Jun 2021 04:29) (68 - 73)  BP: 99/60 (21 Jun 2021 04:29) (96/66 - 99/60)  BP(mean): --  RR: 18 (21 Jun 2021 04:29) (18 - 18)  SpO2: 92% (21 Jun 2021 04:29) (92% - 94%)    I&O SUMMARY:    06-20-21 @ 07:01  -  06-21-21 @ 07:00  --------------------------------------------------------  IN: 0 mL / OUT: 57 mL / NET: -57 mL        PHYSICAL EXAM:  Lungs: CTA bilat without W/R/R  Card: S1S2  Abd: Soft, minimal tenderness over RLQ, ND.  +BS x 4.  No rebound/guarding.  Drain site intact without drainage or erythema - site dressing changed today.    Ext: Calves soft, NT, without edema bilat    A:   49yo male with perforated appendicitis, s/p IR drainage for large intra-abdominal abscess    P:  Maintain intra-abdominal drain  For d/c home today with VNS for drain management  Will transition to and continue PO abx at home (Vantin 200mg BID x 14d, and flagyl 500mg TID x 14d)  Tylenol/Motrin OTC for pain  Patient will require education on how to flush drain tubing with 5-10cc NS daily  Follow-up with Dr. Rodgers in his office next week for re-evaluation, and discussion of eventual interval appendectomy   Will d/w Dr. Mckeon

## 2021-06-21 NOTE — DISCHARGE NOTE PROVIDER - NSDCFUADDINST_GEN_ALL_CORE_FT
Do not take tub bath or shower - spongebathe only while drain is in place.      No heavy lifting (nothing heavier than 5 lbs.), no strenuous physical activity, no exercise.      You may perform your usual daily tasks as tolerated (such as walking, stairclimbing).      Tylenol Extra-Strength over the counter 500mg pills - take 2 every six hours regularly for the first 4 days after discharge, then as needed for pain.       Motrin 200mg pills - take 2-3 pill every 6 hours regularly for the first 4 days after discharge (take with food), then as needed for pain.          Follow-up with Dr. Rodgers in his office next week - call office for appointment (see below).   Patient will require daily flushes of the drain/tubing with 5-10cc of NS.      Do not take tub bath or shower - spongebathe only while drain is in place.      No heavy lifting (nothing heavier than 5 lbs.), no strenuous physical activity, no exercise.      You may perform your usual daily tasks as tolerated (such as walking, stairclimbing).      Tylenol Extra-Strength over the counter 500mg pills - take 2 every six hours regularly for the first 4 days after discharge, then as needed for pain.       Motrin 200mg pills - take 2-3 pill every 6 hours regularly for the first 4 days after discharge (take with food), then as needed for pain.          Follow-up with Dr. Rodgers in his office next week - call office for appointment (see below).

## 2021-06-21 NOTE — DISCHARGE NOTE NURSING/CASE MANAGEMENT/SOCIAL WORK - NSDCPEFALRISK_GEN_ALL_CORE
[facility]  OCCUPATIONAL THERAPY  No Visit Note    [] ICU    [x] Acute   Patient: Hamlet Chairez  Room: [facility]9960-      Hamlet Chairez not seen on 4/5/2019 at 10:25 AM due to discharge summary in place per PA          Signature: Laura YEPEZ Patient information on fall and injury prevention

## 2021-06-21 NOTE — DISCHARGE NOTE NURSING/CASE MANAGEMENT/SOCIAL WORK - PATIENT PORTAL LINK FT
You can access the FollowMyHealth Patient Portal offered by [facility] by registering at the following website: [email or web address]. By joining [employer]’s FollowMyHealth portal, you will also be able to view your health information using other applications (apps) compatible with our system.

## 2021-06-25 ENCOUNTER — NON-APPOINTMENT (OUTPATIENT)
Age: 51
End: 2021-06-25

## 2021-06-25 RX ORDER — MOXIFLOXACIN HYDROCHLORIDE TABLETS, 400 MG 400 MG/1
1 TABLET, FILM COATED ORAL
Qty: 28 | Refills: 0
Start: 2021-06-25 | End: 2021-07-08

## 2021-06-25 NOTE — CHART NOTE - NSCHARTNOTEFT_GEN_A_CORE
Called by home care nurse that patient unable to  prescription from pharmacy due to issues, called [facility] pharmacy and was told Vantin required prior auth, called for prior auth - would not get answer for 34 hours reached out to ID Dr. Winslow recommended sending rx for cipro    Sx [id number] Called by home care nurse that patient unable to  prescription from pharmacy due to issues, called [facility] pharmacy and was told Vantin required prior auth, called for prior auth - would not get answer for 24 hours reached out to ID Dr. Winslow recommended sending rx for augmentin.     Sx [id number] Called by home care nurse that patient unable to  prescription from pharmacy due to issues, called [facility] pharmacy and was told Vantin required prior auth, called for prior auth - would not get answer for 24 hours reached out to ID Dr. Winslow recommended sending rx for augmentin. Called patient and told him to  Augmentin at [facility]- he understands and will send his brother to pick it up     [id number]

## 2021-07-08 ENCOUNTER — APPOINTMENT (OUTPATIENT)
Dept: SURGERY | Facility: CLINIC | Age: 51
End: 2021-07-08

## 2021-07-09 RX ORDER — GAUZE BANDAGE 2" X 2"
3"X3" BANDAGE TOPICAL
Qty: 30 | Refills: 5 | Status: ACTIVE | OUTPATIENT
Start: 2021-07-09

## 2021-07-19 ENCOUNTER — RESULT REVIEW (OUTPATIENT)
Age: 51
End: 2021-07-19

## 2021-07-22 ENCOUNTER — APPOINTMENT (OUTPATIENT)
Dept: SURGERY | Facility: CLINIC | Age: 51
End: 2021-07-22

## 2021-07-22 ENCOUNTER — OUTPATIENT (OUTPATIENT)
Dept: OUTPATIENT SERVICES | Facility: HOSPITAL | Age: 51
LOS: 1 days | End: 2021-07-22
Payer: MEDICAID

## 2021-07-22 ENCOUNTER — APPOINTMENT (OUTPATIENT)
Dept: CT IMAGING | Facility: CLINIC | Age: 51
End: 2021-07-22
Payer: MEDICAID

## 2021-07-22 DIAGNOSIS — K65.1 PERITONEAL ABSCESS: ICD-10-CM

## 2021-07-22 PROCEDURE — 74177 CT ABD & PELVIS W/CONTRAST: CPT | Mod: 26

## 2021-07-22 PROCEDURE — 74177 CT ABD & PELVIS W/CONTRAST: CPT

## 2021-07-23 ENCOUNTER — NON-APPOINTMENT (OUTPATIENT)
Age: 51
End: 2021-07-23

## 2021-07-26 ENCOUNTER — NON-APPOINTMENT (OUTPATIENT)
Age: 51
End: 2021-07-26

## 2021-07-27 ENCOUNTER — APPOINTMENT (OUTPATIENT)
Dept: GASTROENTEROLOGY | Facility: CLINIC | Age: 51
End: 2021-07-27
Payer: MEDICAID

## 2021-07-27 ENCOUNTER — APPOINTMENT (OUTPATIENT)
Dept: SURGERY | Facility: CLINIC | Age: 51
End: 2021-07-27
Payer: MEDICAID

## 2021-07-27 VITALS
HEART RATE: 78 BPM | WEIGHT: 184 LBS | SYSTOLIC BLOOD PRESSURE: 130 MMHG | BODY MASS INDEX: 34.74 KG/M2 | DIASTOLIC BLOOD PRESSURE: 74 MMHG | OXYGEN SATURATION: 97 % | HEIGHT: 61 IN

## 2021-07-27 DIAGNOSIS — K35.32 ACUTE APPENDICITIS W/ PERFORATION AND LOCALIZED PERITONITIS, W/O ABSCESS: ICD-10-CM

## 2021-07-27 DIAGNOSIS — K65.1 PERITONEAL ABSCESS: ICD-10-CM

## 2021-07-27 PROCEDURE — 99203 OFFICE O/P NEW LOW 30 MIN: CPT

## 2021-07-27 PROCEDURE — 99204 OFFICE O/P NEW MOD 45 MIN: CPT

## 2021-07-27 NOTE — ASSESSMENT
[FreeTextEntry1] : Karl is a pleasant 50 year old with recent hospitalization for perforated appendicitis where he underwent IR drainage of a large intra-abdominal abscess.  He was just seen today by Dr Rodgers for the drain and to schedule appendectomy. He has had continued purulent drainage from the drain.  He has been receiving VNS for drain care. He had a recent follow up CT scan and the fluid collection has decreased in size. The cecum appears abnormal with thickening and irregular shape. He needs a colonoscopy to look for malignancy or other infiltrative process. \par This will be scheduled 6-8 weeks after drain placement. The patient and his brother are counseled on the plan of care and the potential complications associated with diagnosis testing in the setting of possible diverticular abscess and perforation. At the same time, he will need visualization to confirm diagnosis. \par He also had H Pylori and he will have a UBT tomorrow. He is not on PPI and has not been on ABx for over 2 weeks, nor does he take PeptoBismol. \par \par I explained to the patient the risks, alternatives and benefits to a colonoscopy. Risk including but not limited to bleeding, perforation, infection adverse medication reaction. Questions were answered. agreeable to proceed with the planned procedures. \par \par Reviewed and reconciled medications, allergies, PMHx, PSHx, SocHx, FMHx. Reviewed imaging, blood work, diagnostic testing, discussed with patient. Further recommendations pending results of above work up and evaluation.\par

## 2021-07-27 NOTE — PHYSICAL EXAM
[General Appearance - Alert] : alert [General Appearance - In No Acute Distress] : in no acute distress [Auscultation Breath Sounds / Voice Sounds] : lungs were clear to auscultation bilaterally [Heart Rate And Rhythm] : heart rate was normal and rhythm regular [Heart Sounds] : normal S1 and S2 [Heart Sounds Gallop] : no gallops [Murmurs] : no murmurs [Heart Sounds Pericardial Friction Rub] : no pericardial rub [Bowel Sounds] : normal bowel sounds [Abdomen Tenderness] : non-tender [Abdomen Soft] : soft [] : no hepato-splenomegaly [Abdomen Mass (___ Cm)] : no abdominal mass palpated [Abnormal Walk] : normal gait [Nail Clubbing] : no clubbing  or cyanosis of the fingernails [Musculoskeletal - Swelling] : no joint swelling seen [Motor Tone] : muscle strength and tone were normal

## 2021-07-27 NOTE — CONSULT LETTER
[Dear  ___] : Dear  [unfilled], [Consult Letter:] : I had the pleasure of evaluating your patient, [unfilled]. [Consult Closing:] : Thank you very much for allowing me to participate in the care of this patient.  If you have any questions, please do not hesitate to contact me. [Sincerely,] : Sincerely, [FreeTextEntry3] : Nani Berrios MD\par Gastroenterology, Hepatology and Motility\par  [FreeTextEntry1] : Karl is a pleasant 50 year old with recent hospitalization for perforated appendicitis where he underwent IR drainage of a large intra-abdominal abscess.  He was just seen today by Dr Rodgers for the drain and to schedule appendectomy. He has had continued purulent drainage from the drain.  He has been receiving VNS for drain care. He had a recent follow up CT scan and the fluid collection has decreased in size. The cecum appears abnormal with thickening and irregular shape. He needs a colonoscopy to look for malignancy or other infiltrative process. \par This will be scheduled 6-8 weeks after drain placement. The patient and his brother are counseled on the plan of care and the potential complications associated with diagnosis testing in the setting of possible diverticular abscess and perforation. At the same time, he will need visualization to confirm diagnosis. \par He also had H Pylori and he will have a UBT tomorrow. He is not on PPI and has not been on ABx for over 2 weeks, nor does he take PeptoBismol. \par \par I explained to the patient the risks, alternatives and benefits to a colonoscopy. Risk including but not limited to bleeding, perforation, infection adverse medication reaction. Questions were answered. agreeable to proceed with the planned procedures. \par \par Reviewed and reconciled medications, allergies, PMHx, PSHx, SocHx, FMHx. Reviewed imaging, blood work, diagnostic testing, discussed with patient. Further recommendations pending results of above work up and evaluation.\par

## 2021-07-27 NOTE — HISTORY OF PRESENT ILLNESS
[de-identified] : Karl is a pleasant 50 year old male who appears older than stated age, presenting today with his brother. He is not a good historian and his brother provided information. he lives with his parents. was admitted with abdominal pain, found to have perforated appendicitis. He has IR placed drain. needs to have eventual appy but has not had colonoscopy . also with reflux and dyspepsia. attributes to overeating. however, he had H Pylori gastritis found on EGD about 3 years ago and lost to follow up . denies diarrhea, bleeding or melena.

## 2021-07-29 DIAGNOSIS — A04.8 OTHER SPECIFIED BACTERIAL INTESTINAL INFECTIONS: ICD-10-CM

## 2021-07-30 LAB — UREA BREATH TEST QL: NEGATIVE

## 2021-08-24 DIAGNOSIS — R10.30 LOWER ABDOMINAL PAIN, UNSPECIFIED: ICD-10-CM

## 2021-08-24 DIAGNOSIS — R10.9 UNSPECIFIED ABDOMINAL PAIN: ICD-10-CM

## 2021-08-24 RX ORDER — POLYETHYLENE GLYCOL 3350 17 G/17G
17 POWDER, FOR SOLUTION ORAL
Qty: 1 | Refills: 0 | Status: ACTIVE | COMMUNITY
Start: 2021-08-24 | End: 1900-01-01

## 2021-08-26 ENCOUNTER — APPOINTMENT (OUTPATIENT)
Dept: SURGERY | Facility: CLINIC | Age: 51
End: 2021-08-26

## 2021-08-31 ENCOUNTER — APPOINTMENT (OUTPATIENT)
Dept: SURGERY | Facility: CLINIC | Age: 51
End: 2021-08-31
Payer: MEDICAID

## 2021-08-31 PROCEDURE — 99214 OFFICE O/P EST MOD 30 MIN: CPT

## 2021-09-04 ENCOUNTER — TRANSCRIPTION ENCOUNTER (OUTPATIENT)
Age: 51
End: 2021-09-04

## 2021-09-08 ENCOUNTER — APPOINTMENT (OUTPATIENT)
Dept: GASTROENTEROLOGY | Facility: AMBULATORY MEDICAL SERVICES | Age: 51
End: 2021-09-08
Payer: MEDICAID

## 2021-09-08 ENCOUNTER — RESULT REVIEW (OUTPATIENT)
Age: 51
End: 2021-09-08

## 2021-09-08 PROCEDURE — 45380 COLONOSCOPY AND BIOPSY: CPT

## 2021-09-12 DIAGNOSIS — K50.813 CROHN'S DISEASE OF BOTH SMALL AND LARGE INTESTINE WITH FISTULA: ICD-10-CM

## 2021-09-14 ENCOUNTER — APPOINTMENT (OUTPATIENT)
Dept: SURGERY | Facility: CLINIC | Age: 51
End: 2021-09-14
Payer: MEDICAID

## 2021-09-14 ENCOUNTER — OUTPATIENT (OUTPATIENT)
Dept: OUTPATIENT SERVICES | Facility: HOSPITAL | Age: 51
LOS: 1 days | End: 2021-09-14
Payer: MEDICAID

## 2021-09-14 VITALS
WEIGHT: 182.1 LBS | RESPIRATION RATE: 16 BRPM | TEMPERATURE: 97 F | OXYGEN SATURATION: 98 % | HEART RATE: 72 BPM | SYSTOLIC BLOOD PRESSURE: 99 MMHG | DIASTOLIC BLOOD PRESSURE: 68 MMHG

## 2021-09-14 VITALS
HEIGHT: 61 IN | DIASTOLIC BLOOD PRESSURE: 67 MMHG | HEART RATE: 67 BPM | BODY MASS INDEX: 34.93 KG/M2 | OXYGEN SATURATION: 95 % | WEIGHT: 185 LBS | SYSTOLIC BLOOD PRESSURE: 90 MMHG

## 2021-09-14 DIAGNOSIS — Z98.890 OTHER SPECIFIED POSTPROCEDURAL STATES: Chronic | ICD-10-CM

## 2021-09-14 DIAGNOSIS — K35.20 ACUTE APPENDICITIS WITH GENERALIZED PERITONITIS, WITHOUT ABSCESS: ICD-10-CM

## 2021-09-14 DIAGNOSIS — G30.9 ALZHEIMER'S DISEASE, UNSPECIFIED: Chronic | ICD-10-CM

## 2021-09-14 DIAGNOSIS — Z01.818 ENCOUNTER FOR OTHER PREPROCEDURAL EXAMINATION: ICD-10-CM

## 2021-09-14 LAB
ALBUMIN SERPL ELPH-MCNC: 3.3 G/DL — SIGNIFICANT CHANGE UP (ref 3.3–5)
ALP SERPL-CCNC: 119 U/L — SIGNIFICANT CHANGE UP (ref 40–120)
ALT FLD-CCNC: 31 U/L — SIGNIFICANT CHANGE UP (ref 12–78)
ANION GAP SERPL CALC-SCNC: 4 MMOL/L — LOW (ref 5–17)
AST SERPL-CCNC: 19 U/L — SIGNIFICANT CHANGE UP (ref 15–37)
BILIRUB SERPL-MCNC: 1 MG/DL — SIGNIFICANT CHANGE UP (ref 0.2–1.2)
BLD GP AB SCN SERPL QL: SIGNIFICANT CHANGE UP
BUN SERPL-MCNC: 9 MG/DL — SIGNIFICANT CHANGE UP (ref 7–23)
CALCIUM SERPL-MCNC: 8.8 MG/DL — SIGNIFICANT CHANGE UP (ref 8.5–10.1)
CHLORIDE SERPL-SCNC: 104 MMOL/L — SIGNIFICANT CHANGE UP (ref 96–108)
CO2 SERPL-SCNC: 28 MMOL/L — SIGNIFICANT CHANGE UP (ref 22–31)
CREAT SERPL-MCNC: 0.78 MG/DL — SIGNIFICANT CHANGE UP (ref 0.5–1.3)
GLUCOSE SERPL-MCNC: 97 MG/DL — SIGNIFICANT CHANGE UP (ref 70–99)
HCT VFR BLD CALC: 44 % — SIGNIFICANT CHANGE UP (ref 39–50)
HGB BLD-MCNC: 14.8 G/DL — SIGNIFICANT CHANGE UP (ref 13–17)
MCHC RBC-ENTMCNC: 29.2 PG — SIGNIFICANT CHANGE UP (ref 27–34)
MCHC RBC-ENTMCNC: 33.6 GM/DL — SIGNIFICANT CHANGE UP (ref 32–36)
MCV RBC AUTO: 86.8 FL — SIGNIFICANT CHANGE UP (ref 80–100)
NRBC # BLD: 0 /100 WBCS — SIGNIFICANT CHANGE UP (ref 0–0)
PLATELET # BLD AUTO: 334 K/UL — SIGNIFICANT CHANGE UP (ref 150–400)
POTASSIUM SERPL-MCNC: 3.9 MMOL/L — SIGNIFICANT CHANGE UP (ref 3.5–5.3)
POTASSIUM SERPL-SCNC: 3.9 MMOL/L — SIGNIFICANT CHANGE UP (ref 3.5–5.3)
PROT SERPL-MCNC: 8.6 G/DL — HIGH (ref 6–8.3)
RBC # BLD: 5.07 M/UL — SIGNIFICANT CHANGE UP (ref 4.2–5.8)
RBC # FLD: 14.4 % — SIGNIFICANT CHANGE UP (ref 10.3–14.5)
SODIUM SERPL-SCNC: 136 MMOL/L — SIGNIFICANT CHANGE UP (ref 135–145)
WBC # BLD: 7.43 K/UL — SIGNIFICANT CHANGE UP (ref 3.8–10.5)
WBC # FLD AUTO: 7.43 K/UL — SIGNIFICANT CHANGE UP (ref 3.8–10.5)

## 2021-09-14 PROCEDURE — 80053 COMPREHEN METABOLIC PANEL: CPT

## 2021-09-14 PROCEDURE — 86850 RBC ANTIBODY SCREEN: CPT

## 2021-09-14 PROCEDURE — 85027 COMPLETE CBC AUTOMATED: CPT

## 2021-09-14 PROCEDURE — 93010 ELECTROCARDIOGRAM REPORT: CPT

## 2021-09-14 PROCEDURE — 99214 OFFICE O/P EST MOD 30 MIN: CPT

## 2021-09-14 PROCEDURE — 86901 BLOOD TYPING SEROLOGIC RH(D): CPT

## 2021-09-14 PROCEDURE — G0463: CPT

## 2021-09-14 PROCEDURE — 86900 BLOOD TYPING SEROLOGIC ABO: CPT

## 2021-09-14 PROCEDURE — 93005 ELECTROCARDIOGRAM TRACING: CPT

## 2021-09-14 PROCEDURE — 36415 COLL VENOUS BLD VENIPUNCTURE: CPT

## 2021-09-14 RX ORDER — HYDROXYZINE HCL 10 MG
1 TABLET ORAL
Qty: 0 | Refills: 0 | DISCHARGE

## 2021-09-14 NOTE — H&P PST ADULT - GASTROINTESTINAL COMMENTS
See HPI Pt with pigtail catheter to right flank area draining minimal serosanguinous drainage, skin intact, no signs of infection

## 2021-09-14 NOTE — H&P PST ADULT - NSICDXPASTMEDICALHX_GEN_ALL_CORE_FT
PAST MEDICAL HISTORY:  Depression      PAST MEDICAL HISTORY:  Acute appendicitis with generalized peritonitis     Depression      PAST MEDICAL HISTORY:  Acute appendicitis with generalized peritonitis     Depression     History of Crohn's disease (Stable, no meds)

## 2021-09-14 NOTE — H&P PST ADULT - NSANTHOSAYNRD_GEN_A_CORE
No. JOMAR screening performed.  STOP BANG Legend: 0-2 = LOW Risk; 3-4 = INTERMEDIATE Risk; 5-8 = HIGH Risk

## 2021-09-14 NOTE — H&P PST ADULT - PROBLEM SELECTOR PLAN 2
Medical clearance needed as per surgeon. CBC, Comprehensive panel, T&S and EKG ordered. Pre-op instructions and surgical scrubs given and pt verbalized understanding. COVID19 PCR testing to be done within 72 hours of surgery at [facility].

## 2021-09-14 NOTE — H&P PST ADULT - GENERAL COMMENTS
Pt denies having traveled outside the country for the last 14 days. Pt denies having had the COVID19 infection and denies COVID19 positive contacts within the last 14 days. Pt received the 2nd dose of the COVID19 vaccine on 4/1/2021. Pt denies having traveled outside the country for the last 14 days. Pt denies having had the COVID19 infection and denies COVID19 positive contacts within the last 14 days. Pt received the 2nd dose of the Pfizer COVID19 vaccine on 5/19/2021.

## 2021-09-14 NOTE — H&P PST ADULT - NSICDXFAMILYHX_GEN_ALL_CORE_FT
FAMILY HISTORY:  Father  Still living? Yes, Estimated age: Age Unknown  FH: Alzheimers disease, Age at diagnosis: Age Unknown

## 2021-09-14 NOTE — H&P PST ADULT - HISTORY OF PRESENT ILLNESS
52 y/o male with   Pt was admitted on 6/14/21 for abdominal pain.   Pt denies n/v/d and abdominal pain.   Pt electing for laparoscopic appendectomy on 9/27/2021.  50 y/o male with PMH of depression here for PST. Pt was admitted at [facility]  on 6/14/21 for abdominal pain. Pt s/p CT guided aspiration of large abscess with 70cc of dunia pus drained and diagnosed with perforated appendicitis with abscess. Pt discharged home with PO Vantin and Flagyl. Pt denies n/v/d and abdominal pain. Pt still with pigtail catheter to right flank draining minimal serosanguinous drainage. Pt denies right flank pain. Pt electing for laparoscopic appendectomy on 9/27/2021.

## 2021-09-16 RX ORDER — NEOMYCIN SULFATE 500 MG/1
1 TABLET ORAL
Qty: 3 | Refills: 0
Start: 2021-09-16

## 2021-09-16 RX ORDER — SOD SULF/SODIUM/NAHCO3/KCL/PEG
1 SOLUTION, RECONSTITUTED, ORAL ORAL
Qty: 1 | Refills: 0
Start: 2021-09-16

## 2021-09-16 RX ORDER — ERYTHROMYCIN ETHYLSUCCINATE 400 MG
1 TABLET ORAL
Qty: 3 | Refills: 0
Start: 2021-09-16

## 2021-09-22 ENCOUNTER — APPOINTMENT (OUTPATIENT)
Dept: CT IMAGING | Facility: CLINIC | Age: 51
End: 2021-09-22
Payer: MEDICAID

## 2021-09-22 ENCOUNTER — OUTPATIENT (OUTPATIENT)
Dept: OUTPATIENT SERVICES | Facility: HOSPITAL | Age: 51
LOS: 1 days | End: 2021-09-22
Payer: MEDICAID

## 2021-09-22 DIAGNOSIS — K65.1 PERITONEAL ABSCESS: ICD-10-CM

## 2021-09-22 DIAGNOSIS — K50.813 CROHN'S DISEASE OF BOTH SMALL AND LARGE INTESTINE WITH FISTULA: ICD-10-CM

## 2021-09-22 DIAGNOSIS — Z98.890 OTHER SPECIFIED POSTPROCEDURAL STATES: Chronic | ICD-10-CM

## 2021-09-22 PROBLEM — Z87.19 PERSONAL HISTORY OF OTHER DISEASES OF THE DIGESTIVE SYSTEM: Chronic | Status: ACTIVE | Noted: 2021-09-14

## 2021-09-22 PROBLEM — K35.20 ACUTE APPENDICITIS WITH GENERALIZED PERITONITIS, WITHOUT ABSCESS: Chronic | Status: ACTIVE | Noted: 2021-09-14

## 2021-09-22 PROCEDURE — 82565 ASSAY OF CREATININE: CPT

## 2021-09-22 PROCEDURE — 74177 CT ABD & PELVIS W/CONTRAST: CPT

## 2021-09-22 PROCEDURE — 74177 CT ABD & PELVIS W/CONTRAST: CPT | Mod: 26

## 2021-09-24 ENCOUNTER — APPOINTMENT (OUTPATIENT)
Dept: INTERNAL MEDICINE | Facility: CLINIC | Age: 51
End: 2021-09-24

## 2021-09-27 ENCOUNTER — APPOINTMENT (OUTPATIENT)
Dept: SURGERY | Facility: HOSPITAL | Age: 51
End: 2021-09-27

## 2021-10-15 ENCOUNTER — OUTPATIENT (OUTPATIENT)
Dept: OUTPATIENT SERVICES | Facility: HOSPITAL | Age: 51
LOS: 1 days | End: 2021-10-15
Payer: MEDICAID

## 2021-10-15 DIAGNOSIS — Z98.890 OTHER SPECIFIED POSTPROCEDURAL STATES: Chronic | ICD-10-CM

## 2021-10-15 DIAGNOSIS — Z20.828 CONTACT WITH AND (SUSPECTED) EXPOSURE TO OTHER VIRAL COMMUNICABLE DISEASES: ICD-10-CM

## 2021-10-15 LAB — SARS-COV-2 RNA SPEC QL NAA+PROBE: SIGNIFICANT CHANGE UP

## 2021-10-15 PROCEDURE — U0005: CPT

## 2021-10-15 PROCEDURE — U0003: CPT

## 2021-10-17 ENCOUNTER — TRANSCRIPTION ENCOUNTER (OUTPATIENT)
Age: 51
End: 2021-10-17

## 2021-10-18 ENCOUNTER — RESULT REVIEW (OUTPATIENT)
Age: 51
End: 2021-10-18

## 2021-10-18 ENCOUNTER — APPOINTMENT (OUTPATIENT)
Dept: SURGERY | Facility: HOSPITAL | Age: 51
End: 2021-10-18

## 2021-10-18 ENCOUNTER — OUTPATIENT (OUTPATIENT)
Dept: OUTPATIENT SERVICES | Facility: HOSPITAL | Age: 51
LOS: 1 days | End: 2021-10-18
Payer: MEDICAID

## 2021-10-18 VITALS
HEART RATE: 58 BPM | SYSTOLIC BLOOD PRESSURE: 105 MMHG | OXYGEN SATURATION: 94 % | DIASTOLIC BLOOD PRESSURE: 72 MMHG | RESPIRATION RATE: 14 BRPM

## 2021-10-18 VITALS
RESPIRATION RATE: 15 BRPM | DIASTOLIC BLOOD PRESSURE: 67 MMHG | WEIGHT: 182.1 LBS | HEART RATE: 70 BPM | OXYGEN SATURATION: 95 % | TEMPERATURE: 98 F | SYSTOLIC BLOOD PRESSURE: 98 MMHG

## 2021-10-18 DIAGNOSIS — K35.20 ACUTE APPENDICITIS WITH GENERALIZED PERITONITIS, WITHOUT ABSCESS: ICD-10-CM

## 2021-10-18 DIAGNOSIS — Z98.890 OTHER SPECIFIED POSTPROCEDURAL STATES: Chronic | ICD-10-CM

## 2021-10-18 PROCEDURE — 49585: CPT

## 2021-10-18 PROCEDURE — 88302 TISSUE EXAM BY PATHOLOGIST: CPT | Mod: 26

## 2021-10-18 PROCEDURE — C1889: CPT

## 2021-10-18 PROCEDURE — 11042 DBRDMT SUBQ TIS 1ST 20SQCM/<: CPT

## 2021-10-18 PROCEDURE — 49585: CPT | Mod: AS,79,22

## 2021-10-18 PROCEDURE — 88307 TISSUE EXAM BY PATHOLOGIST: CPT | Mod: 26

## 2021-10-18 PROCEDURE — 44204 LAPARO PARTIAL COLECTOMY: CPT | Mod: 52,79

## 2021-10-18 PROCEDURE — 88302 TISSUE EXAM BY PATHOLOGIST: CPT

## 2021-10-18 PROCEDURE — 44204 LAPARO PARTIAL COLECTOMY: CPT | Mod: AS,79

## 2021-10-18 PROCEDURE — 44238 UNLISTED LAPS PX INTESTINE: CPT

## 2021-10-18 PROCEDURE — 49585: CPT | Mod: 79

## 2021-10-18 PROCEDURE — 88307 TISSUE EXAM BY PATHOLOGIST: CPT

## 2021-10-18 RX ORDER — SODIUM CHLORIDE 9 MG/ML
1000 INJECTION, SOLUTION INTRAVENOUS
Refills: 0 | Status: DISCONTINUED | OUTPATIENT
Start: 2021-10-18 | End: 2021-10-18

## 2021-10-18 RX ORDER — HYDROMORPHONE HYDROCHLORIDE 2 MG/ML
0.5 INJECTION INTRAMUSCULAR; INTRAVENOUS; SUBCUTANEOUS
Refills: 0 | Status: DISCONTINUED | OUTPATIENT
Start: 2021-10-18 | End: 2021-10-18

## 2021-10-18 RX ORDER — CEFOTETAN DISODIUM 1 G
2 VIAL (EA) INJECTION ONCE
Refills: 0 | Status: COMPLETED | OUTPATIENT
Start: 2021-10-18 | End: 2021-10-18

## 2021-10-18 RX ORDER — IBUPROFEN 200 MG
1 TABLET ORAL
Qty: 30 | Refills: 0
Start: 2021-10-18

## 2021-10-18 RX ORDER — ACETAMINOPHEN 500 MG
1000 TABLET ORAL ONCE
Refills: 0 | Status: DISCONTINUED | OUTPATIENT
Start: 2021-10-18 | End: 2021-10-18

## 2021-10-18 RX ORDER — OXYCODONE HYDROCHLORIDE 5 MG/1
1 TABLET ORAL
Qty: 15 | Refills: 0
Start: 2021-10-18

## 2021-10-18 RX ORDER — ACETAMINOPHEN 500 MG
2 TABLET ORAL
Qty: 20 | Refills: 0
Start: 2021-10-18

## 2021-10-18 RX ORDER — ONDANSETRON 8 MG/1
4 TABLET, FILM COATED ORAL ONCE
Refills: 0 | Status: DISCONTINUED | OUTPATIENT
Start: 2021-10-18 | End: 2021-10-18

## 2021-10-18 RX ADMIN — SODIUM CHLORIDE 60 MILLILITER(S): 9 INJECTION, SOLUTION INTRAVENOUS at 08:19

## 2021-10-18 NOTE — BRIEF OPERATIVE NOTE - NSICDXBRIEFPROCEDURE_GEN_ALL_CORE_FT
PROCEDURES:  Laparoscopic partial cecectomy 18-Oct-2021 12:25:13  Rekha Brar  Repair of umbilical hernia in adult 18-Oct-2021 12:27:45  Rekha Brar

## 2021-10-18 NOTE — ASU DISCHARGE PLAN (ADULT/PEDIATRIC) - ASU DC SPECIAL INSTRUCTIONSFT
keep the dressing dry x24 hr, then you can remove the dressing and shower and cover the hip incision with clean dressing.   for pain control you may take Tylenol and Motrin. if addition medication needed a prescription for narcotic has been send to your Pharmacy

## 2021-10-18 NOTE — ASU DISCHARGE PLAN (ADULT/PEDIATRIC) - CALL YOUR DOCTOR IF YOU HAVE ANY OF THE FOLLOWING:
100.4/Bleeding that does not stop/Pain not relieved by Medications/Fever greater than (need to indicate Fahrenheit or Celsius)/Nausea and vomiting that does not stop/Unable to urinate

## 2021-10-18 NOTE — ASU DISCHARGE PLAN (ADULT/PEDIATRIC) - CARE PROVIDER_API CALL
Дмитрий Rodgers)  Surgery  [address] [place] [address]  Phone: [phone]  Fax: [phone]  Follow Up Time: 1 week

## 2021-10-24 NOTE — PROGRESS NOTE ADULT - ASSESSMENT
"Hospital Medicine History & Physical Note    Date of Service  10/24/2021    Primary Care Physician  Janet Reddy M.D.    Consultants  None, however urology was called by ERP prior to decision to admit    Specialist Names: None    Code Status  Full Code    Chief Complaint  Chief Complaint   Patient presents with   • Urinary Catheter Problem     Pt BIB [facility] from [place] for blood in superpubic catheter - [facility] stated that staff at [place] changed catheter and noticed blood draining out; Pt mom stated that Pt had hardly any urine in FC collection bag, so [facility] staff changed catheter, then noticed \"a lot\" draining; Pt +nauseous but mom stated that this is normal; Pt has Hx of MS - very light sensitive; Pt mom stated that \"many bladder stones\" - has scheduled appt this Thursday here c MD Mayes; Pt mom stated that MD Reddy would like Pt to see cardiolog.       History of Presenting Illness  Jerzy Juan III is a 44 y.o. male who presented 10/23/2021 with blood via his penis.  Patient does have a history of end-stage MS, does have a chronic suprapubic catheter.  Suprapubic catheter gets changed on Fridays, did get change this Friday, today he was noted to have some blood in the tubing.  Patient is alert and oriented but did not speak much during the exam, information obtained from the mother.  She stated she saw a lot of bleeding, decision was made to change the suprapubic catheter again.  Once this was changed she was going to clean him up and then he began having a lot of bleeding out of his penis.  ERP did discuss the case with urology, they did recommend antibiotics.  Patient does have bladder stones, also has a planned procedure on Thursday, urologist on-call stated they would likely not do surgery prior to that since patient needed antibiotics.  I did discuss the case including labs and imaging with the ER physician.    I discussed the plan of care with patient and family.    Review of Systems  Review of Systems "   Constitutional: Positive for malaise/fatigue. Negative for chills and fever.   HENT: Negative for congestion.    Respiratory: Negative for cough, sputum production, shortness of breath and stridor.    Cardiovascular: Negative for chest pain, palpitations and leg swelling.   Gastrointestinal: Positive for abdominal pain and nausea. Negative for constipation, diarrhea and vomiting.   Genitourinary: Negative for dysuria and urgency.   Musculoskeletal: Negative for falls and myalgias.   Neurological: Positive for weakness. Negative for dizziness, tingling, loss of consciousness and headaches.   Psychiatric/Behavioral: Negative for depression and suicidal ideas.   All other systems reviewed and are negative.      Past Medical History   has a past medical history of Anxiety, Back pain, Blood transfusion, Bowel habit changes, Breath shortness, Dental disorder, Depression, Eyes sensitive to light, Fall, Headache(784.0), Heart burn, Heat intolerance, Hypertension, Indigestion, MS (multiple sclerosis) ([facility]) (DX 8/20/2013), Multiple sclerosis ([facility]) (2013), Pain (06/20/2018), Presence of intrathecal baclofen pump, Urinary bladder disorder, and Urinary incontinence.    Surgical History   has a past surgical history that includes open reduction (Left, 1988); gastroscopy (1/14/2017); block epidural steroid injection (N/A, 3/27/2018); cystoscopy (5/7/2018); lasertripsy (5/7/2018); and pump insert/remove (6/26/2018).     Family History  family history includes Hypertension in his father and mother; Psychiatric Illness in his father; Thyroid in his mother.   Family history reviewed with patient. There is no family history that is pertinent to the chief complaint.     Social History   reports that he quit smoking about 8 years ago. He started smoking about 24 years ago. He has a 20.00 pack-year smoking history. He has never used smokeless tobacco. He reports current alcohol use. He reports previous drug use. Drug:  48 yo M pmh depression presents with altered mental status. Admit to ICU for altered mental status, r/o meningitis vs metabolic encephalopathy vs acute hyponatremia vs drug toxicity.     Neuro: cont propofol for sedation   CV: Hemodynamic stable   PULM: vent   GI: NPO for now, PPI ppx   renal: stable  ID: will need LP, s/p failed attempt, Acyclovir/Ceftriaxon/Vanco regimen, Decadorn 10mg IV q6X4 days, f/u Bcx's, consult ID Inhaled.    Allergies  Allergies   Allergen Reactions   • Asa [Aspirin] Unspecified     Bleeding in stomach as a baby   • Gabapentin Anxiety     anger and severe mood swing   • Hydrocodone Nausea   • Methocarbamol      emesis   • Morphine Nausea   • Pregabalin Anxiety     Anger and severe mood swing       Medications  Prior to Admission Medications   Prescriptions Last Dose Informant Patient Reported? Taking?   Acetaminophen (TYLENOL) 325 MG Cap   Yes No   Sig: Take  by mouth 3 times a day as needed.   Ascorbic Acid (VITAMIN C) 1000 MG Tab   Yes No   Sig: Take  by mouth.   Bacillus Coagulans-Inulin (PROBIOTIC) 1-250 BILLION-MG Cap   Yes No   Sig: Probiotic   Citric He-Cbnmyydxngy-Sr Carb (RENACIDIN) Solution   Yes No   Si mL.   Lactobacillus (ULTIMATE PROBIOTIC FORMULA) Cap  MAR from Other Facility Yes No   Sig: Take 1 Cap by mouth 3 times a day.   NON SPECIFIED  MAR from Other Facility Yes No   Si.02 mcg by Intrathecal route 3 times a day as needed. Baclofen Pump with Ketamine   baclofen (LIORESAL) 10 MG Tab   Yes No   Sig: Take 10 mg by mouth 4 times a day.   buPROPion SR (WELLBUTRIN-SR) 100 MG TABLET SR 12 HR  MAR from Other Facility Yes No   Sig: Take 200 mg by mouth 2 times a day.   escitalopram (LEXAPRO) 20 MG tablet   Yes No   Sig: escitalopram 20 mg tablet   Take 1 tablet every day by oral route.   famotidine (PEPCID) 20 MG Tab   Yes No   Sig: Take 20 mg by mouth 2 times a day.   ketamine 50 MG/ML Solution   Yes No   Sig: by Other route one time. Infused with baclofen pump   lidocaine (LIDODERM) 5 % Patch   Yes No   Sig: Lidoderm 5 % topical patch   APPLY 1 PATCH BY TOPICAL ROUTE ONCE DAILY (MAY WEAR UP TO 12HOURS.)   magnesium hydroxide (MILK OF MAGNESIA) 400 MG/5ML Suspension   Yes No   Si mL.   methenamine hip (HIPPREX) 1 GM Tab   Yes No   Sig: methenamine hippurate 1 gram tablet   Take 1 tablet twice a day by oral route for 30 days.   ondansetron (ZOFRAN ODT) 4 MG TABLET DISPERSIBLE   Yes  48 yo M pmh depression presents with altered mental status. Admit to ICU for altered mental status, r/o meningitis vs metabolic encephalopathy vs acute hyponatremia vs drug toxicity.     Neuro: Encephalopathy with un cont propofol for sedation, check methanol level, serum osmolarity, Isopropanol and ethynyl glycol level. amonia level elevated. follow up with ecstasy, canabinoid level.    CV: Hemodynamic stable, follow up with EKG   PULM: vent   GI: NPO for now, PPI ppx   renal: stable  ID: will need LP, s/p failed attempt, Acyclovir/Ceftriaxon/Vanco regimen, Decadorn 10mg IV q6X4 days, f/u Bcx's, consult ID No   Sig: Take 4 mg by mouth every 6 hours as needed for Nausea.   oxybutynin SR (DITROPAN-XL) 10 MG CR tablet   Yes No   Sig: oxybutynin chloride ER 10 mg tablet,extended release 24 hr   Take 1 tablet twice a day by oral route for 30 days.   sertraline (ZOLOFT) 50 MG Tab   Yes No   Sig: Take 25 mg by mouth every day.   vitamin D (CHOLECALCIFEROL) 1000 UNIT Tab  MAR from Other Facility Yes No   Sig: Take 1,000 Units by mouth every day.      Facility-Administered Medications: None       Physical Exam  Temp:  [35.9 °C (96.7 °F)] 35.9 °C (96.7 °F)  Pulse:  [] 101  Resp:  [18-20] 20  BP: (102-132)/(67-93) 132/93  SpO2:  [96 %-97 %] 96 %  Blood Pressure: 132/93   Temperature: 35.9 °C (96.7 °F)   Pulse: (!) 101   Respiration: 20   Pulse Oximetry: 96 %       Physical Exam  Vitals and nursing note reviewed.   Constitutional:       General: He is not in acute distress.     Appearance: He is well-developed. He is ill-appearing. He is not toxic-appearing or diaphoretic.   HENT:      Head: Normocephalic and atraumatic.      Right Ear: External ear normal.      Left Ear: External ear normal.      Nose: Nose normal. No congestion or rhinorrhea.      Mouth/Throat:      Mouth: Mucous membranes are dry.      Pharynx: No oropharyngeal exudate.   Eyes:      General:         Right eye: No discharge.         Left eye: No discharge.      Extraocular Movements: Extraocular movements intact.   Neck:      Trachea: No tracheal deviation.   Cardiovascular:      Rate and Rhythm: Normal rate and regular rhythm.      Heart sounds: No murmur heard.   No friction rub. No gallop.    Pulmonary:      Effort: Pulmonary effort is normal. No respiratory distress.      Breath sounds: Normal breath sounds. No stridor. No wheezing or rales.   Chest:      Chest wall: No tenderness.   Abdominal:      General: Bowel sounds are decreased. There is no distension.      Palpations: Abdomen is soft.      Tenderness: There is generalized abdominal tenderness.       Comments: Baclofen pump in place  Suprapubic cath   Musculoskeletal:         General: No tenderness.      Cervical back: Normal range of motion and neck supple. No edema or erythema.      Right lower leg: No edema.      Left lower leg: No edema.   Lymphadenopathy:      Cervical: No cervical adenopathy.   Skin:     General: Skin is warm and dry.      Findings: No erythema or rash.   Neurological:      Mental Status: He is alert and oriented to person, place, and time.      Cranial Nerves: No cranial nerve deficit.   Psychiatric:         Mood and Affect: Mood normal.         Behavior: Behavior normal.         Thought Content: Thought content normal.         Judgment: Judgment normal.         Laboratory:  Recent Labs     10/23/21  2300   WBC 11.5*   RBC 4.57*   HEMOGLOBIN 13.4*   HEMATOCRIT 41.4*   MCV 90.6   MCH 29.3   MCHC 32.4*   RDW 44.0   PLATELETCT 352   MPV 8.3*     Recent Labs     10/23/21  2300   SODIUM 134*   POTASSIUM 4.4   CHLORIDE 98   CO2 29   GLUCOSE 119*   BUN 16   CREATININE 0.82   CALCIUM 9.2     Recent Labs     10/23/21  2300   ALTSGPT 13   ASTSGOT 11*   ALKPHOSPHAT 84   TBILIRUBIN 0.4   GLUCOSE 119*     Recent Labs     10/23/21  2300   APTT 41.1*   INR 1.09     No results for input(s): NTPROBNP in the last 72 hours.      No results for input(s): TROPONINT in the last 72 hours.    Imaging:  DX-CHEST-PORTABLE (1 VIEW)   Final Result      Tiny left mid lung opacity could represent atelectasis or tiny infiltrate. No pleural effusion.          X-Ray:  I have personally reviewed the images and compared with prior images.    Assessment/Plan:  I anticipate this patient is appropriate for observation status at this time.    Hematuria- (present on admission)  Assessment & Plan  -Currently improved, apparently earlier did have blood in the suprapubic catheter tubing as well as from his penis  -Patient does have a urinary tract infection will be placed on Rocephin  -Continue to closely monitor for  46 yo M pmh depression presents with altered mental status. Admit to ICU for altered mental status, r/o meningitis vs metabolic encephalopathy vs acute hyponatremia vs drug toxicity.     Neuro: Encephalopathy with unclear(toxic vs infectious etiology vs elastolytics ), cont propofol for sedation while intubated, will d/c check methanol level, serum osmolarity, Isopropanol and ethynyl glycol level. ammonia level. follow up with ecstasy, cannabinoid level.  Hypophosphoremia, replete and will close follow up with CMP and phosphorus level.    CV: Hemodynamic stable, follow up with repeat EKG and cardiac enzyme negative.    PULM: wean off vent to NC s/p LP, will continue with monitor   GI: NPO for now, PPI ppx   renal: stable. repeat CMP and monitor gap for acute metabolic acidosis, continue with Art to monitor in& out.  ID: continue with acyclovir/Ceftriaxon/Vanco  Decadorn 10mg IV q6X4 days, f/u Bcx's, and LP result; ID following   Hema: start heparin for DVT PPx  Skin: peripheral IV and art  Disposition: called placed to pt's outpt psych Dr. Deidra Coronel [phone]), waiting for call back. bleeding  -Does have bladder stones  -Will need official urology consultation in the morning    Urinary tract infection- (present on admission)  Assessment & Plan  -Start IV Rocephin  -Start IV fluids  -Not causing sepsis    Hyponatremia- (present on admission)  Assessment & Plan  -Mild, due to dehydration  -Start IV fluids  -Repeat BMP in the morning    Leukocytosis- (present on admission)  Assessment & Plan  -Mild, due to UTI    Recurrent major depressive disorder, in remission ([facility])- (present on admission)  Assessment & Plan  -Continue home meds    MS (multiple sclerosis) ([facility])- (present on admission)  Assessment & Plan  -Severe and chronic, continue baclofen pump      VTE prophylaxis: SCDs/TEDs

## 2021-10-26 ENCOUNTER — APPOINTMENT (OUTPATIENT)
Dept: SURGERY | Facility: CLINIC | Age: 51
End: 2021-10-26
Payer: MEDICAID

## 2021-10-26 VITALS
DIASTOLIC BLOOD PRESSURE: 63 MMHG | HEART RATE: 77 BPM | HEIGHT: 61 IN | OXYGEN SATURATION: 99 % | BODY MASS INDEX: 34.93 KG/M2 | SYSTOLIC BLOOD PRESSURE: 93 MMHG | WEIGHT: 185 LBS

## 2021-10-26 PROCEDURE — 99024 POSTOP FOLLOW-UP VISIT: CPT

## 2021-11-09 ENCOUNTER — APPOINTMENT (OUTPATIENT)
Dept: SURGERY | Facility: CLINIC | Age: 51
End: 2021-11-09
Payer: MEDICAID

## 2021-11-09 VITALS
OXYGEN SATURATION: 97 % | HEIGHT: 61 IN | SYSTOLIC BLOOD PRESSURE: 103 MMHG | WEIGHT: 188 LBS | DIASTOLIC BLOOD PRESSURE: 73 MMHG | BODY MASS INDEX: 35.5 KG/M2 | HEART RATE: 73 BPM

## 2021-11-09 PROCEDURE — 99024 POSTOP FOLLOW-UP VISIT: CPT

## 2021-12-29 NOTE — H&P PST ADULT - LAST STRESS TEST
Denies Rituxan Counseling:  I discussed with the patient the risks of Rituxan infusions. Side effects can include infusion reactions, severe drug rashes including mucocutaneous reactions, reactivation of latent hepatitis and other infections and rarely progressive multifocal leukoencephalopathy.  All of the patient's questions and concerns were addressed.

## 2022-02-25 NOTE — DISCHARGE NOTE PROVIDER - NSDCCPTREATMENT_GEN_ALL_CORE_FT
PRINCIPAL PROCEDURE  Procedure: Abdominal abscess drainage  Findings and Treatment:        [email or web address]

## 2022-07-02 ENCOUNTER — INPATIENT (INPATIENT)
Facility: HOSPITAL | Age: 52
LOS: 9 days | Discharge: HOME | End: 2022-07-12
Attending: PSYCHIATRY & NEUROLOGY | Admitting: PSYCHIATRY & NEUROLOGY

## 2022-07-02 VITALS
RESPIRATION RATE: 16 BRPM | HEIGHT: 78 IN | TEMPERATURE: 98 F | SYSTOLIC BLOOD PRESSURE: 122 MMHG | OXYGEN SATURATION: 100 % | HEART RATE: 85 BPM | DIASTOLIC BLOOD PRESSURE: 88 MMHG

## 2022-07-02 DIAGNOSIS — Z98.890 OTHER SPECIFIED POSTPROCEDURAL STATES: Chronic | ICD-10-CM

## 2022-07-02 LAB
ALBUMIN SERPL ELPH-MCNC: 4.1 G/DL — SIGNIFICANT CHANGE UP (ref 3.3–5)
ALP SERPL-CCNC: 116 U/L — SIGNIFICANT CHANGE UP (ref 40–120)
ALT FLD-CCNC: 67 U/L — HIGH (ref 4–41)
AMPHET UR-MCNC: NEGATIVE — SIGNIFICANT CHANGE UP
ANION GAP SERPL CALC-SCNC: 14 MMOL/L — SIGNIFICANT CHANGE UP (ref 7–14)
APAP SERPL-MCNC: <10 UG/ML — LOW (ref 15–25)
APPEARANCE UR: CLEAR — SIGNIFICANT CHANGE UP
AST SERPL-CCNC: 52 U/L — HIGH (ref 4–40)
B PERT DNA SPEC QL NAA+PROBE: SIGNIFICANT CHANGE UP
B PERT+PARAPERT DNA PNL SPEC NAA+PROBE: SIGNIFICANT CHANGE UP
BARBITURATES UR SCN-MCNC: NEGATIVE — SIGNIFICANT CHANGE UP
BASOPHILS # BLD AUTO: 0.04 K/UL — SIGNIFICANT CHANGE UP (ref 0–0.2)
BASOPHILS NFR BLD AUTO: 0.5 % — SIGNIFICANT CHANGE UP (ref 0–2)
BENZODIAZ UR-MCNC: NEGATIVE — SIGNIFICANT CHANGE UP
BILIRUB SERPL-MCNC: 0.5 MG/DL — SIGNIFICANT CHANGE UP (ref 0.2–1.2)
BILIRUB UR-MCNC: NEGATIVE — SIGNIFICANT CHANGE UP
BORDETELLA PARAPERTUSSIS (RAPRVP): SIGNIFICANT CHANGE UP
BUN SERPL-MCNC: 16 MG/DL — SIGNIFICANT CHANGE UP (ref 7–23)
C PNEUM DNA SPEC QL NAA+PROBE: SIGNIFICANT CHANGE UP
CALCIUM SERPL-MCNC: 9.6 MG/DL — SIGNIFICANT CHANGE UP (ref 8.4–10.5)
CHLORIDE SERPL-SCNC: 100 MMOL/L — SIGNIFICANT CHANGE UP (ref 98–107)
CO2 SERPL-SCNC: 23 MMOL/L — SIGNIFICANT CHANGE UP (ref 22–31)
COCAINE METAB.OTHER UR-MCNC: NEGATIVE — SIGNIFICANT CHANGE UP
COLOR SPEC: SIGNIFICANT CHANGE UP
CREAT SERPL-MCNC: 0.76 MG/DL — SIGNIFICANT CHANGE UP (ref 0.5–1.3)
CREATININE URINE RESULT, DAU: 126 MG/DL — SIGNIFICANT CHANGE UP
DIFF PNL FLD: NEGATIVE — SIGNIFICANT CHANGE UP
EGFR: 110 ML/MIN/1.73M2 — SIGNIFICANT CHANGE UP
EOSINOPHIL # BLD AUTO: 0.51 K/UL — HIGH (ref 0–0.5)
EOSINOPHIL NFR BLD AUTO: 6.7 % — HIGH (ref 0–6)
ETHANOL SERPL-MCNC: <10 MG/DL — SIGNIFICANT CHANGE UP
FLUAV SUBTYP SPEC NAA+PROBE: SIGNIFICANT CHANGE UP
FLUBV RNA SPEC QL NAA+PROBE: SIGNIFICANT CHANGE UP
GLUCOSE SERPL-MCNC: 109 MG/DL — HIGH (ref 70–99)
GLUCOSE UR QL: NEGATIVE — SIGNIFICANT CHANGE UP
HADV DNA SPEC QL NAA+PROBE: SIGNIFICANT CHANGE UP
HCOV 229E RNA SPEC QL NAA+PROBE: SIGNIFICANT CHANGE UP
HCOV HKU1 RNA SPEC QL NAA+PROBE: SIGNIFICANT CHANGE UP
HCOV NL63 RNA SPEC QL NAA+PROBE: SIGNIFICANT CHANGE UP
HCOV OC43 RNA SPEC QL NAA+PROBE: SIGNIFICANT CHANGE UP
HCT VFR BLD CALC: 45.3 % — SIGNIFICANT CHANGE UP (ref 39–50)
HGB BLD-MCNC: 14.8 G/DL — SIGNIFICANT CHANGE UP (ref 13–17)
HMPV RNA SPEC QL NAA+PROBE: SIGNIFICANT CHANGE UP
HPIV1 RNA SPEC QL NAA+PROBE: SIGNIFICANT CHANGE UP
HPIV2 RNA SPEC QL NAA+PROBE: SIGNIFICANT CHANGE UP
HPIV3 RNA SPEC QL NAA+PROBE: SIGNIFICANT CHANGE UP
HPIV4 RNA SPEC QL NAA+PROBE: SIGNIFICANT CHANGE UP
IANC: 4.6 K/UL — SIGNIFICANT CHANGE UP (ref 1.8–7.4)
IMM GRANULOCYTES NFR BLD AUTO: 0.7 % — SIGNIFICANT CHANGE UP (ref 0–1.5)
KETONES UR-MCNC: NEGATIVE — SIGNIFICANT CHANGE UP
LEUKOCYTE ESTERASE UR-ACNC: NEGATIVE — SIGNIFICANT CHANGE UP
LYMPHOCYTES # BLD AUTO: 1.84 K/UL — SIGNIFICANT CHANGE UP (ref 1–3.3)
LYMPHOCYTES # BLD AUTO: 24 % — SIGNIFICANT CHANGE UP (ref 13–44)
M PNEUMO DNA SPEC QL NAA+PROBE: SIGNIFICANT CHANGE UP
MCHC RBC-ENTMCNC: 30 PG — SIGNIFICANT CHANGE UP (ref 27–34)
MCHC RBC-ENTMCNC: 32.7 GM/DL — SIGNIFICANT CHANGE UP (ref 32–36)
MCV RBC AUTO: 91.7 FL — SIGNIFICANT CHANGE UP (ref 80–100)
METHADONE UR-MCNC: NEGATIVE — SIGNIFICANT CHANGE UP
MONOCYTES # BLD AUTO: 0.62 K/UL — SIGNIFICANT CHANGE UP (ref 0–0.9)
MONOCYTES NFR BLD AUTO: 8.1 % — SIGNIFICANT CHANGE UP (ref 2–14)
NEUTROPHILS # BLD AUTO: 4.6 K/UL — SIGNIFICANT CHANGE UP (ref 1.8–7.4)
NEUTROPHILS NFR BLD AUTO: 60 % — SIGNIFICANT CHANGE UP (ref 43–77)
NITRITE UR-MCNC: NEGATIVE — SIGNIFICANT CHANGE UP
NRBC # BLD: 0 /100 WBCS — SIGNIFICANT CHANGE UP
NRBC # FLD: 0 K/UL — SIGNIFICANT CHANGE UP
OPIATES UR-MCNC: NEGATIVE — SIGNIFICANT CHANGE UP
OXYCODONE UR-MCNC: NEGATIVE — SIGNIFICANT CHANGE UP
PCP SPEC-MCNC: SIGNIFICANT CHANGE UP
PCP UR-MCNC: NEGATIVE — SIGNIFICANT CHANGE UP
PH UR: 7.5 — SIGNIFICANT CHANGE UP (ref 5–8)
PLATELET # BLD AUTO: 336 K/UL — SIGNIFICANT CHANGE UP (ref 150–400)
POTASSIUM SERPL-MCNC: 3.8 MMOL/L — SIGNIFICANT CHANGE UP (ref 3.5–5.3)
POTASSIUM SERPL-SCNC: 3.8 MMOL/L — SIGNIFICANT CHANGE UP (ref 3.5–5.3)
PROT SERPL-MCNC: 8 G/DL — SIGNIFICANT CHANGE UP (ref 6–8.3)
PROT UR-MCNC: ABNORMAL
RAPID RVP RESULT: SIGNIFICANT CHANGE UP
RBC # BLD: 4.94 M/UL — SIGNIFICANT CHANGE UP (ref 4.2–5.8)
RBC # FLD: 14.3 % — SIGNIFICANT CHANGE UP (ref 10.3–14.5)
RSV RNA SPEC QL NAA+PROBE: SIGNIFICANT CHANGE UP
RV+EV RNA SPEC QL NAA+PROBE: SIGNIFICANT CHANGE UP
SALICYLATES SERPL-MCNC: <0.3 MG/DL — LOW (ref 15–30)
SARS-COV-2 RNA SPEC QL NAA+PROBE: SIGNIFICANT CHANGE UP
SODIUM SERPL-SCNC: 137 MMOL/L — SIGNIFICANT CHANGE UP (ref 135–145)
SP GR SPEC: 1.02 — SIGNIFICANT CHANGE UP (ref 1–1.05)
THC UR QL: NEGATIVE — SIGNIFICANT CHANGE UP
TOXICOLOGY SCREEN, DRUGS OF ABUSE, SERUM RESULT: SIGNIFICANT CHANGE UP
TSH SERPL-MCNC: 4.52 UIU/ML — HIGH (ref 0.27–4.2)
UROBILINOGEN FLD QL: SIGNIFICANT CHANGE UP
WBC # BLD: 7.66 K/UL — SIGNIFICANT CHANGE UP (ref 3.8–10.5)
WBC # FLD AUTO: 7.66 K/UL — SIGNIFICANT CHANGE UP (ref 3.8–10.5)

## 2022-07-02 PROCEDURE — G1004: CPT

## 2022-07-02 PROCEDURE — 93010 ELECTROCARDIOGRAM REPORT: CPT

## 2022-07-02 PROCEDURE — 70450 CT HEAD/BRAIN W/O DYE: CPT | Mod: 26,ME

## 2022-07-02 PROCEDURE — 99285 EMERGENCY DEPT VISIT HI MDM: CPT | Mod: 25

## 2022-07-02 NOTE — ED PROVIDER NOTE - PROGRESS NOTE DETAILS
Rec'd signout on this pt from NP Jakeew:  50yo M w/ PPH as above, here for bizarre behavior, awaiting [facility] for psych admission.  -Dr. Caputo

## 2022-07-02 NOTE — ED PROVIDER NOTE - OBJECTIVE STATEMENT
50 y/o M  hx Depression  BIB brother  secondary to   sherrie and  paranoia .  Patient presents  expressing a flights of ideas.   Brother states that patient has expressed that he is in the [employer] and [employer] which is not  true . Admits to medication non compliance.    Denies SI/HI/AH/VH. Denies falling, punching or kicking any objects. Denies pain, SOB, fever, chills, chest/abdominal discomfort.  Denies use of alcohol or other illicit drugs. No evidence of physical injures, broken  skin or deformities.

## 2022-07-02 NOTE — ED PROVIDER NOTE - NSICDXPASTMEDICALHX_GEN_ALL_CORE_FT
PAST MEDICAL HISTORY:  Acute appendicitis with generalized peritonitis     Depression     History of Crohn's disease (Stable, no meds)

## 2022-07-02 NOTE — ED ADULT NURSE NOTE - OBJECTIVE STATEMENT
pt. present to  from walk-in triage p/w bizarre behavior at this time. as per triage note, pt. was found to have insomnia, as well as screaming in the streets. pt. inconsistent and poor historian at this time. persistent about asking if writer knows different musical artists, as well as wonder woman. pt. appears manic, not sitting still and making no sense upon interview. no acute distress noted at this time. respirations even and unlabored. VSS as per triage. denies SI/HI, AH/VH/TH, ETOH and Drug use.

## 2022-07-02 NOTE — ED BEHAVIORAL HEALTH NOTE - BEHAVIORAL HEALTH NOTE
SW called pt's brother, Tomasz, at [phone] for collateral information.  Call rang and went to voice mail.  SW left a message requesting a return phone call. MONI called pt's brother, Tomasz, at [phone] for collateral information.  Call rang and went to voice mail.  MONI left a message requesting a return phone call.    Addendum:  MONI called pt's brother Tomasz again at [phone] for collateral information.  Pt's brother reports that pt has been very paranoid in recent weeks.  States he has the belief that the [place] Марина is after his uncle in [place].  He also states that pt has the belief that he and his brothers killed their father, however this is not true.  He states that pt has also been demanding of his mother-states he demands her credit card, uses it to buy various items and has taken cash out on the card, and when pt's mother tells him no, pt becomes very demanding, agitated, and verbally aggressive.  He states that pt does not hit her, but berates her in an aggressive manner.  As per brother, today pt was found to be screaming in the street and not making much sense.  He reports no particular psychiatric history, MONI called pt's brother, Tomasz, at [phone] for collateral information.  Call rang and went to voice mail.  MONI left a message requesting a return phone call.    Addendum:  MONI called pt's brother Tomasz again at [phone] for collateral information.  Pt's brother reports that pt has been very paranoid in recent weeks.  States he has the belief that the [place] Марина is after his uncle in [place].  He also states that pt has the belief that he and his brothers killed their father, however this is not true.  He states that pt has also been demanding of his mother-states he demands her credit card, uses it to buy various items and has taken cash out on the card, and when pt's mother tells him no, pt becomes very demanding, agitated, and verbally aggressive.  He states that pt does not hit her, but berates her in an aggressive manner.  As per brother, today pt was found to be screaming in the street and not making much sense.  He reports no particular psychiatric history, but states that pt's PCP has told him he needs 'psychiatric' help.  Brother states that pt is prescribed sertraline 50 mg and hydroxyzine 10 mg.  Brother reports that pt is not sleeping, calls him all hours of the night, and cannot keep a job as he is not able to relate well with others and often thinks people are out to get him.  Pt's brother also states that pt does not drink or use drugs.  He states that pt lives at home with his mother as well as his brother and his family.  Brother reports that pt's mother is concerned about pt being in the home due to his unpredictable behaviors and escalating aggression towards her.

## 2022-07-02 NOTE — ED PROVIDER NOTE - CLINICAL SUMMARY MEDICAL DECISION MAKING FREE TEXT BOX
50 y/o M  hx Depression    Labs, Urine Tox/UA, EKG. CT head.  Medical evaluation performed. There is no clinical evidence of intoxication or any acute medical problem requiring immediate intervention. Patient is awaiting psychiatric consultation. Final disposition will be determined by psychiatrist.

## 2022-07-02 NOTE — ED ADULT TRIAGE NOTE - CHIEF COMPLAINT QUOTE
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

## 2022-07-02 NOTE — ED ADULT NURSE NOTE - CHIEF COMPLAINT QUOTE
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

## 2022-07-02 NOTE — ED BEHAVIORAL HEALTH NOTE - BEHAVIORAL HEALTH NOTE
MD called pt's brother, Tomasz ([phone]) for collateral information.    Call rang and went to voice mail.  MD left a message requesting a return phone call. MD called pt's brotherTomasz ([phone]) for collateral information.    Call rang and went to voice mail.  MD left a message requesting a return phone call.      Patient's brother returned call. Answered COVID exposure screen below:    COVID Exposure Screen- collateral (i.e. third-party, chart review, belongings, etc; include EMS and ED staff)     1.        *Has the patient had a COVID-19 test in the last 21 days?  (  ) Yes   (x) No   (  ) Unknown- Reason: ______  IF YES PROCEED TO QUESTION #2. IF NO OR UNKNOWN THEN PLEASE SKIP TO QUESTION #3.  2.        Date of test: ________  3.        Do you know the result? (  ) Negative   (  ) Positive   (  ) No result available  4.        *In the past 14 days, has the patient been around anyone with a positive COVID-19 test?*  (  ) Yes   (x) No   (  ) Unknown- Reason (e.g. collateral uncertain, refusing to answer, etc.):  ______  IF YES PROCEED TO QUESTION #5. IF NO or UNKNOWN, PLEASE SKIP TO QUESTION #10  5.        Was the patient within 6 feet of them for at least 15 minutes? (  ) Yes   (  ) No   (  ) Unknown- Reason: ______   6.        Did the patient provide care for them? (  ) Yes   (  ) No   (  ) Unknown- Reason: ______   7.        Did the patient have direct physical contact with them (touched, hugged, or kissed them)? (  ) Yes   (  ) No    (  ) Unknown- Reason: ______   8.        Did the patient share eating or drinking utensils with them? (  ) Yes   (  ) No    (  ) Unknown- Reason: ______   9.        Have they sneezed, coughed, or somehow got respiratory droplets on the patient? (  ) Yes   (  ) No    (  ) Unknown- Reason: ______   10.     *Have you been out of [place] within the past 14 days?*  (  ) Yes   ( x ) No   (  ) Unknown- Reason (e.g. patient uncertain, sedated, refusing to answer, etc.): _______  IF YES PLEASE ANSWER THE FOLLOWING QUESTIONS:  11.     Which state/country have you been to? ______  12.     Were you there over 24 hours? (  ) Yes   (  ) No    (  ) Unknown- Reason: ______  13.     Date of return to [facility]: ______

## 2022-07-02 NOTE — ED PROVIDER NOTE - NS_EDPROVIDERDISPOUSERTYPE_ED_A_ED
Ashia Vidales Patient Age: 27 year old  MESSAGE:       Patient calling stating having UTI symptoms, for a month now, patient saw an online dr, and symptoms went away and came back. No availability at [facility], states that location is the closest to her.    Message confirmed with caller.     Call connected to  OB Triage Queue.  Routed to provider's clinical pool.     WEIGHT AND HEIGHT:   Wt Readings from Last 1 Encounters:   07/01/17 53.1 kg (117 lb)     Ht Readings from Last 1 Encounters:   07/01/17 5' 4\" (1.626 m)     BMI Readings from Last 1 Encounters:   07/01/17 20.08 kg/m²       ALLERGIES: not on file.  No current outpatient medications on file.     No current facility-administered medications for this visit.      PHARMACY to use:           Pharmacy preference(s) on file: No Pharmacies Listed    CALL BACK INFO: Ok to leave response (including medical information) on answering machine  ROUTING: Patient's physician/staff        PCP: Verify Pcp         INS: No billing information found for this encounter.   PATIENT ADDRESS:  [address] [place] [address]   I have personally evaluated and examined the patient. The Attending was available to me as a supervising provider if needed.

## 2022-07-03 DIAGNOSIS — F29 UNSPECIFIED PSYCHOSIS NOT DUE TO A SUBSTANCE OR KNOWN PHYSIOLOGICAL CONDITION: ICD-10-CM

## 2022-07-03 LAB
COVID-19 SPIKE DOMAIN AB INTERP: POSITIVE
COVID-19 SPIKE DOMAIN ANTIBODY RESULT: >250 U/ML — HIGH
SARS-COV-2 IGG+IGM SERPL QL IA: >250 U/ML — HIGH
SARS-COV-2 IGG+IGM SERPL QL IA: POSITIVE

## 2022-07-03 PROCEDURE — 71110 X-RAY EXAM RIBS BIL 3 VIEWS: CPT | Mod: 26

## 2022-07-03 PROCEDURE — 99222 1ST HOSP IP/OBS MODERATE 55: CPT

## 2022-07-03 PROCEDURE — 99285 EMERGENCY DEPT VISIT HI MDM: CPT | Mod: GC

## 2022-07-03 RX ORDER — RISPERIDONE 4 MG/1
0.5 TABLET ORAL AT BEDTIME
Refills: 0 | Status: DISCONTINUED | OUTPATIENT
Start: 2022-07-03 | End: 2022-07-04

## 2022-07-03 RX ORDER — HALOPERIDOL DECANOATE 100 MG/ML
5 INJECTION INTRAMUSCULAR ONCE
Refills: 0 | Status: COMPLETED | OUTPATIENT
Start: 2022-07-03 | End: 2022-07-03

## 2022-07-03 RX ORDER — HALOPERIDOL DECANOATE 100 MG/ML
2 INJECTION INTRAMUSCULAR EVERY 6 HOURS
Refills: 0 | Status: DISCONTINUED | OUTPATIENT
Start: 2022-07-03 | End: 2022-07-07

## 2022-07-03 RX ORDER — HALOPERIDOL DECANOATE 100 MG/ML
2 INJECTION INTRAMUSCULAR ONCE
Refills: 0 | Status: DISCONTINUED | OUTPATIENT
Start: 2022-07-03 | End: 2022-07-07

## 2022-07-03 RX ADMIN — Medication 2 MILLIGRAM(S): at 03:12

## 2022-07-03 RX ADMIN — RISPERIDONE 0.5 MILLIGRAM(S): 4 TABLET ORAL at 21:01

## 2022-07-03 RX ADMIN — HALOPERIDOL DECANOATE 5 MILLIGRAM(S): 100 INJECTION INTRAMUSCULAR at 03:11

## 2022-07-03 NOTE — BH INPATIENT PSYCHIATRY ASSESSMENT NOTE - HPI (INCLUDE ILLNESS QUALITY, SEVERITY, DURATION, TIMING, CONTEXT, MODIFYING FACTORS, ASSOCIATED SIGNS AND SYMPTOMS)
Patient was seen and evaluated, chart reviewed. Case discussed with nursing team.  On service for this 51 year old man, domiciled with mother, unemployed. Patient has PPHx of depression and no hx of prior hospitalizations.  Patient is brought in by his brother.  Patient is hospitalized with a primary problem of worsening mood, paranoia and insomnia. Patient admitted to [facility] on a 9.39 legal status. I have reviewed the initial psychiatric assessment in the electronic medical record, including the history of present illness, past psychiatric history, family/social history (no pertinent changes), and exam, and have confirmed the salient findings dated 7/3/22.  As per chart review, transferring records indicated the following:  The patient is a 51 year old man, domiciled with mother, unemployed, PHx of depression (prescribed Sertraline 50mg and Hydroxyzine 10mg), no hx of hospitalizations, no substance use, no history of SA or NSSIB, PMHx of GERD, brought in by brother for worsening paranoia and insomnia.  On assessment the patient is difficult to understand and unable to give a full history. He asks odd questions about "wonder woman," and then jump up from his bed to imitate a soldier's march. He does not know why he is in the hospital. He says one night last week he only slept 1 hour. Denies SI/HI/AH/VH. When asked about hearing voices or hallucinations he response by explaining how loud his own voice is. He jumps from one topic to the next very quickly and is difficult to follow. He talks about his girlfriend who works in the hospital, which his brother confirms is not true.  Collateral information received from patient's brother, Tomasz ([phone]):  Per  note:  Pt's brother reports that pt has been very paranoid in recent weeks.  States he has the belief that the [place] Марина is after his uncle in [place].  He also states that pt has the belief that he and his brothers killed their father, however this is not true.  He states that pt has also been demanding of his mother-states he demands her credit card, uses it to buy various items and has taken cash out on the card, and when pt's mother tells him no, pt becomes very demanding, agitated, and verbally aggressive.  He states that pt does not hit her, but berates her in an aggressive manner.  As per brother, today pt was found to be screaming in the street and not making much sense.  He reports no particular psychiatric history, but states that pt's PCP has told him he needs 'psychiatric' help.  Brother states that pt is prescribed sertraline 50 mg and hydroxyzine 10 mg.  Brother reports that pt is not sleeping, calls him all hours of the night, and cannot keep a job as he is not able to relate well with others and often thinks people are out to get him.  Pt's brother also states that pt does not drink or use drugs.  He states that pt lives at home with his mother as well as his brother and his family.  Brother reports that pt's mother is concerned about pt being in the home due to his unpredictable behaviors and escalating aggression towards her.  Brother contacted by MD writer for further information:  He reports the patient has a history of GERD and was taking omeprazole in the past. He also reports that the patient has exhibited behaviors like this before, when their father  in September of last year. He reports then the patient began acting weird and not making sense, which is when his doctor began treating him psychiatrically. He is worried for the safety of their mother as she is disabled.    On unit: information received from: Chart review and patient interview  Patient is followed up for psychotic decompensation, bizarre behaviors, and paranoia. Patient is observed in interview room.  He is notably bizarre, oddly related.   Patient paranoid and suspicious.  When questioned about his mood pt reports “fine”  He denies SI/SIB/HI, reports no plan or intent, and engages in safety planning.   Attempted to discuss precipitating events leading to current admission and why he is here, however patient unable to give details.  Patient reports he had multiple falls prior to coming into hospital, reports most recent fall “on Monday” a few days ago and reports having pain to lower back and flank pain. Reports pain when taking deep breaths. Discussed with nursing team to send pt for xray  During interview patient presents as psychotic, with disorganized speech and delusional thoughts of  history, paranoia. Unable to fully participate in meaningful interview due to severity of psychotic/disorganized and paranoid symptoms. Interview limited.  Patient denies AVH, paranoia, or delusions.  Appears with perceptual disturbances. Although patient denies symptoms suggestive of sherrie: (denies grandiosity, racing thoughts and increased goal directed activities or pressured speech, and elevated mood/ denied any increased in energy level causing sleep disruption), there is concern for possible sherrie. No signs of catatonia.  He denies history of violence, denies access to firearm. Denies legal issues, and denies any substance abuse, No past trauma. PMH: GERD (not one medications)  Risks and benefits of medications reviewed with patient and patient consented to current medication regimen: Risperdal 0.5mg qhs  The following Diagnoses are based on currently available information and may change as additional information. Patient presents with five (or more) diagnostic criteria to support the following diagnosis.  Psychiatric Diagnosis: Primary Dx Psychosis, unspecified psychosis type F29.   Differential:  Bipolar disorder (longstanding, undiagnosed) vs MDD w/ psychotic features

## 2022-07-03 NOTE — ED BEHAVIORAL HEALTH ASSESSMENT NOTE - NSSUICRSKFACTOR_PSY_ALL_CORE
SS note 

SS requested for homelessness. Pt is a 54-year-old,  male. Pt 
is alert and oriented x4. Pt presents irritable. SW met with pt at his bedside 
in the emergency department. Per chart, pt was denied by [facility] for psychiatric admission. SW discussed D/C plans with the pt. Pt 
plans to be D/C to his prior living arrangement on the street. Pt stated that 
he is able to use public transportation independently and was provided with a 
TAP card. SW offered the pt homeless resources. Pt accepted the resources. Pt 
refused to sign the homeless waiver, and SW filed waiver in the pt's chart. No 
further SS intervention at this time, however, SW will remain available as 
needed. Presenting Symptoms/Treatment Related Factors/Activating Events/Stressors

## 2022-07-03 NOTE — ED BEHAVIORAL HEALTH ASSESSMENT NOTE - ABUSE / TRAUMA HISTORY
Render Note In Bullet Format When Appropriate: No Duration Of Freeze Thaw-Cycle (Seconds): 0 Show Applicator Variable?: Yes Detail Level: Detailed Consent: The patient's consent was obtained including but not limited to risks of crusting, scabbing, blistering, scarring, darker or lighter pigmentary change, recurrence, incomplete removal and infection. Post-Care Instructions: I reviewed with the patient in detail post-care instructions. Patient is to wear sunprotection, and avoid picking at any of the treated lesions. Pt may apply Vaseline to crusted or scabbing areas. No

## 2022-07-03 NOTE — ED BEHAVIORAL HEALTH ASSESSMENT NOTE - SUICIDALITY
Pt lost pulses and went into PEA, 1 round of compressions were done and 1 epi given         Ailin Pace RN  07/18/19 8857 No

## 2022-07-03 NOTE — BH PATIENT PROFILE - HOME MEDICATIONS
ibuprofen 600 mg oral tablet , 1 tab(s) orally every 6 hours, As Needed   Tylenol Extra Strength 500 mg oral tablet , 2 tab(s) orally every 8 hours, As Needed   oxyCODONE 5 mg oral tablet , 1 tab(s) orally every 6 hours, As Needed MDD:4   neomycin 500 mg oral tablet , 1 tab(s) orally 3 times - Take orally at 1pm, 3pm and 9 pm the day prior to surgery  MoviPrep oral powder for reconstitution , 1 packet(s) orally once - Mix powder in water as directed and drink starting at 4pm the day prior to surgery until finished  sertraline 50 mg oral tablet , 1 tab(s) orally once a day

## 2022-07-03 NOTE — BH INPATIENT PSYCHIATRY ASSESSMENT NOTE - NSBHASSESSSUMMFT_PSY_ALL_CORE
On service for this 51 year old man, domiciled with mother, unemployed. Patient has PPHx of depression and no hx of prior hospitalizations.  Patient is brought in by his brother.  Patient is hospitalized with a primary problem of worsening mood, paranoia and insomnia. Patient admitted to [facility] on a 9.39 legal status.   Plan:  >Legal: 9.39  >Obs: Routine, no current SI. no need for CO, patient not expected to pose risk to self or others in controlled inpatient setting  >Psychiatric Meds: Risperdal 0.5mg qhs  PRN medications:  Ativan 2mg oral Q6HR PRN for agitation and anxiety.  Haldol 5mg oral Q6HR PRN for agitation.   Benadryl 50mg oral Q6HR PRN for agitation.   Vistaril 50mg oral Q6HR PRN for anxiety.  Desyrel 50mg oral QHS PRN for insomnia.   >Labs: Admission labs reviewed, no acute findings. Labs pending for tomorrow: A1c and Lipid panel. Hold antipsychotics if QTc >500  >Medical:   No acute concerns. No consultations needed at this time. No indication for CIWA. Patient with consistently stable VS, no visible physical symptoms of withdrawal.   During the course of treatment, will collaborate with medical team to manage medical issues.  >Diet: Regular  >Social: milieu/structured therapy  >Treatment Interventions: Groups and Individual Therapy/CBT, Motivational counseling for substance abuse related issues.   >Dispo: Collateral and dispo planning pending further symptom and medication optimization

## 2022-07-03 NOTE — ED BEHAVIORAL HEALTH ASSESSMENT NOTE - NS ED BHA HOMICIDALITY PRESENT AGGRESSION OTHERS PAST MONTH
Please let pt know that her A1C is much better than last time but still high. Dr Izell Collet and I would like for her to start Metformin 500mg po daily with breakfast. After 2 weeks she should increase the dose to BID with meals. This will make her body more sensitive to the insulin to get her blood sugars down further. Most common side effects are diarrhea and/or abdominal pain. She can return in 3 months to recheck A1C. Yes

## 2022-07-03 NOTE — BH INPATIENT PSYCHIATRY ASSESSMENT NOTE - CURRENT MEDICATION
MEDICATIONS  (STANDING):  risperiDONE   Tablet 0.5 milliGRAM(s) Oral at bedtime    MEDICATIONS  (PRN):  haloperidol     Tablet 2 milliGRAM(s) Oral every 6 hours PRN agitation  haloperidol    Injectable 2 milliGRAM(s) IntraMuscular once PRN Agitation  LORazepam     Tablet 2 milliGRAM(s) Oral every 6 hours PRN Agitation  LORazepam   Injectable 2 milliGRAM(s) IntraMuscular once PRN Agitation

## 2022-07-03 NOTE — BH INPATIENT PSYCHIATRY ASSESSMENT NOTE - NS ED BHA SUICIDALITY PRESENT CURRENT PASSIVE IDEATION
-- DO NOT REPLY / DO NOT REPLY ALL --  -- Message is from the [facility]--    COVID-19 Universal Screening: Negative    Transfer to RN      Chief Complaint:  Abdominal Pain    Caller Information       Type Contact Phone    05/11/2020 09:41 AM Phone (Incoming) PastorajoybhavikJosephine velásquez (Self) [phone] (H)          Provider Name:  New patient    [facility] [place]  [address] [place] [address]    Alternative Phone Number:  [phone]    
No

## 2022-07-03 NOTE — ED BEHAVIORAL HEALTH NOTE - BEHAVIORAL HEALTH NOTE
SW called [facility] [phone], spoke with after hours rep Rockwell, to notify [facility] of pt admission to [facility]. SW called [facility] [phone], spoke with after hours rep Luli, to notify [facility] of pt admission to [facility].  As per Luli, she is having difficulty reaching a behavioral health representative at this time.  Will need to follow up in the AM for notification of admission.

## 2022-07-03 NOTE — ED BEHAVIORAL HEALTH ASSESSMENT NOTE - DESCRIPTION
Unemployed, moved from [place] 25-30 years ago Patient calm and cooperative in the ED. No PRN medications required.    Vital Signs Last 24 Hrs  T(C): 36.7 (02 Jul 2022 20:37), Max: 36.7 (02 Jul 2022 20:37)  T(F): 98 (02 Jul 2022 20:37), Max: 98 (02 Jul 2022 20:37)  HR: 85 (02 Jul 2022 20:37) (85 - 85)  BP: 122/88 (02 Jul 2022 20:37) (122/88 - 122/88)  BP(mean): --  RR: 16 (02 Jul 2022 20:37) (16 - 16)  SpO2: 100% (02 Jul 2022 20:37) (100% - 100%) GERD; hx of AMS in the setting of elevated ammonia level; hx of complicated appendicitis

## 2022-07-03 NOTE — ED ADULT NURSE REASSESSMENT NOTE - NS ED NURSE REASSESS COMMENT FT1
pt. pending CTr to be transferred to [facility]. pt. refusing to be transferred, states "before I go, I have to go to [facility] at 0730. Call my other brother." MD Caputo aware. pRN orders placed.

## 2022-07-03 NOTE — BH INPATIENT PSYCHIATRY ASSESSMENT NOTE - NSBHMETABOLIC_PSY_ALL_CORE_FT
BMI: BMI (kg/m2): 10.9 (07-03-22 @ 04:32)  HbA1c:   Glucose:   BP: 110/70 (07-03-22 @ 04:32) (104/74 - 122/88)  Lipid Panel:

## 2022-07-03 NOTE — ED ADULT NURSE REASSESSMENT NOTE - NS ED NURSE REASSESS COMMENT FT1
pt. remains calm, cooperative and redirectable. no acute distress noted. respirations even and unlabored. pt. sitting calmly in the  Hallway.

## 2022-07-03 NOTE — ED BEHAVIORAL HEALTH ASSESSMENT NOTE - SUMMARY
The patient is a 51 year old man, domiciled with mother, unemployed, PHx of depression (prescribed Sertraline 50mg and Hydroxyzine 10mg), no hx of hospitalizations, no substance use, no history of SA or NSSIB, PMHx of GERD, brought in by brother for worsening paranoia and insomnia.    The patient presents as acutely psychotic, with disorganized speech and delusional thoughts of  history, paranoia, and romantic relationships. While he is unable to provide any meaningful history, his brother reports similar odd behavior was seen after the death of their father last year. His brother reports the patient is noncompliant with prescribed SSRI medications, and has been having decreased sleep, calling him at all hours of the night. He has not been able to keep a job during this time. His brother also notes that the patient has been spending excess money, and is verbally aggressive with his mother when his behavior has been brought up, noting a concern for her safety.    Of note, the patient has had an episode of AMS in 2018 in the setting of elevated ammonia level. The exact cause was not elucidated in the documentation, but AMS resolved in the hospital after treatment with no further exacerbations and medical workup. Labs today within normal limits, low concern for metabolic etiology of current presentation.    Given the patient's psychotic symptoms in the setting of psychosocial stressors, and concern for the safety of the patient's disable mother at home, patient meets criteria for involuntary hospitalization at this time.    Plan:  - admit to L3 on 9.39 status  - d/c patient's sertraline due to concern for possible sherrie  - start risperdal 0.5 mg qHS (low dose as patient is antipsychotic naive)      -uptitrate as clinically indicated

## 2022-07-03 NOTE — BH INPATIENT PSYCHIATRY ASSESSMENT NOTE - NSBHCHARTREVIEWVS_PSY_A_CORE FT
Vital Signs Last 24 Hrs  T(C): 36.5 (07-03-22 @ 04:32), Max: 36.7 (07-02-22 @ 20:37)  T(F): 97.7 (07-03-22 @ 04:32), Max: 98 (07-02-22 @ 20:37)  HR: 72 (07-03-22 @ 04:32) (62 - 85)  BP: 110/70 (07-03-22 @ 04:32) (104/74 - 122/88)  BP(mean): --  RR: 18 (07-03-22 @ 04:32) (16 - 18)  SpO2: 95% (07-03-22 @ 04:00) (95% - 100%)

## 2022-07-03 NOTE — BH INPATIENT PSYCHIATRY ASSESSMENT NOTE - RISK ASSESSMENT
Patient is at increased risk for dangerous behaviors, but not imminent/acute risk.  RF: psychosis, treatment noncompliance, activating events  PF: supportive family, stable housing, no history of SA or NSSIB, future oriented, denies SI

## 2022-07-03 NOTE — BH INPATIENT PSYCHIATRY ASSESSMENT NOTE - NSBHATTESTAPPBILLTIME_PSY_A_CORE
I attest my time as PALOMA is greater than 50% of the total combined time spent on qualifying patient care activities. I have reviewed and verified the documentation.

## 2022-07-03 NOTE — ED BEHAVIORAL HEALTH ASSESSMENT NOTE - OTHER PAST PSYCHIATRIC HISTORY (INCLUDE DETAILS REGARDING ONSET, COURSE OF ILLNESS, INPATIENT/OUTPATIENT TREATMENT)
Homecare was out to see patient today  Has concerns with cloudy, discolored, and foul smelling urine  Would like a verbal order for a UA/C&S   ok to give? History of bizarre behavior and possible depression after the death of his father

## 2022-07-03 NOTE — ED BEHAVIORAL HEALTH ASSESSMENT NOTE - NSBHATTESTBILLING_PSY_A_CORE
99285-Emergency department visit - high complexity 18003-Tmuueepqtus diagnostic evaluation with medical services

## 2022-07-03 NOTE — ED BEHAVIORAL HEALTH ASSESSMENT NOTE - NSBHATTESTCOMMENTATTENDFT_PSY_A_CORE
The patient is a 51 year old man, domiciled with mother, unemployed, PHx of depression (prescribed Sertraline 50mg and Hydroxyzine 10mg), no hx of hospitalizations, no substance use, no history of SA or NSSIB, PMHx of GERD, brought in by brother for worsening paranoia and insomnia.    The patient presents as acutely psychotic, with disorganized speech and delusional thoughts of  history, paranoia, and romantic relationships. While he is unable to provide any meaningful history, his brother reports similar odd behavior was seen after the death of their father last year. His brother reports the patient is noncompliant with prescribed SSRI medications, and has been having decreased sleep, calling him at all hours of the night. He has not been able to keep a job during this time. His brother also notes that the patient has been spending excess money, and is verbally aggressive with his mother when his behavior has been brought up, noting a concern for her safety.    Of note, the patient has had an episode of AMS in 2018 in the setting of elevated ammonia level. The exact cause was not elucidated in the documentation, but AMS resolved in the hospital after treatment with no further exacerbations and medical workup. Labs today within normal limits, low concern for metabolic etiology of current presentation.    Given the patient's psychotic symptoms in the setting of psychosocial stressors, and concern for the safety of the patient's disable mother at home, patient meets criteria for involuntary hospitalization at this time.

## 2022-07-03 NOTE — ED BEHAVIORAL HEALTH ASSESSMENT NOTE - RISK ASSESSMENT
Patient is at increased risk for dangerous behaviors, but not imminent/acute risk.  RF: psychosis, treatment noncompliance, activating events  PF: supportive family, stable housing, no history of SA or NSSIB, future oriented, denies SI Low Acute Suicide Risk

## 2022-07-03 NOTE — ED BEHAVIORAL HEALTH ASSESSMENT NOTE - DETAILS
Discussed w/ patient's brother jenna n/a discussed w/ DYLLAN Dr. Sanderson Verbally aggressive towards mother; no violence

## 2022-07-03 NOTE — ED ADULT NURSE REASSESSMENT NOTE - NS ED NURSE REASSESS COMMENT FT1
RN Note 2:50am- Pt hyperverbal and demanding from author to be discharged.. NOW! Pt states he "must go to [facility] in the morning at 7am" and if he does not get out now he will "break the doors down". Pt reminded that he was admitted and will be going to his inpatient bed with EMS but states he "will run away". Pt medicated as ordered.  RN Note 3:55am- Pt sleeping, respirations even and non labored, reassessment vitals as noted.

## 2022-07-04 PROCEDURE — 99232 SBSQ HOSP IP/OBS MODERATE 35: CPT

## 2022-07-04 RX ORDER — RISPERIDONE 4 MG/1
1.5 TABLET ORAL AT BEDTIME
Refills: 0 | Status: DISCONTINUED | OUTPATIENT
Start: 2022-07-04 | End: 2022-07-05

## 2022-07-04 RX ADMIN — RISPERIDONE 1.5 MILLIGRAM(S): 4 TABLET ORAL at 20:21

## 2022-07-04 NOTE — BH INPATIENT PSYCHIATRY PROGRESS NOTE - NSBHCHARTREVIEWVS_PSY_A_CORE FT
Vital Signs Last 24 Hrs  T(C): 36.3 (07-03-22 @ 19:18), Max: 36.3 (07-03-22 @ 19:18)  T(F): 97.3 (07-03-22 @ 19:18), Max: 97.3 (07-03-22 @ 19:18)  HR: 94 (07-03-22 @ 19:18) (94 - 94)  BP: 115/66 (07-03-22 @ 19:18) (115/66 - 115/66)  BP(mean): --  RR: --  SpO2: --

## 2022-07-04 NOTE — BH INPATIENT PSYCHIATRY PROGRESS NOTE - NSBHASSESSSUMMFT_PSY_ALL_CORE
On service for this 51 year old man, domiciled with mother, unemployed. Patient has PPHx of depression and no hx of prior hospitalizations.  Patient is brought in by his brother.  Patient is hospitalized with a primary problem of worsening mood, paranoia and insomnia. Patient admitted to [facility] on a 9.39 legal status.   Plan:  >Legal: 9.39  >Obs: Routine, no current SI. no need for CO, patient not expected to pose risk to self or others in controlled inpatient setting  >Psychiatric Meds: Risperdal 1mg qhs  PRN medications:  Ativan 2mg oral Q6HR PRN for agitation and anxiety.  Haldol 5mg oral Q6HR PRN for agitation.   Benadryl 50mg oral Q6HR PRN for agitation.   Vistaril 50mg oral Q6HR PRN for anxiety.  Desyrel 50mg oral QHS PRN for insomnia.   >Labs: Admission labs reviewed, no acute findings. Labs pending for tomorrow: A1c and Lipid panel. Hold antipsychotics if QTc >500  >Medical:   No acute concerns. No consultations needed at this time. No indication for CIWA. Patient with consistently stable VS, no visible physical symptoms of withdrawal.   During the course of treatment, will collaborate with medical team to manage medical issues.  >Diet: Regular  >Social: milieu/structured therapy  >Treatment Interventions: Groups and Individual Therapy/CBT, Motivational counseling for substance abuse related issues.   >Dispo: Collateral and dispo planning pending further symptom and medication optimization

## 2022-07-04 NOTE — PSYCHIATRIC REHAB INITIAL EVALUATION - NSBHPRRECOMMEND_PSY_ALL_CORE
Writer met with patient to orient to unit and introduce self, psychiatric rehabilitation staff and department functions. Writer encouraged patient to attend psychiatric rehabilitation groups and engage in treatment. Pt is a 51 year old male, domiciled with his mother. Pt is unemployed and presents to [facility] with a PPHx of depression and no hx of prior hospitalizations. Pt presents to [facility] with a primary concern of worsening mood, paranoia, and insomnia. However on arrival to unit, pt seems to present with manic symptoms, smiling and laughing inappropriately, elevated, euphoric, pressured, and loud. Pt brought in by his brother. Pt demonstrating daily medication compliance on unit. Pt observed intermittently in the community. Pt seems to be full of energy and is asking odd questions in the context of where different celebrities live etc. Pt presents as flirty and elevated with poor boundaries. Pt attending some groups intermittently, however does not seem to be able to tolerate a full group at this time. Writer and patient were able to establish a collaborative psychiatric rehabilitation goal. Pt denies SI/HI/I/P and AH/VH/TH.  Psychiatric Rehabilitation staff will continue to engage patient daily in order to develop therapeutic rapport. Will continue to support the pt during the current stay.

## 2022-07-04 NOTE — BH INPATIENT PSYCHIATRY PROGRESS NOTE - NSBHFUPINTERVALHXFT_PSY_A_CORE
Patient was seen and evaluated, chart reviewed. Case discussed with nursing team.  On service for this 51 year old man, domiciled with mother, unemployed. Patient has PPHx of depression and no hx of prior hospitalizations.  Patient is brought in by his brother.  Patient is hospitalized with a primary problem of worsening mood, paranoia and insomnia. Patient admitted to [facility] on a 9.39 legal status. I have reviewed the initial psychiatric assessment in the electronic medical record, including the history of present illness, past psychiatric history, family/social history (no pertinent changes), and exam, and have confirmed the salient findings dated 7/3/22.  Patient is followed up for psychotic decompensation, bizarre behaviors, and paranoia. Chart, medications and labs reviewed.  Patient is discussed with nursing staff. No interval issues.  Per nursing report patient remains compliant with all standing medications and tolerating well, denies any SE. Increase Risperdal 1.5mg qhs. Patient remains in fair behavioral control, no aggression, no prns for aggression. Patient offers no complaints.    Patient is observed in hallway, talking loudly, psychotic rambling, nonsensical. He denies SI/SIB/HI, reports no plan or intent, and engages in safety planning. During interview patient presents as psychotic, with disorganized speech and delusional thoughts, paranoia. Unable to fully participate in meaningful interview due to severity of psychotic/disorganized and paranoid symptoms. Interview limited, patient is dancing during interview. Patient denies AVH, sherrie, paranoia, or delusions.  Appears with perceptual disturbances. Self- care remains adequate.  No acute medical issues, VSS.  Patient denies flank pain, was sent for xray yesterday, resulted unremarkable.  Continue to monitor and provide therapeutic support.

## 2022-07-05 LAB — SARS-COV-2 RNA SPEC QL NAA+PROBE: SIGNIFICANT CHANGE UP

## 2022-07-05 PROCEDURE — 99233 SBSQ HOSP IP/OBS HIGH 50: CPT

## 2022-07-05 RX ORDER — RISPERIDONE 4 MG/1
2 TABLET ORAL AT BEDTIME
Refills: 0 | Status: DISCONTINUED | OUTPATIENT
Start: 2022-07-05 | End: 2022-07-08

## 2022-07-05 RX ORDER — PANTOPRAZOLE SODIUM 20 MG/1
40 TABLET, DELAYED RELEASE ORAL
Refills: 0 | Status: DISCONTINUED | OUTPATIENT
Start: 2022-07-05 | End: 2022-07-12

## 2022-07-05 RX ADMIN — Medication 2 MILLIGRAM(S): at 11:39

## 2022-07-05 RX ADMIN — HALOPERIDOL DECANOATE 2 MILLIGRAM(S): 100 INJECTION INTRAMUSCULAR at 11:39

## 2022-07-05 RX ADMIN — RISPERIDONE 2 MILLIGRAM(S): 4 TABLET ORAL at 20:13

## 2022-07-05 NOTE — BH INPATIENT PSYCHIATRY PROGRESS NOTE - CURRENT MEDICATION
MEDICATIONS  (STANDING):  pantoprazole    Tablet 40 milliGRAM(s) Oral before breakfast  risperiDONE   Tablet 2 milliGRAM(s) Oral at bedtime    MEDICATIONS  (PRN):  haloperidol     Tablet 2 milliGRAM(s) Oral every 6 hours PRN agitation  haloperidol    Injectable 2 milliGRAM(s) IntraMuscular once PRN Agitation  LORazepam     Tablet 2 milliGRAM(s) Oral every 6 hours PRN Agitation  LORazepam   Injectable 2 milliGRAM(s) IntraMuscular once PRN Agitation

## 2022-07-05 NOTE — BH CHART NOTE - NSEVENTNOTEFT_PSY_ALL_CORE
: Joyce (ID [id number])    Spoke with pt's brother Pedro and mother Evelina (phone: [phone]). Pt's family endorsed that the reason the pt was brought in was because he was "getting angry easily" and "threatening family". Family endorsed that pt has periods of "many days" where pt will not sleep, walk around the house, call family members in the middle of the night, and spend a lot of money gambling. Family also endorses that during these time periods pt complains that family is "out to get" him. Family endorses that pt has exhibited these symptoms many times in the past and states that they installed cameras in his mother's home out of concern about his symptoms. Family endorses that approximately two weeks ago the patient fell, that he did not hit his head, and that this was his first incidence of a fall. Questions and concerns of family about patient's treatment were discussed. Will continue to update the family on patient status.

## 2022-07-05 NOTE — BH INPATIENT PSYCHIATRY PROGRESS NOTE - NSBHCHARTREVIEWVS_PSY_A_CORE FT
Vital Signs Last 24 Hrs  T(C): 36.4 (07-05-22 @ 08:24), Max: 36.6 (07-04-22 @ 19:06)  T(F): 97.6 (07-05-22 @ 08:24), Max: 97.8 (07-04-22 @ 19:06)  HR: 91 (07-04-22 @ 19:06) (91 - 91)  BP: 130/89 (07-04-22 @ 19:06) (130/89 - 130/89)  BP(mean): --  RR: --  SpO2: --    Orthostatic VS  07-05-22 @ 08:24  Lying BP: --/-- HR: --  Sitting BP: 99/65 HR: 84  Standing BP: --/-- HR: --  Site: --  Mode: --   Vital Signs Last 24 Hrs  T(C): 37.1 (07-05-22 @ 19:10), Max: 37.1 (07-05-22 @ 19:10)  T(F): 98.7 (07-05-22 @ 19:10), Max: 98.7 (07-05-22 @ 19:10)  HR: --  BP: --  BP(mean): --  RR: --  SpO2: --    Orthostatic VS  07-05-22 @ 19:10  Lying BP: --/-- HR: --  Sitting BP: 118/74 HR: 81  Standing BP: --/-- HR: --  Site: --  Mode: --  Orthostatic VS  07-05-22 @ 08:24  Lying BP: --/-- HR: --  Sitting BP: 99/65 HR: 84  Standing BP: --/-- HR: --  Site: --  Mode: --

## 2022-07-05 NOTE — BH INPATIENT PSYCHIATRY PROGRESS NOTE - NSBHFUPINTERVALHXFT_PSY_A_CORE
No acute events overnight. Pt compliant with meds. Slept poorly.  No acute events overnight. Pt reports stable mood. Denies SI/SA and HI/HP. Compliant with meds. Slept poorly. Reports adequate PO intake and appetite. Pt has been repeatedly intrusive with staff requiring PO PRNs, repeatedly asking about discharge.  No acute events overnight. Pt reports stable mood. Denies SI/SA and HI/HP. Compliant with meds. Slept poorly. Reports adequate PO intake and appetite. Pt has been repeatedly intrusive with staff requiring PO PRNs, repeatedly asking about discharge. Pt very difficulty to follow during interview, changing between English and Chadian, refused . States he came into the hospital due to rib pain after a fall, now feels better, and desires discharge. 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 Says he only needs to sleep one or two hours in between jobs.

## 2022-07-05 NOTE — BH INPATIENT PSYCHIATRY PROGRESS NOTE - NSBHASSESSSUMMFT_PSY_ALL_CORE
On service for this 51 year old man, domiciled with mother, unemployed. Patient has PPHx of depression and no hx of prior hospitalizations.  Patient is brought in by his brother.  Patient is hospitalized with a primary problem of worsening mood, paranoia and insomnia. Patient admitted to [facility] on a 9.39 legal status.     Pt remains manic, with poor sleep, intrusive behavior, flight of ideas, pressured speech.     Plan: Titrate Risperdal to 2mg qhs.    Pt is a 52yo man who lives with his mother and brother w/ hx of depression, previously prescribed Zoloft by PMD, though per family with likely long hx of untreated sherrie and psychosis, admitted at request of family due to concern that pt not sleeping, illogical, paranoid (about [place] mafia and being monitored), spending excessively/gambling, and becoming increasingly agitated and threatening.     Pt presents with symptoms of acute sherrie, including elevated and irritable mood, increased energy, decreased need for sleep, pressured speech, and flight of ideas. Pt also very intrusive and repeatedly asking to speak with treatment team with urgent wish for discharge. Also with likely psychotic component to illness (as per collateral obtained from family), though specific content difficult to assess at this time. Most likely ddx Bipolar I Disorder.     Plan: Titrate Risperdal to 2mg qhs. Will consider starting mood stabilizer.

## 2022-07-06 PROCEDURE — 99232 SBSQ HOSP IP/OBS MODERATE 35: CPT

## 2022-07-06 RX ORDER — CLONAZEPAM 1 MG
0.5 TABLET ORAL
Refills: 0 | Status: DISCONTINUED | OUTPATIENT
Start: 2022-07-06 | End: 2022-07-11

## 2022-07-06 RX ORDER — DIVALPROEX SODIUM 500 MG/1
500 TABLET, DELAYED RELEASE ORAL
Refills: 0 | Status: DISCONTINUED | OUTPATIENT
Start: 2022-07-06 | End: 2022-07-12

## 2022-07-06 RX ADMIN — HALOPERIDOL DECANOATE 2 MILLIGRAM(S): 100 INJECTION INTRAMUSCULAR at 12:31

## 2022-07-06 RX ADMIN — Medication 2 MILLIGRAM(S): at 21:55

## 2022-07-06 RX ADMIN — Medication 0.5 MILLIGRAM(S): at 20:45

## 2022-07-06 RX ADMIN — DIVALPROEX SODIUM 500 MILLIGRAM(S): 500 TABLET, DELAYED RELEASE ORAL at 20:45

## 2022-07-06 RX ADMIN — RISPERIDONE 2 MILLIGRAM(S): 4 TABLET ORAL at 20:46

## 2022-07-06 RX ADMIN — PANTOPRAZOLE SODIUM 40 MILLIGRAM(S): 20 TABLET, DELAYED RELEASE ORAL at 08:06

## 2022-07-06 RX ADMIN — Medication 2 MILLIGRAM(S): at 12:31

## 2022-07-06 NOTE — BH INPATIENT PSYCHIATRY PROGRESS NOTE - NSBHASSESSSUMMFT_PSY_ALL_CORE
Pt is a 52yo man who lives with his mother and brother w/ hx of depression, previously prescribed Zoloft by PMD, though per family with likely long hx of untreated sherrie and psychosis, admitted at request of family due to concern that pt not sleeping, illogical, paranoid (about [place] mafia and being monitored), spending excessively/gambling, and becoming increasingly agitated and threatening. Pt presents with symptoms of acute sherrie, including elevated and irritable mood, increased energy, decreased need for sleep, pressured speech, and flight of ideas. Most likely ddx Bipolar I Disorder.     Pt remains manic with intrusive behavior.     Plan:   - C/w Risperdal 2mg qhs.   - Start Depakote DR 500mg BID  - Start Klonopin 0.5mg BID for manic agitation (given pt requiring PO prns daily)  - Will get AM labs TSH, T4, liver function panel, A1C, and lipid panel

## 2022-07-06 NOTE — BH INPATIENT PSYCHIATRY PROGRESS NOTE - NSBHMSESPABN_PSY_A_CORE
Loud volume/Increased productivity

## 2022-07-06 NOTE — BH CHART NOTE - NSEVENTNOTEFT_PSY_ALL_CORE
Spoke with staff at Dr. [facility], the patient's PCP (phone: [phone]). Staff confirmed that pt was prescribed sertraline 50mg on _____date___ after he presented with symptoms of _____. Pt's medical issues include ______. Will be in touch with Dr. Meraz about pt's care.  Spoke with staff at Dr. [facility] Mer[facility], the patient's PCP (phone: [phone]). Staff confirmed that pt was prescribed sertraline 50mg starting in 2020. Pt's medical issues include prediabetes managed with diet and acid reflux. Will be in touch with Dr. Meraz about pt's care.

## 2022-07-06 NOTE — BH CHART NOTE - NSEVENTNOTEFT_PSY_ALL_CORE
Spoke with pt's brother Eric (phone [phone]). Brother endorsed that three to four months ago he started to notice his brother's behavior change and that he has had symptoms of depression for "quite some time". 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 Four months ago pt's brother stated that pt could no longer work, lost his job, and he noticed his behavior change. Brother describes patient's behavior as "hyper". Questions/concerns about the pt's care were addressed. Will continue to keep family informed about patient's care.

## 2022-07-06 NOTE — BH INPATIENT PSYCHIATRY PROGRESS NOTE - NSBHCHARTREVIEWVS_PSY_A_CORE FT
Vital Signs Last 24 Hrs  T(C): 37.1 (07-06-22 @ 06:53), Max: 37.1 (07-05-22 @ 19:10)  T(F): 98.7 (07-06-22 @ 06:53), Max: 98.7 (07-05-22 @ 19:10)  HR: --  BP: --  BP(mean): --  RR: --  SpO2: --    Orthostatic VS  07-06-22 @ 12:16  Lying BP: --/-- HR: --  Sitting BP: 106/84 HR: 90  Standing BP: --/-- HR: --  Site: --  Mode: --  Orthostatic VS  07-06-22 @ 06:53  Lying BP: --/-- HR: --  Sitting BP: 99/77 HR: 92  Standing BP: --/-- HR: --  Site: --  Mode: --  Orthostatic VS  07-05-22 @ 19:10  Lying BP: --/-- HR: --  Sitting BP: 118/74 HR: 81  Standing BP: --/-- HR: --  Site: --  Mode: --  Orthostatic VS  07-05-22 @ 08:24  Lying BP: --/-- HR: --  Sitting BP: 99/65 HR: 84  Standing BP: --/-- HR: --  Site: --  Mode: --

## 2022-07-06 NOTE — BH INPATIENT PSYCHIATRY PROGRESS NOTE - NSBHFUPINTERVALHXFT_PSY_A_CORE
#[id number]    No acute events overnight. Pt reports feeling "good". Denies SI/SA and HI/HP. Compliant with meds. Slept fairly well apart from roaming the hallways from 12pm-1am. Reports adequate PO intake and appetite. Pt has been repeatedly intrusive with staff requiring PO PRNs. Pt was anxious, irritable, pressured and elevated during interview. Despite using an , the pt continues to be very difficult to follow during interview, . States he came into the hospital due to rib pain after a fall, now feels better, and desires discharge. 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 Says he only needs to sleep one or two hours in between jobs.   #[id number]    No acute events overnight. Pt reports feeling "good". Denies SI/SA and HI/HP. Compliant with meds. Slept fairly well apart from roaming the hallways from 1245pm-1am. Reports adequate PO intake and appetite. Pt has been repeatedly intrusive with staff requiring PO PRNs. Pt was anxious, irritable, pressured and elevated during interview. Despite using an , the pt continues to be very difficult to follow during interview. States that medication he is getting in the hospital makes him feel better than the medication he took outside of the hospital and desires discharge. 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 Pt interviewed with  ID #[id number].    No acute events overnight. Pt reports feeling "good". Denies SIIP and HIIP. Compliant with meds. Slept fairly well apart from roaming the hallways from 1245pm-1am. Reports adequate PO intake and appetite. Pt has been repeatedly intrusive with staff requiring PO PRNs. Pt was anxious, irritable, pressured and elevated during interview. Despite using an , the pt continues to be very difficult to follow during interview. States that medication he is getting in the hospital makes him feel better than the medication he took outside of the hospital and desires discharge. 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 Starts talking about his girlfriend cheating on him with other men at random, unable to relate this back to current conversation or questions posed by treatment team.

## 2022-07-06 NOTE — BH INPATIENT PSYCHIATRY PROGRESS NOTE - MSE UNSTRUCTURED FT
Pt is a 52yo man with fair grooming and hygiene. Pt calm and cooperative, though often intrusive. Some psychomotor agitation with pacing around the unit. Speech is pressured and rapid in rate. Stated mood is "good." Affect is elevated, at times irritable. TP with flight of ideas, very difficult to follow. TC: +paranoia, very preoccupied with discharge, some Hindu preoccupation, denies SIIP and HIIP. No perceptual disturbances noted. Insight is limited, judgement is fair. Impulse control is fair.

## 2022-07-06 NOTE — BH INPATIENT PSYCHIATRY PROGRESS NOTE - CURRENT MEDICATION
MEDICATIONS  (STANDING):  pantoprazole    Tablet 40 milliGRAM(s) Oral before breakfast  risperiDONE   Tablet 2 milliGRAM(s) Oral at bedtime    MEDICATIONS  (PRN):  haloperidol     Tablet 2 milliGRAM(s) Oral every 6 hours PRN agitation  haloperidol    Injectable 2 milliGRAM(s) IntraMuscular once PRN Agitation  LORazepam     Tablet 2 milliGRAM(s) Oral every 6 hours PRN Agitation  LORazepam   Injectable 2 milliGRAM(s) IntraMuscular once PRN Agitation   07-Nov-2019 18:33

## 2022-07-07 LAB
A1C WITH ESTIMATED AVERAGE GLUCOSE RESULT: 5.7 % — HIGH (ref 4–5.6)
ALBUMIN SERPL ELPH-MCNC: 4 G/DL — SIGNIFICANT CHANGE UP (ref 3.3–5)
ALP SERPL-CCNC: 107 U/L — SIGNIFICANT CHANGE UP (ref 40–120)
ALT FLD-CCNC: 36 U/L — SIGNIFICANT CHANGE UP (ref 4–41)
AST SERPL-CCNC: 27 U/L — SIGNIFICANT CHANGE UP (ref 4–40)
BILIRUB DIRECT SERPL-MCNC: <0.2 MG/DL — SIGNIFICANT CHANGE UP (ref 0–0.3)
BILIRUB INDIRECT FLD-MCNC: >0.4 MG/DL — SIGNIFICANT CHANGE UP (ref 0–1)
BILIRUB SERPL-MCNC: 0.6 MG/DL — SIGNIFICANT CHANGE UP (ref 0.2–1.2)
CHOLEST SERPL-MCNC: 206 MG/DL — HIGH
ESTIMATED AVERAGE GLUCOSE: 117 — SIGNIFICANT CHANGE UP
HDLC SERPL-MCNC: 35 MG/DL — LOW
LIPID PNL WITH DIRECT LDL SERPL: 141 MG/DL — HIGH
NON HDL CHOLESTEROL: 171 MG/DL — HIGH
PROT SERPL-MCNC: 7.7 G/DL — SIGNIFICANT CHANGE UP (ref 6–8.3)
T4 AB SER-ACNC: 8.6 UG/DL — SIGNIFICANT CHANGE UP (ref 5.1–13)
TRIGL SERPL-MCNC: 149 MG/DL — SIGNIFICANT CHANGE UP
TSH SERPL-MCNC: 2.58 UIU/ML — SIGNIFICANT CHANGE UP (ref 0.27–4.2)

## 2022-07-07 PROCEDURE — 99232 SBSQ HOSP IP/OBS MODERATE 35: CPT

## 2022-07-07 RX ORDER — HALOPERIDOL DECANOATE 100 MG/ML
5 INJECTION INTRAMUSCULAR EVERY 6 HOURS
Refills: 0 | Status: DISCONTINUED | OUTPATIENT
Start: 2022-07-07 | End: 2022-07-12

## 2022-07-07 RX ORDER — HALOPERIDOL DECANOATE 100 MG/ML
5 INJECTION INTRAMUSCULAR ONCE
Refills: 0 | Status: DISCONTINUED | OUTPATIENT
Start: 2022-07-07 | End: 2022-07-12

## 2022-07-07 RX ADMIN — Medication 0.5 MILLIGRAM(S): at 20:56

## 2022-07-07 RX ADMIN — PANTOPRAZOLE SODIUM 40 MILLIGRAM(S): 20 TABLET, DELAYED RELEASE ORAL at 08:03

## 2022-07-07 RX ADMIN — Medication 0.5 MILLIGRAM(S): at 08:03

## 2022-07-07 RX ADMIN — DIVALPROEX SODIUM 500 MILLIGRAM(S): 500 TABLET, DELAYED RELEASE ORAL at 08:03

## 2022-07-07 RX ADMIN — DIVALPROEX SODIUM 500 MILLIGRAM(S): 500 TABLET, DELAYED RELEASE ORAL at 20:56

## 2022-07-07 RX ADMIN — RISPERIDONE 2 MILLIGRAM(S): 4 TABLET ORAL at 20:55

## 2022-07-07 NOTE — BH INPATIENT PSYCHIATRY PROGRESS NOTE - CURRENT MEDICATION
MEDICATIONS  (STANDING):  clonazePAM  Tablet 0.5 milliGRAM(s) Oral two times a day  diVALproex  milliGRAM(s) Oral two times a day  pantoprazole    Tablet 40 milliGRAM(s) Oral before breakfast  risperiDONE   Tablet 2 milliGRAM(s) Oral at bedtime    MEDICATIONS  (PRN):  haloperidol     Tablet 2 milliGRAM(s) Oral every 6 hours PRN agitation  haloperidol    Injectable 2 milliGRAM(s) IntraMuscular once PRN Agitation  LORazepam     Tablet 2 milliGRAM(s) Oral every 6 hours PRN Agitation  LORazepam   Injectable 2 milliGRAM(s) IntraMuscular once PRN Agitation

## 2022-07-07 NOTE — BH INPATIENT PSYCHIATRY PROGRESS NOTE - NSBHFUPINTERVALHXFT_PSY_A_CORE
No acute events overnight. Pt compliant with meds, did not require prns. Noted with much improved sleep. This morning pt appears calmer, less anxious. Speech is slowed and thought process more linear. Continues to believe he is currently admitted due to fall several weeks ago and says meds are "helping" with previous rib pain. 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 Worried about caring for his mother at home and still discharge focused though accepting of need to stay in the hospital for the time being. Later in the day pt noted to be extremely intrusive and discharge focused, states he will call the [employer] to . Says that mother's leg is hurt and his brothers are "liars."

## 2022-07-07 NOTE — BH INPATIENT PSYCHIATRY PROGRESS NOTE - MSE UNSTRUCTURED FT
Pt is a 50yo man with fair grooming and hygiene. Pt more calm and cooperative, though quite intrusive in the afternoon, requiring frequent redirection. Some psychomotor agitation with pacing around the unit. Speech is still pressured at times, though decreased in rate, normal volume. Stated mood is "good." Affect is elevated, at times irritable. TP more linear though at times with flight of ideas and loss of goal. TC: +paranoia, very preoccupied with discharge, some Episcopalian preoccupation, denies SIIP and HIIP. No perceptual disturbances noted. Insight is limited, judgement is fair. Impulse control is fair.

## 2022-07-07 NOTE — BH INPATIENT PSYCHIATRY PROGRESS NOTE - NSBHASSESSSUMMFT_PSY_ALL_CORE
Pt is a 52yo man who lives with his mother and brother w/ hx of depression, previously prescribed Zoloft by PMD, though per family with likely long hx of untreated maisha and psychosis, admitted at request of family due to concern that pt not sleeping, illogical, paranoid (about [place] mafia and being monitored), spending excessively/gambling, and becoming increasingly agitated and threatening. Pt presents with symptoms of acute maisha, including elevated and irritable mood, increased energy, decreased need for sleep, pressured speech, and flight of ideas. Most likely ddx Bipolar I Disorder.     Maisha improved somewhat (particularly this morning) though in the afternoon pt quite intrusive, very preoccupied with discharge, requiring frequent redirection from staff, and seeming somewhat paranoid.     Plan:   - C/w Risperdal 2mg qhs.   - C/w Depakote DR 500mg BID  - C/w Klonopin 0.5mg BID for manic agitation (given pt requiring PO prns daily, plan to taper prior to discharge)  - F/u labs TSH, T4, liver function panel, A1C, and lipid panel all within normal limits

## 2022-07-07 NOTE — BH INPATIENT PSYCHIATRY PROGRESS NOTE - NSBHCHARTREVIEWLAB_PSY_A_CORE FT
LIVER FUNCTIONS - ( 07 Jul 2022 08:13 )  Alb: 4.0 g/dL / Pro: 7.7 g/dL / ALK PHOS: 107 U/L / ALT: 36 U/L / AST: 27 U/L / GGT: x

## 2022-07-07 NOTE — BH INPATIENT PSYCHIATRY PROGRESS NOTE - NSBHCHARTREVIEWVS_PSY_A_CORE FT
Vital Signs Last 24 Hrs  T(C): 36.5 (07-07-22 @ 08:15), Max: 36.5 (07-07-22 @ 08:15)  T(F): 97.7 (07-07-22 @ 08:15), Max: 97.7 (07-07-22 @ 08:15)  HR: 85 (07-07-22 @ 08:15) (85 - 85)  BP: 106/72 (07-07-22 @ 08:15) (106/72 - 106/72)  BP(mean): --  RR: --  SpO2: --    Orthostatic VS  07-06-22 @ 12:16  Lying BP: --/-- HR: --  Sitting BP: 106/84 HR: 90  Standing BP: --/-- HR: --  Site: --  Mode: --  Orthostatic VS  07-06-22 @ 06:53  Lying BP: --/-- HR: --  Sitting BP: 99/77 HR: 92  Standing BP: --/-- HR: --  Site: --  Mode: --  Orthostatic VS  07-05-22 @ 19:10  Lying BP: --/-- HR: --  Sitting BP: 118/74 HR: 81  Standing BP: --/-- HR: --  Site: --  Mode: --

## 2022-07-08 LAB — SARS-COV-2 RNA SPEC QL NAA+PROBE: SIGNIFICANT CHANGE UP

## 2022-07-08 PROCEDURE — 99232 SBSQ HOSP IP/OBS MODERATE 35: CPT

## 2022-07-08 RX ORDER — RISPERIDONE 4 MG/1
3 TABLET ORAL AT BEDTIME
Refills: 0 | Status: DISCONTINUED | OUTPATIENT
Start: 2022-07-08 | End: 2022-07-12

## 2022-07-08 RX ADMIN — DIVALPROEX SODIUM 500 MILLIGRAM(S): 500 TABLET, DELAYED RELEASE ORAL at 08:20

## 2022-07-08 RX ADMIN — RISPERIDONE 3 MILLIGRAM(S): 4 TABLET ORAL at 20:11

## 2022-07-08 RX ADMIN — Medication 0.5 MILLIGRAM(S): at 20:11

## 2022-07-08 RX ADMIN — Medication 0.5 MILLIGRAM(S): at 08:20

## 2022-07-08 RX ADMIN — DIVALPROEX SODIUM 500 MILLIGRAM(S): 500 TABLET, DELAYED RELEASE ORAL at 20:10

## 2022-07-08 RX ADMIN — Medication 2 MILLIGRAM(S): at 12:08

## 2022-07-08 RX ADMIN — PANTOPRAZOLE SODIUM 40 MILLIGRAM(S): 20 TABLET, DELAYED RELEASE ORAL at 08:21

## 2022-07-08 NOTE — BH INPATIENT PSYCHIATRY PROGRESS NOTE - CURRENT MEDICATION
MEDICATIONS  (STANDING):  clonazePAM  Tablet 0.5 milliGRAM(s) Oral two times a day  diVALproex  milliGRAM(s) Oral two times a day  pantoprazole    Tablet 40 milliGRAM(s) Oral before breakfast  risperiDONE   Tablet 2 milliGRAM(s) Oral at bedtime    MEDICATIONS  (PRN):  haloperidol     Tablet 5 milliGRAM(s) Oral every 6 hours PRN agitation  haloperidol    Injectable 5 milliGRAM(s) IntraMuscular once PRN Agitation  LORazepam     Tablet 2 milliGRAM(s) Oral every 6 hours PRN Agitation  LORazepam   Injectable 2 milliGRAM(s) IntraMuscular once PRN Agitation

## 2022-07-08 NOTE — BH INPATIENT PSYCHIATRY PROGRESS NOTE - MSE UNSTRUCTURED FT
Pt is a 52yo man with fair grooming and hygiene. Pt more calm and cooperative, though still intrusive and requiring redirection with regards to wish for discharge. No psychomotor abnormalities noted. Speech is decreased in rate (to more normal), no longer pressured, normal volume. Stated mood is "good, better." Affect is more stable and in the realm of euthymic, full. TP more linear with infrequent loss of goal. TC: very preoccupied with discharge, some Mandaeism preoccupation, no evidence of dunia/fully-formed delusions, denies SIIP and HIIP. No perceptual disturbances noted. Insight is limited, judgement is fair. Impulse control is fair.

## 2022-07-08 NOTE — BH INPATIENT PSYCHIATRY PROGRESS NOTE - NSBHFUPINTERVALHXFT_PSY_A_CORE
Pt interviewed with  ID #[id number].   No acute events overnight. Pt compliant with meds and did not require prns. Slept very well Pt remains in good behavioral control though still intrusive with staff (most with regards to wish for discharge), requiring redirection often (though less so compared to yesterday). Mood appearing more stable, pt reports feeling good. Says current medication is "better" but cannot explain what symptoms are better. 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 Denies SIIP and HIIP. Says that he has been praying, denies hearing voice of God, says he is spiritual.     Per pt's request attempted to contact brothconi Vasquez at [phone], left voicemail message with request for call back.

## 2022-07-08 NOTE — BH INPATIENT PSYCHIATRY PROGRESS NOTE - NSBHASSESSSUMMFT_PSY_ALL_CORE
Pt is a 50yo man who lives with his mother and brother w/ hx of depression, previously prescribed Zoloft by PMD, though per family with likely long hx of untreated maisha and psychosis, admitted at request of family due to concern that pt not sleeping, illogical, paranoid (about [place] mafia and being monitored), spending excessively/gambling, and becoming increasingly agitated and threatening. Pt presents with symptoms of acute maisha, including elevated and irritable mood, increased energy, decreased need for sleep, pressured speech, and flight of ideas. Most likely ddx Bipolar I Disorder.     Maisha improving; pt slept very well, mood more stable, thought process increasingly linear, no longer pressured. Pt still intrusive though mostly with regards to wish for discharge.      Plan:   - Titrate Risperdal to 3mg qhs.   - C/w Depakote DR 500mg BID, will get serum level next week  - C/w Klonopin 0.5mg BID for manic agitation (given pt requiring PO prns daily, plan to taper prior to discharge)

## 2022-07-08 NOTE — BH INPATIENT PSYCHIATRY PROGRESS NOTE - NSBHCHARTREVIEWVS_PSY_A_CORE FT
Vital Signs Last 24 Hrs  T(C): 37.2 (07-08-22 @ 06:52), Max: 37.2 (07-08-22 @ 06:52)  T(F): 98.9 (07-08-22 @ 06:52), Max: 98.9 (07-08-22 @ 06:52)  HR: --  BP: --  BP(mean): --  RR: --  SpO2: --    Orthostatic VS  07-08-22 @ 06:52  Lying BP: --/-- HR: --  Sitting BP: 101/75 HR: 88  Standing BP: --/-- HR: --  Site: --  Mode: --

## 2022-07-09 RX ADMIN — PANTOPRAZOLE SODIUM 40 MILLIGRAM(S): 20 TABLET, DELAYED RELEASE ORAL at 08:23

## 2022-07-09 RX ADMIN — RISPERIDONE 3 MILLIGRAM(S): 4 TABLET ORAL at 20:05

## 2022-07-09 RX ADMIN — DIVALPROEX SODIUM 500 MILLIGRAM(S): 500 TABLET, DELAYED RELEASE ORAL at 08:23

## 2022-07-09 RX ADMIN — DIVALPROEX SODIUM 500 MILLIGRAM(S): 500 TABLET, DELAYED RELEASE ORAL at 20:05

## 2022-07-09 RX ADMIN — Medication 0.5 MILLIGRAM(S): at 08:23

## 2022-07-09 RX ADMIN — Medication 0.5 MILLIGRAM(S): at 20:05

## 2022-07-10 RX ADMIN — Medication 0.5 MILLIGRAM(S): at 08:09

## 2022-07-10 RX ADMIN — Medication 0.5 MILLIGRAM(S): at 20:43

## 2022-07-10 RX ADMIN — RISPERIDONE 3 MILLIGRAM(S): 4 TABLET ORAL at 20:43

## 2022-07-10 RX ADMIN — DIVALPROEX SODIUM 500 MILLIGRAM(S): 500 TABLET, DELAYED RELEASE ORAL at 20:43

## 2022-07-10 RX ADMIN — PANTOPRAZOLE SODIUM 40 MILLIGRAM(S): 20 TABLET, DELAYED RELEASE ORAL at 08:09

## 2022-07-10 RX ADMIN — DIVALPROEX SODIUM 500 MILLIGRAM(S): 500 TABLET, DELAYED RELEASE ORAL at 08:09

## 2022-07-11 LAB — VALPROATE SERPL-MCNC: 45 UG/ML — LOW (ref 50–100)

## 2022-07-11 RX ORDER — DIVALPROEX SODIUM 500 MG/1
1 TABLET, DELAYED RELEASE ORAL
Qty: 28 | Refills: 0
Start: 2022-07-11 | End: 2022-07-24

## 2022-07-11 RX ORDER — PANTOPRAZOLE SODIUM 20 MG/1
1 TABLET, DELAYED RELEASE ORAL
Qty: 0 | Refills: 0 | DISCHARGE
Start: 2022-07-11

## 2022-07-11 RX ORDER — SERTRALINE 25 MG/1
1 TABLET, FILM COATED ORAL
Qty: 0 | Refills: 0 | DISCHARGE

## 2022-07-11 RX ORDER — CLONAZEPAM 1 MG
0.5 TABLET ORAL AT BEDTIME
Refills: 0 | Status: DISCONTINUED | OUTPATIENT
Start: 2022-07-11 | End: 2022-07-12

## 2022-07-11 RX ORDER — CLONAZEPAM 1 MG
1 TABLET ORAL
Qty: 14 | Refills: 0
Start: 2022-07-11 | End: 2022-07-24

## 2022-07-11 RX ORDER — RISPERIDONE 4 MG/1
1 TABLET ORAL
Qty: 14 | Refills: 0
Start: 2022-07-11 | End: 2022-07-24

## 2022-07-11 RX ADMIN — DIVALPROEX SODIUM 500 MILLIGRAM(S): 500 TABLET, DELAYED RELEASE ORAL at 20:04

## 2022-07-11 RX ADMIN — PANTOPRAZOLE SODIUM 40 MILLIGRAM(S): 20 TABLET, DELAYED RELEASE ORAL at 07:55

## 2022-07-11 RX ADMIN — DIVALPROEX SODIUM 500 MILLIGRAM(S): 500 TABLET, DELAYED RELEASE ORAL at 07:55

## 2022-07-11 RX ADMIN — Medication 0.5 MILLIGRAM(S): at 20:05

## 2022-07-11 RX ADMIN — Medication 0.5 MILLIGRAM(S): at 07:55

## 2022-07-11 RX ADMIN — RISPERIDONE 3 MILLIGRAM(S): 4 TABLET ORAL at 20:04

## 2022-07-11 NOTE — BH INPATIENT PSYCHIATRY DISCHARGE NOTE - ATTENDING DISCHARGE PHYSICAL EXAMINATION:
Discharge MSE: Pt is a 52yo man with fair grooming and hygiene. Pt is animated but cooperative and easily redirectable. No psychomotor abnormalities noted. Speech is normal in rate, no longer pressured, normal volume. Stated mood is "very very wonderful". Affect is animated at times, full, more stable. TP increasingly linear. TC: no evidence of delusions or hallucinations, denies SIIP and HIIP. No perceptual disturbances noted. Insight is limited though improved, judgement is fair. Impulse control is intact.

## 2022-07-11 NOTE — BH INPATIENT PSYCHIATRY PROGRESS NOTE - CURRENT MEDICATION
MEDICATIONS  (STANDING):  clonazePAM  Tablet 0.5 milliGRAM(s) Oral two times a day  diVALproex  milliGRAM(s) Oral two times a day  pantoprazole    Tablet 40 milliGRAM(s) Oral before breakfast  risperiDONE   Tablet 3 milliGRAM(s) Oral at bedtime    MEDICATIONS  (PRN):  haloperidol     Tablet 5 milliGRAM(s) Oral every 6 hours PRN agitation  haloperidol    Injectable 5 milliGRAM(s) IntraMuscular once PRN Agitation  LORazepam     Tablet 2 milliGRAM(s) Oral every 6 hours PRN Agitation  LORazepam   Injectable 2 milliGRAM(s) IntraMuscular once PRN Agitation   MEDICATIONS  (STANDING):  clonazePAM  Tablet 0.5 milliGRAM(s) Oral at bedtime  diVALproex  milliGRAM(s) Oral two times a day  pantoprazole    Tablet 40 milliGRAM(s) Oral before breakfast  risperiDONE   Tablet 3 milliGRAM(s) Oral at bedtime    MEDICATIONS  (PRN):  haloperidol     Tablet 5 milliGRAM(s) Oral every 6 hours PRN agitation  haloperidol    Injectable 5 milliGRAM(s) IntraMuscular once PRN Agitation  LORazepam     Tablet 2 milliGRAM(s) Oral every 6 hours PRN Agitation  LORazepam   Injectable 2 milliGRAM(s) IntraMuscular once PRN Agitation

## 2022-07-11 NOTE — BH INPATIENT PSYCHIATRY DISCHARGE NOTE - NSDCMRMEDTOKEN_GEN_ALL_CORE_FT
Depakote 500 mg oral delayed release tablet: 1 tab(s) orally 2 times a day  KlonoPIN 0.5 mg oral tablet: 1 tab(s) orally once a day (at bedtime) MDD:0.5mg  pantoprazole 40 mg oral delayed release tablet: 1 tab(s) orally once a day (before a meal)  RisperDAL 3 mg oral tablet: 1 tab(s) orally once a day (at bedtime)

## 2022-07-11 NOTE — BH INPATIENT PSYCHIATRY PROGRESS NOTE - MSE UNSTRUCTURED FT
Pt is a 52yo man with fair grooming and hygiene. Pt is animated though without irritability and continues to be intrusive but cooperative and easily redirectable. No psychomotor abnormalities noted. Speech is normal in rate, no longer pressured, normal volume. Stated mood is "very very good". Affect is animated and slight constricted, but more stable. TP more linear with infrequent loss of goal. TC: no evidence of dunia/fully-formed delusions, denies AH/VH, SIIP and HIIP. No perceptual disturbances noted. Insight is limited, judgement is fair. Impulse control is intact.  Pt is a 50yo man with fair grooming and hygiene. Pt is animated though without irritability and continues to be intrusive but cooperative and easily redirectable. No psychomotor abnormalities noted. Speech is normal in rate, no longer pressured, normal volume. Stated mood is "very very good". Affect is animated at times, full, more stable. TP increasingly linear. TC: no evidence of dunia/fully-formed delusions, denies SIIP and HIIP. No perceptual disturbances noted. Insight is limited though improved, judgement is fair. Impulse control is intact.

## 2022-07-11 NOTE — BH INPATIENT PSYCHIATRY PROGRESS NOTE - NSBHASSESSSUMMFT_PSY_ALL_CORE
Pt is a 50yo man who lives with his mother and brother w/ hx of depression, previously prescribed Zoloft by PMD, though per family with likely long hx of untreated maisha and psychosis, admitted at request of family due to concern that pt not sleeping, illogical, paranoid (about [place] mafia and being monitored), spending excessively/gambling, and becoming increasingly agitated and threatening. Pt presents with symptoms of acute maisha, including elevated and irritable mood, increased energy, decreased need for sleep, pressured speech, and flight of ideas. Most likely ddx Bipolar I Disorder.     Maisha has improved, thought process is linear, no longer pressured or easily distractible. Pt slept very well, mood more stable. Pt remains intrusive but is easily redirectable and cooperative. Pt remains animated but is not irritable, agitated or aggressive.    Plan:   - C/w Risperdal 3mg qhs.   - C/w Depakote DR 500mg BID (current serum level 45mg)  - C/w Klonopin 0.5mg once in the evening for manic agitation, d/c morning dose  -Plan for d/c on 7/12     Pt is a 52yo man who lives with his mother and brother w/ hx of depression, previously prescribed Zoloft by PMD, though per family with likely long hx of untreated maisha and psychosis, admitted at request of family due to concern that pt not sleeping, illogical, paranoid (about [place] mafia and being monitored), spending excessively/gambling, and becoming increasingly agitated and threatening. Pt presents with symptoms of acute maisha, including elevated and irritable mood, increased energy, decreased need for sleep, pressured speech, and flight of ideas. Most likely ddx Bipolar I Disorder.     Maisha has improved, thought process is linear, no longer pressured or easily distractible. Pt slept very well, mood more stable. Pt remains intrusive but is easily redirectable and cooperative. Pt remains animated but is not irritable, agitated or aggressive.    Plan:   - C/w Risperdal 3mg qhs.   - C/w Depakote DR 500mg BID (current serum level 45.0 ug/mL)  - C/w Klonopin 0.5mg once in the evening for manic agitation, d/c morning dose  -Plan for d/c on 7/12     Pt is a 52yo man who lives with his mother and brother w/ hx of depression, previously prescribed Zoloft by PMD, though per family with likely long hx of untreated maisha and psychosis, admitted at request of family due to concern that pt not sleeping, illogical, paranoid (about [place] mafia and being monitored), spending excessively/gambling, and becoming increasingly agitated and threatening. Pt presents with symptoms of acute maisha, including elevated and irritable mood, increased energy, decreased need for sleep, pressured speech, and flight of ideas. Most likely ddx Bipolar I Disorder.     Maisha has improved, thought process is linear, no longer pressured or easily distractible. Pt slept very well, mood more stable. Pt remains intrusive but is easily redirectable and cooperative. Pt remains animated but is not irritable, agitated or aggressive.    Plan:   - C/w Risperdal 3mg qhs.   - C/w Depakote DR 500mg BID (current serum level 45.0 ug/mL)  - Decrease Klonopin to 0.5mg qhs   - Dispo planning underway

## 2022-07-11 NOTE — BH INPATIENT PSYCHIATRY DISCHARGE NOTE - NSDCCPCAREPLAN_GEN_ALL_CORE_FT
PRINCIPAL DISCHARGE DIAGNOSIS  Diagnosis: Bipolar I disorder, mild, current or most recent episode manic, with psychotic features  Assessment and Plan of Treatment:

## 2022-07-11 NOTE — BH INPATIENT PSYCHIATRY DISCHARGE NOTE - HPI (INCLUDE ILLNESS QUALITY, SEVERITY, DURATION, TIMING, CONTEXT, MODIFYING FACTORS, ASSOCIATED SIGNS AND SYMPTOMS)
The patient is a 51 year old man, domiciled with mother, unemployed, PHx of depression (prescribed Sertraline 50mg and Hydroxyzine 10mg), no hx of hospitalizations, no substance use, no history of SA or NSSIB, PMHx of GERD, brought in by brother for worsening paranoia and insomnia.  On assessment the patient is difficult to understand and unable to give a full history. He asks odd questions about "wonder woman," and then jump up from his bed to imitate a soldier's march. He does not know why he is in the hospital. He says one night last week he only slept 1 hour. Denies SI/HI/AH/VH. When asked about hearing voices or hallucinations he response by explaining how loud his own voice is. He jumps from one topic to the next very quickly and is difficult to follow. He talks about his girlfriend who works in the hospital, which his brother confirms is not true.    Collateral information received from patient's brother, Tomasz ([phone]):  Per SW note:  Pt's brother reports that pt has been very paranoid in recent weeks.  States he has the belief that the [facility] is after his uncle in [place].  He also states that pt has the belief that he and his brothers killed their father, however this is not true.  He states that pt has also been demanding of his mother-states he demands her credit card, uses it to buy various items and has taken cash out on the card, and when pt's mother tells him no, pt becomes very demanding, agitated, and verbally aggressive.  He states that pt does not hit her, but berates her in an aggressive manner.  As per brother, today pt was found to be screaming in the street and not making much sense.  He reports no particular psychiatric history, but states that pt's PCP has told him he needs 'psychiatric' help.  Brother states that pt is prescribed sertraline 50 mg and hydroxyzine 10 mg.  Brother reports that pt is not sleeping, calls him all hours of the night, and cannot keep a job as he is not able to relate well with others and often thinks people are out to get him.  Pt's brother also states that pt does not drink or use drugs.  He states that pt lives at home with his mother as well as his brother and his family.  Brother reports that pt's mother is concerned about pt being in the home due to his unpredictable behaviors and escalating aggression towards her.  Brother contacted by MD writer for further information:  He reports the patient has a history of GERD and was taking omeprazole in the past. He also reports that the patient has exhibited behaviors like this before, when their father  in September of last year. He reports then the patient began acting weird and not making sense, which is when his doctor began treating him psychiatrically. He is worried for the safety of their mother as she is disabled.    On unit: information received from: Chart review and patient interview  Patient is followed up for psychotic decompensation, bizarre behaviors, and paranoia. Patient is observed in interview room.  He is notably bizarre, oddly related.   Patient paranoid and suspicious.  When questioned about his mood pt reports “fine”  He denies SI/SIB/HI, reports no plan or intent, and engages in safety planning.   Attempted to discuss precipitating events leading to current admission and why he is here, however patient unable to give details.  Patient reports he had multiple falls prior to coming into hospital, reports most recent fall “on Monday” a few days ago and reports having pain to lower back and flank pain. Reports pain when taking deep breaths. Discussed with nursing team to send pt for xray    During interview patient presents as psychotic, with disorganized speech and delusional thoughts of  history, paranoia. Unable to fully participate in meaningful interview due to severity of psychotic/disorganized and paranoid symptoms. Interview limited.  Patient denies AVH, paranoia, or delusions.  Appears with perceptual disturbances. Although patient denies symptoms suggestive of sherrie: (denies grandiosity, racing thoughts and increased goal directed activities or pressured speech, and elevated mood/ denied any increased in energy level causing sleep disruption), there is concern for possible sherrie. No signs of catatonia.  He denies history of violence, denies access to firearm. Denies legal issues, and denies any substance abuse, No past trauma.     PMH: GERD (not on medications)    Risks and benefits of medications reviewed with patient and patient consented to current medication regimen: Risperdal 0.5mg qhs  The following Diagnoses are based on currently available information and may change as additional information. Patient presents with five (or more) diagnostic criteria to support the following diagnosis.    Psychiatric Diagnosis: Primary Dx Psychosis, unspecified psychosis type F29.   Differential:  Bipolar disorder (longstanding, undiagnosed) vs MDD w/ psychotic features

## 2022-07-11 NOTE — BH INPATIENT PSYCHIATRY DISCHARGE NOTE - OTHER PAST PSYCHIATRIC HISTORY (INCLUDE DETAILS REGARDING ONSET, COURSE OF ILLNESS, INPATIENT/OUTPATIENT TREATMENT)
EMR reports "The patient is a 51 year old man, domiciled with mother, unemployed, PHx of depression (prescribed Sertraline 50mg and Hydroxyzine 10mg), no hx of hospitalizations, no substance use, no history of SA or NSSIB, PMHx of GERD, brought in by brother for worsening paranoia and insomnia."

## 2022-07-11 NOTE — BH INPATIENT PSYCHIATRY DISCHARGE NOTE - HOSPITAL COURSE
Hospital Course:    Pt presented with symptoms of acute maisha, including elevated and irritable mood, increased energy, decreased need for sleep, pressured speech, and flight of ideas. Pt was also very intrusive and repeatedly asking to speak with treatment team with urgent wish for discharge. Also with likely psychotic component to illness (as per collateral obtained from family). Most likely ddx Bipolar I Disorder.     7/5   Plan:   -Titrated Risperdal to 2mg qhs.   -Considered starting mood stabilizer.      7/6  Pt remains manic with intrusive behavior.   Plan:   - C/w Risperdal 2mg qhs.   - Start Depakote DR 500mg BID  - Start Klonopin 0.5mg BID for manic agitation (given pt requiring PO prns daily)  - Will get AM labs TSH, T4, liver function panel, A1C, and lipid panel     7/7  Maisha improved somewhat (particularly this morning) though in the afternoon pt quite intrusive, very preoccupied with discharge, requiring frequent redirection from staff, and seeming somewhat paranoid.   Plan:   - C/w Risperdal 2mg qhs.   - C/w Depakote DR 500mg BID  - C/w Klonopin 0.5mg BID for manic agitation (given pt requiring PO prns daily, plan to taper prior to discharge)  - F/u labs TSH, T4, liver function panel, A1C, and lipid panel all within normal limits .      7/8  Maisha improving; pt slept very well, mood more stable, thought process increasingly linear, no longer pressured. Pt still intrusive though mostly with regards to wish for discharge.   Plan:   - Titrate Risperdal to 3mg qhs.   - C/w Depakote DR 500mg BID, will get serum level next week  - C/w Klonopin 0.5mg BID for manic agitation (given pt requiring PO prns daily, plan to taper prior to discharge)    7/11  Maisha has improved, thought process is linear, no longer pressured or easily distractible. Pt slept very well, mood more stable. Pt remains intrusive but is easily redirectable and cooperative. Pt remains animated but is not irritable, agitated or aggressive.  Plan:   - C/w Risperdal 3mg qhs.   - C/w Depakote DR 500mg BID (current serum level 45.0 ug/mL)  - C/w Klonopin 0.5mg once in the evening for manic agitation, d/c morning dose       Hospital Course:    Pt is a 50yo man who lives with his mother and brother w/ hx of depression, previously prescribed Zoloft by PMD, though per family with likely long hx of untreated maisha and psychosis, admitted at request of family due to concern that pt not sleeping, illogical, paranoid (about [place] mafia and being monitored), spending excessively/gambling, and becoming increasingly agitated and threatening.    On day of admission, the pt presented with symptoms of acute maisha, including elevated and irritable mood, increased energy, decreased need for sleep, pressured speech, and flight of ideas. Pt was also very intrusive and repeatedly asking to speak with treatment team with urgent wish for discharge. After pt was admitted to the unit, he remained manic with intrusive behavior requiring PRN medication management. History pointed to months to years of chronic maisha, most recently with psychotic features.    The treatment team started the pt on Risperdal, titrated to 3mg qhs. Team also started pt on Depakote 500mg BID and Klonopin 0.5mg BID for manic agitation (given pt's need for PO prns daily). The team also evaluated pt's TSH, T4, liver function panel, A1C, and lipid panel, which were all within normal limits.     Pt responded well to medications and reported no side effects. Maisha improved, thought process became more linear, and he was no longer pressured or easily distractible. Pt's sleep improved and mood was more stable. Pt remained intrusive but was easily redirectable and cooperative. Pt also remained animated and slightly constricted, but was not irritable, agitated or aggressive. Depakote level upon discharge was 45.0 ug/mL and despite subtherapeutic level no further titration was indicated given symptomatic improvement. Klonopin was also reduced to 0.5mg once in the evening for manic agitation.      7/5   Plan:   -Titrated Risperdal to 2mg qhs.   -Considered starting mood stabilizer.      7/6  Pt remains manic with intrusive behavior.   Plan:   - C/w Risperdal 2mg qhs.   - Start Depakote DR 500mg BID  - Start Klonopin 0.5mg BID for manic agitation (given pt requiring PO prns daily)  - Will get AM labs TSH, T4, liver function panel, A1C, and lipid panel     7/7  Maisha improved somewhat (particularly this morning) though in the afternoon pt quite intrusive, very preoccupied with discharge, requiring frequent redirection from staff, and seeming somewhat paranoid.   Plan:   - C/w Risperdal 2mg qhs.   - C/w Depakote DR 500mg BID  - C/w Klonopin 0.5mg BID for manic agitation (given pt requiring PO prns daily, plan to taper prior to discharge)  - F/u labs TSH, T4, liver function panel, A1C, and lipid panel all within normal limits .      7/8  Maisha improving; pt slept very well, mood more stable, thought process increasingly linear, no longer pressured. Pt still intrusive though mostly with regards to wish for discharge.   Plan:   - Titrate Risperdal to 3mg qhs.   - C/w Depakote DR 500mg BID, will get serum level next week  - C/w Klonopin 0.5mg BID for manic agitation (given pt requiring PO prns daily, plan to taper prior to discharge)    7/11  Maisha has improved, thought process is linear, no longer pressured or easily distractible. Pt slept very well, mood more stable. Pt remains intrusive but is easily redirectable and cooperative. Pt remains animated but is not irritable, agitated or aggressive.  Plan:   - C/w Risperdal 3mg qhs.   - C/w Depakote DR 500mg BID (current serum level 45.0 ug/mL)  - C/w Klonopin 0.5mg once in the evening for manic agitation, d/c morning dose       Hospital Course:    Pt is a 50yo man who lives with his mother and brother w/ hx of depression, previously prescribed Zoloft by PMD, though per family with likely long hx of untreated maisha and psychosis, admitted at request of family due to concern that pt not sleeping, illogical, paranoid (about [place] mafia and being monitored), spending excessively/gambling, and becoming increasingly agitated and threatening.    On day of admission, the pt presented with symptoms of acute maisha, including elevated and irritable mood, increased energy, decreased need for sleep, pressured speech, and flight of ideas. Pt was also very intrusive and repeatedly asking to speak with treatment team with urgent wish for discharge. After pt was admitted to the unit, he remained manic with intrusive behavior requiring PRN medication management. History pointed to a ddx of bipolar I disorder given months to years of chronic maisha, most recently with psychotic features.    The treatment team started the pt on Risperdal, titrated to 3mg qhs. Team also started pt on Depakote 500mg BID and Klonopin 0.5mg BID for manic agitation (given pt's need for PO prns daily). The team also evaluated pt's TSH, T4, liver function panel, A1C, and lipid panel, which were all within normal limits.     Pt responded well to medications and reported no side effects. Maisha improved, thought process became increasingly linear, and he was no longer pressured or easily distractible. Pt's sleep improved and mood was more stable. Pt remained intrusive but was easily redirectable and cooperative. Pt also remained animated and slightly constricted, but was not irritable, agitated or aggressive. Depakote level upon discharge was 45.0 ug/mL and despite subtherapeutic level no further titration was indicated given symptomatic improvement. Klonopin was also reduced to 0.5mg once in the evening for manic agitation. Pt was discharged into the care of his brother Pedro (phone [phone]) and will be domiciled at home with his mother Evelina. The pt is looking forward to taking care of his mother, learning to cook, and applying to jobs at restaurants in the future. Pt is a 52yo man who lives with his mother and brother w/ hx of depression, previously prescribed Zoloft by PMD, though per family with likely long hx of untreated maisha and psychosis, admitted at request of family due to concern that pt not sleeping, illogical, paranoid (about [place] mafia and being monitored), spending excessively/gambling, and becoming increasingly agitated and threatening.    On day of admission, the pt presented with symptoms of acute maisha, including elevated and irritable mood, increased energy, decreased need for sleep, pressured speech, and flight of ideas. Pt was also very intrusive and repeatedly asking to speak with treatment team with urgent wish for discharge. After pt was admitted to the unit, he remained manic with intrusive behavior requiring PRN medication management. History pointed to most likely ddx of bipolar I disorder given months to years of chronic maisha, most recently with psychotic features.    The treatment team started the pt on Risperdal, titrated to 3mg qhs. Team also started pt on Depakote 500mg BID and Klonopin 0.5mg BID for manic agitation (given pt's need for PO prns daily).     Pt responded well to medications and reported no side effects. Maisha improved, thought process became increasingly linear, and he was no longer pressured or easily distractible. Pt's sleep improved and mood was more stable. Pt remained intrusive but was easily redirectable and cooperative. Pt also remained animated though mood was more stable.  Depakote level upon discharge was 45.0 ug/mL and despite subtherapeutic level no further titration was indicated given symptomatic improvement. Klonopin was also reduced to 0.5mg qhs given improved agitation/anxiety (with recommendation to discontinue outpatient).     During hospitalization pt consistently denied SIIP and HIIP. He was interactive and engaged in the unit milieu and tended to ADLs appropriately. His family (mother and three brothers) were very involved in treatment and quite supportive. Pt was in good behavioral control and never became agitated or aggressive (never required IM prns or restraints).     Pt was discharged with a two-week supply of meds Risperdal 3mg qhs, Depakote DR 500mg BID, and Klonopin 0.5mg qhs. Recommend discontinuing Klonopin outpatient if pt remains clinically stable.

## 2022-07-11 NOTE — BH INPATIENT PSYCHIATRY PROGRESS NOTE - NSBHFUPINTERVALHXFT_PSY_A_CORE
Pt interviewed with  ID #[id number].   No acute events overnight. Pt continues to be compliant with meds and slept well overnight. Mood appears stable, pt reports feeling "very very good". Says current medication is "better" because he is now able to sleep through the night. Pt is more linear and less pressured during interview. Pt remains animated an intrusive but without being irritable and was easily redirectable. There was no mention of Rastafarian content during interview and pt denies paranoia. Pt denies AH/VH and states that he feels safe at home. Pt denies the feeling that anyone is out to get him or messing with him. Denies SIIP and HIIP. 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  Pt interviewed with  ID #[id number].   No acute events overnight. Pt continues to be compliant with meds and slept well overnight. Mood appears stable, pt reports feeling "very very good". Says current medication is "better" because he is now able to sleep through the night. Pt is more linear and less pressured during interview. Pt remains animated and intrusive but without being irritable and was easily redirectable. There was no mention of [place]ian content during interview and pt denies paranoia. Pt denies AH/VH and states that he feels safe at home. Pt denies the feeling that anyone is out to get him or messing with him. Denies SIIP and HIIP. 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

## 2022-07-11 NOTE — BH CHART NOTE - NSEVENTNOTEFT_PSY_ALL_CORE
Used  Ava (#[id number])    Attempted to call pt's mother Evelina (phone [phone]). Evelina answered but stated that she had a headache and could not speak. Also called pt's brother Pedro (phone [phone]) and there was no answer. Will attempt to contact family again to update them on patient's care.

## 2022-07-11 NOTE — BH INPATIENT PSYCHIATRY DISCHARGE NOTE - NSBHDCRISKMITIGATEFT_PSY_ALL_CORE
Risk Assessment: Acute risk factors include recent sherrie with psychosis, now treated with inpatient hospitalization. Other protective factors that mitigate acute risks include pt has consistently denied SIIP and HIIP, mood now stable, he has been in good behavioral control, pt without hx of SA or prior violence, he is compliant with meds, he has very strong social supports of family, has stable domicile, is hopeful and future-oriented, and he is able to engage in safety planning. Pt agreeable to contact outpt providers, call 911, or go to the nearest ED with any imminent safety concerns for self or others.

## 2022-07-11 NOTE — BH INPATIENT PSYCHIATRY PROGRESS NOTE - NSBHCHARTREVIEWVS_PSY_A_CORE FT
Vital Signs Last 24 Hrs  T(C): 36.8 (07-11-22 @ 08:02), Max: 36.8 (07-11-22 @ 08:02)  T(F): 98.2 (07-11-22 @ 08:02), Max: 98.2 (07-11-22 @ 08:02)  HR: --  BP: --  BP(mean): --  RR: --  SpO2: --    Orthostatic VS  07-11-22 @ 08:02  Lying BP: --/-- HR: --  Sitting BP: 106/70 HR: 76  Standing BP: --/-- HR: --  Site: --  Mode: --  Orthostatic VS  07-10-22 @ 07:51  Lying BP: --/-- HR: --  Sitting BP: 99/71 HR: 101  Standing BP: --/-- HR: --  Site: --  Mode: --

## 2022-07-12 VITALS — TEMPERATURE: 98 F

## 2022-07-12 LAB — SARS-COV-2 RNA SPEC QL NAA+PROBE: SIGNIFICANT CHANGE UP

## 2022-07-12 RX ADMIN — PANTOPRAZOLE SODIUM 40 MILLIGRAM(S): 20 TABLET, DELAYED RELEASE ORAL at 08:09

## 2022-07-12 RX ADMIN — DIVALPROEX SODIUM 500 MILLIGRAM(S): 500 TABLET, DELAYED RELEASE ORAL at 08:09

## 2022-07-12 NOTE — BH DISCHARGE NOTE NURSING/SOCIAL WORK/PSYCH REHAB - DISCHARGE INSTRUCTIONS AFTERCARE APPOINTMENTS
In order to check the location, date, or time of your aftercare appointment, please refer to your Discharge Instructions Document given to you upon leaving the hospital.  If you have lost the instructions please call [phone]

## 2022-07-12 NOTE — BH INPATIENT PSYCHIATRY PROGRESS NOTE - NSBHATTESTTYPEVISIT_PSY_A_CORE
Attending Only
Attending Only
PALOMA without on-site Attending supervision
Attending with Resident/Fellow/Student

## 2022-07-12 NOTE — BH INPATIENT PSYCHIATRY PROGRESS NOTE - NSICDXBHPRIMARYDX_PSY_ALL_CORE
Bipolar I disorder, current or most recent episode manic, with psychotic features   F31.2  
Psychosis   F29  
Bipolar I disorder, current or most recent episode manic, with psychotic features   F31.2  
Psychosis   F29  
Bipolar I disorder, current or most recent episode manic, with psychotic features   F31.2

## 2022-07-12 NOTE — BH DISCHARGE NOTE NURSING/SOCIAL WORK/PSYCH REHAB - NSDCPRRECOMMEND_PSY_ALL_CORE
Psychiatric Rehabilitation staff recommends that the patient engage in outpatient services for continued medication and symptom management. Upon discharge, patient has an appointment scheduled. See Discharge paperwork for more information.

## 2022-07-12 NOTE — BH INPATIENT PSYCHIATRY PROGRESS NOTE - PRN MEDS
MEDICATIONS  (PRN):  haloperidol     Tablet 5 milliGRAM(s) Oral every 6 hours PRN agitation  haloperidol    Injectable 5 milliGRAM(s) IntraMuscular once PRN Agitation  LORazepam     Tablet 2 milliGRAM(s) Oral every 6 hours PRN Agitation  LORazepam   Injectable 2 milliGRAM(s) IntraMuscular once PRN Agitation  
MEDICATIONS  (PRN):  haloperidol     Tablet 2 milliGRAM(s) Oral every 6 hours PRN agitation  haloperidol    Injectable 2 milliGRAM(s) IntraMuscular once PRN Agitation  LORazepam     Tablet 2 milliGRAM(s) Oral every 6 hours PRN Agitation  LORazepam   Injectable 2 milliGRAM(s) IntraMuscular once PRN Agitation  
MEDICATIONS  (PRN):  haloperidol     Tablet 2 milliGRAM(s) Oral every 6 hours PRN agitation  haloperidol    Injectable 2 milliGRAM(s) IntraMuscular once PRN Agitation  LORazepam     Tablet 2 milliGRAM(s) Oral every 6 hours PRN Agitation  LORazepam   Injectable 2 milliGRAM(s) IntraMuscular once PRN Agitation  
MEDICATIONS  (PRN):  haloperidol     Tablet 5 milliGRAM(s) Oral every 6 hours PRN agitation  haloperidol    Injectable 5 milliGRAM(s) IntraMuscular once PRN Agitation  LORazepam     Tablet 2 milliGRAM(s) Oral every 6 hours PRN Agitation  LORazepam   Injectable 2 milliGRAM(s) IntraMuscular once PRN Agitation  
MEDICATIONS  (PRN):  haloperidol     Tablet 5 milliGRAM(s) Oral every 6 hours PRN agitation  haloperidol    Injectable 5 milliGRAM(s) IntraMuscular once PRN Agitation  LORazepam     Tablet 2 milliGRAM(s) Oral every 6 hours PRN Agitation  LORazepam   Injectable 2 milliGRAM(s) IntraMuscular once PRN Agitation  
MEDICATIONS  (PRN):  haloperidol     Tablet 2 milliGRAM(s) Oral every 6 hours PRN agitation  haloperidol    Injectable 2 milliGRAM(s) IntraMuscular once PRN Agitation  LORazepam     Tablet 2 milliGRAM(s) Oral every 6 hours PRN Agitation  LORazepam   Injectable 2 milliGRAM(s) IntraMuscular once PRN Agitation  
MEDICATIONS  (PRN):  haloperidol     Tablet 2 milliGRAM(s) Oral every 6 hours PRN agitation  haloperidol    Injectable 2 milliGRAM(s) IntraMuscular once PRN Agitation  LORazepam     Tablet 2 milliGRAM(s) Oral every 6 hours PRN Agitation  LORazepam   Injectable 2 milliGRAM(s) IntraMuscular once PRN Agitation

## 2022-07-12 NOTE — BH INPATIENT PSYCHIATRY PROGRESS NOTE - NSBHATTESTSTAFFAMEND_PSY_A_CORE
I have personally seen and examined this patient. I fully participated in the care of this patient. I have made amendments to the documentation where appropriate and otherwise agree with the history, physical exam, and plan as documented by the

## 2022-07-12 NOTE — BH INPATIENT PSYCHIATRY PROGRESS NOTE - NSDCCRITERIA_PSY_ALL_CORE
symptom stabilization  cgi<=3

## 2022-07-12 NOTE — BH INPATIENT PSYCHIATRY PROGRESS NOTE - MSE UNSTRUCTURED FT
Pt is a 50yo man with fair grooming and hygiene. Pt is animated but cooperative and easily redirectable. No psychomotor abnormalities noted. Speech is normal in rate, no longer pressured, normal volume. Stated mood is "very very wonderful". Affect is animated at times, full, more stable. TP increasingly linear. TC: no evidence of delusions or hallucinations, denies SIIP and HIIP. No perceptual disturbances noted. Insight is limited though improved, judgement is fair. Impulse control is intact.

## 2022-07-12 NOTE — BH DISCHARGE NOTE NURSING/SOCIAL WORK/PSYCH REHAB - PATIENT PORTAL LINK FT
You can access the FollowMyHealth Patient Portal offered by [facility] by registering at the following website: [email or web address]. By joining [facility]’s FollowMyHealth portal, you will also be able to view your health information using other applications (apps) compatible with our system.

## 2022-07-12 NOTE — BH INPATIENT PSYCHIATRY PROGRESS NOTE - NSBHCHARTREVIEWVS_PSY_A_CORE FT
Vital Signs Last 24 Hrs  T(C): 36.4 (07-12-22 @ 06:36), Max: 36.4 (07-11-22 @ 19:22)  T(F): 97.6 (07-12-22 @ 06:36), Max: 97.6 (07-11-22 @ 19:22)  HR: --  BP: --  BP(mean): --  RR: --  SpO2: --    Orthostatic VS  07-12-22 @ 06:36  Lying BP: --/-- HR: --  Sitting BP: 106/62 HR: 90  Standing BP: --/-- HR: --  Site: --  Mode: --  Orthostatic VS  07-11-22 @ 08:02  Lying BP: --/-- HR: --  Sitting BP: 106/70 HR: 76  Standing BP: --/-- HR: --  Site: --  Mode: --

## 2022-07-12 NOTE — BH DISCHARGE NOTE NURSING/SOCIAL WORK/PSYCH REHAB - NSBHDCADDR1FT_A_CORE
[address] [place] [address] [address] [facility], [address]

## 2022-07-12 NOTE — BH INPATIENT PSYCHIATRY PROGRESS NOTE - NSTXMANICGOAL_PSY_ALL_CORE
Demonstrate a substantial reduction in both hyperactive pacing on the unit and social intrusiveness

## 2022-07-12 NOTE — BH INPATIENT PSYCHIATRY PROGRESS NOTE - NSBHATTESTCOMMENTATTENDFT_PSY_A_CORE
Maisha remains much improved; mood more stable, thought process linear, no longer pressured, sleeping very well. Pt remains in good behavioral control. Has consistently denied SIIP and HIIP. No evidence of dunia delusions, paranoia, or other psychotic symptoms. Family remains involved and very supportive. Pt compliant with meds, tending to ADLs appropriately, insight remains limited though improved. Plan for discharge home today on Risperdal, Depakote, and Klonopin (to be d/c outpt).     Risk Assessment: Acute risk factors include recent maisha with psychosis, now treated with inpatient hospitalization. Other protective factors that mitigate acute risks include pt has consistently denied SIIP and HIIP, mood now stable, he has been in good behavioral control, pt without hx of SA or prior violence, he is compliant with meds, he has very strong social supports of family, has stable domicile, is hopeful and future-oriented, and he is able to engage in safety planning. Pt agreeable to contact outpt providers, call 911, or go to the nearest ED with any imminent safety concerns for self or others. 
Maisha much improved; mood more stable, thought process linear, no longer with pressured sleep, and sleeping well. Pt remains somewhat animated, though this is likely pt's baseline. Still intrusive at times, mostly with regards to discharge plans, though easily redirection. Pt has been in good behavioral control, tending to ADLs appropriately, and compliant with meds. C/w Depakote DR 500mg BID (serum level a bit low at 45, can consider titrating outpt; pt stable now without urgent need for titration) and Risperdal 3mg qhs. Taper Klonopin to 0.5mg qhs. Dispo planning underway. 
Pt remains manic and intrusive requiring PO prns and frequent redirection by staff. Will c/w Risperdal and start Depakote 500mg BID as well as Klonopin 0.5mg BID for manic agitation. Will get repeat labs in the AM. Continuing to collaborate and provide updates to family. 
Pt is a 52yo man who lives with his mother and brother w/ hx of depression, previously prescribed Zoloft by PMD, though per family with likely long hx of untreated sherrie and psychosis, admitted at request of family due to concern that pt not sleeping, illogical, paranoid (about [place] mafia and being monitored), spending excessively/gambling, and becoming increasingly agitated and threatening.     Pt presents with symptoms of acute sherrie, including elevated and irritable mood, increased energy, decreased need for sleep, pressured speech, and flight of ideas. Pt also very intrusive and repeatedly asking to speak with treatment team with urgent wish for discharge. Also with likely psychotic component to illness (as per collateral obtained from family), though specific content difficult to assess at this time. Most likely ddx Bipolar I Disorder. Zoloft has been discontinued. Will titrate Risperdal to 2mg qhs and consider starting mood stabilizer.

## 2022-07-12 NOTE — BH INPATIENT PSYCHIATRY PROGRESS NOTE - CURRENT MEDICATION
MEDICATIONS  (STANDING):  clonazePAM  Tablet 0.5 milliGRAM(s) Oral at bedtime  diVALproex  milliGRAM(s) Oral two times a day  pantoprazole    Tablet 40 milliGRAM(s) Oral before breakfast  risperiDONE   Tablet 3 milliGRAM(s) Oral at bedtime    MEDICATIONS  (PRN):  haloperidol     Tablet 5 milliGRAM(s) Oral every 6 hours PRN agitation  haloperidol    Injectable 5 milliGRAM(s) IntraMuscular once PRN Agitation  LORazepam     Tablet 2 milliGRAM(s) Oral every 6 hours PRN Agitation  LORazepam   Injectable 2 milliGRAM(s) IntraMuscular once PRN Agitation

## 2022-07-12 NOTE — BH INPATIENT PSYCHIATRY PROGRESS NOTE - NSBHFUPINTERVALHXFT_PSY_A_CORE
Pt interviewed with  ID #[id number].     No acute events overnight. Pt continues to be compliant with meds and slept well overnight. Mood appears stable, pt reports feeling "very very wonderful". Pt continues to be more linear and less pressured during interview. Pt remains animated and but is easily redirectable and very cooperative. States that he believes he has gotten "better" since he was first admitted because he now speaks "more slowly" and is sleeping better. Pt endorses understanding that the medications are helping and states he intends to continue taking them as prescribed. Pt denies AH/VH and states that he feels safe at home. Denies SIIP and HIIP. States plans to apply to work at [employer] and take care of his mother when he returns home.     Spoke with patient's brother Tomasz (phone ) who endorsed that mother Evelina would be home to greet the patient upon discharge. Attempted to contact pt's PCP Dr. Dar Meraz (phone [phone]) and left a message with his staff updating him on pt's status. Pt interviewed with  ID #[id number].     No acute events overnight. Pt continues to be compliant with meds and slept well overnight. Mood appears stable, pt reports feeling "very very wonderful". Pt continues to be more linear and less pressured during interview. Pt remains animated and but is easily redirectable and very cooperative. States that he believes he has gotten "better" since he was first admitted because he now speaks "more slowly" and is sleeping better. Pt endorses understanding that the medications are helping and states he intends to continue taking them as prescribed. Pt denies AH/VH and states that he feels safe at home. Denies SIIP and HIIP. States plans to apply to work at [employer] and take care of his mother when he returns home.     Spoke with patient's brother Tomasz (phone ) who endorsed that mother Evelina would be home to greet the patient upon discharge. Also provided updates to pt's other brother Eric with regards to pt's medications and discharge plan. Attempted to contact pt's PCP Dr. Dar Meraz (phone [phone]) and left a message with his staff updating him on pt's status.

## 2022-07-12 NOTE — BH INPATIENT PSYCHIATRY PROGRESS NOTE - NSBHFUPINTERVALCCFT_PSY_A_CORE
f/u sherrie
psychosis/disorganization
f/u sherrie
f/u sherrie and psychosis
f/u sherrie

## 2022-07-12 NOTE — BH INPATIENT PSYCHIATRY PROGRESS NOTE - NSBHMETABOLIC_PSY_ALL_CORE_FT
BMI: BMI (kg/m2): 10.9 (07-03-22 @ 04:32)  HbA1c: A1C with Estimated Average Glucose Result: 5.7 % (07-07-22 @ 08:13)    Glucose:   BP: 106/72 (07-07-22 @ 08:15) (106/72 - 106/72)  Lipid Panel: Date/Time: 07-07-22 @ 08:13  Cholesterol, Serum: 206  Direct LDL: --  HDL Cholesterol, Serum: 35  Total Cholesterol/HDL Ration Measurement: --  Triglycerides, Serum: 149  
BMI: BMI (kg/m2): 10.9 (07-03-22 @ 04:32)  HbA1c: A1C with Estimated Average Glucose Result: 5.7 % (07-07-22 @ 08:13)    Glucose:   BP: --  Lipid Panel: Date/Time: 07-07-22 @ 08:13  Cholesterol, Serum: 206  Direct LDL: --  HDL Cholesterol, Serum: 35  Total Cholesterol/HDL Ration Measurement: --  Triglycerides, Serum: 149  
BMI: BMI (kg/m2): 10.9 (07-03-22 @ 04:32)  HbA1c:   Glucose:   BP: 130/89 (07-04-22 @ 19:06) (104/74 - 130/89)  Lipid Panel: 
BMI: BMI (kg/m2): 10.9 (07-03-22 @ 04:32)  HbA1c: A1C with Estimated Average Glucose Result: 5.7 % (07-07-22 @ 08:13)    Glucose:   BP: 106/72 (07-07-22 @ 08:15) (106/72 - 130/89)  Lipid Panel: Date/Time: 07-07-22 @ 08:13  Cholesterol, Serum: 206  Direct LDL: --  HDL Cholesterol, Serum: 35  Total Cholesterol/HDL Ration Measurement: --  Triglycerides, Serum: 149  
BMI: BMI (kg/m2): 10.9 (07-03-22 @ 04:32)  HbA1c: A1C with Estimated Average Glucose Result: 5.7 % (07-07-22 @ 08:13)    Glucose:   BP: --  Lipid Panel: Date/Time: 07-07-22 @ 08:13  Cholesterol, Serum: 206  Direct LDL: --  HDL Cholesterol, Serum: 35  Total Cholesterol/HDL Ration Measurement: --  Triglycerides, Serum: 149  
BMI: BMI (kg/m2): 10.9 (07-03-22 @ 04:32)  HbA1c:   Glucose:   BP: 130/89 (07-04-22 @ 19:06) (115/66 - 130/89)  Lipid Panel: 
BMI: BMI (kg/m2): 10.9 (07-03-22 @ 04:32)  HbA1c:   Glucose:   BP: 115/66 (07-03-22 @ 19:18) (104/74 - 122/88)  Lipid Panel:

## 2022-07-12 NOTE — BH INPATIENT PSYCHIATRY PROGRESS NOTE - NSTXDISORGGOAL_PSY_ALL_CORE
Will demonstrate the ability to maintain reality orientation and communicate clearly with others during 2 conversations with staff daily

## 2022-07-12 NOTE — BH INPATIENT PSYCHIATRY PROGRESS NOTE - NSBHCONSBHPROVDETAILS_PSY_A_CORE  FT
Spoke with staff at [facility] Dr. Dar Meraz's family medicine practice (phone: [phone]), confirm recent prescription of Zoloft, report pt was prescribed Risperdal and Thorazine in the distant past. 
Spoke with staff at [facility] Dr. Dar Meraz's family medicine practice (phone: [phone]), confirm recent prescription of Zoloft, report pt was prescribed Risperdal and Thorazine in the distant past. 
Spoke with staff at [facility] Dr. Dar Meraz's family medicine practice (phone: [phone]), updated them on patient status. Confirmed they will schedule f/u appt with pt.
Spoke with staff at [facility] Dr. Dar Meraz's family medicine practice (phone: [phone]), confirm recent prescription of Zoloft, report pt was prescribed Risperdal and Thorazine in the distant past. 
Spoke with staff at [facility] Dr. Dar Meraz's family medicine practice (phone: [phone]), confirm recent prescription of Zoloft, report pt was prescribed Risperdal and Thorazine in the distant past.

## 2022-07-12 NOTE — BH DISCHARGE NOTE NURSING/SOCIAL WORK/PSYCH REHAB - NSDCPRGOAL_PSY_ALL_CORE
Writer net with pt. to safety plan for discharge and discuss the pt. progress throughout inpatient stay. Pt. was receptive and needed assistance to understand the purpose of the safety plan as he was excited and easily distracted. Pt. was able to identify protective factors, supportive friends and family and reason for living. Upon admission, pt. presented with high energy, was intrusive and was observed as manic. During inpatient stay pt. participated in individual rounding, due to covid, and was pleasant, well mannered, and friendly. Pt. engaged with peers and staff and was able to be redirected when intrusive. At discharge pt. reports that he is “very very very ready to go home”. Pt. states that he feels like he is doing much better and when he gets out of the hospital he wants to help people and go back to working. Pt. mad progress towards established psych rehab goal as evidenced by his reduction in presenting manic symptoms but requires support and redirection so psych rehab encourages pt. to continue working on these things. Pt. exhibits medication compliance, good ADL’s, behavioral control and denies SI/HI/AH/VH/TH.

## 2022-07-12 NOTE — BH INPATIENT PSYCHIATRY PROGRESS NOTE - NSBHATTESTBILLINGAW_PSY_A_CORE
[id number]-Ivnutxdkjg Inpatient care - moderate complexity - 25 minutes
[id number]-Yxszmqhhix Inpatient care - moderate complexity - 25 minutes
[id number]-Euddtvshxq Inpatient care - high complexity - 35 minutes
02619-Mekkmuwpet Inpatient care - moderate complexity - 25 minutes
[id number]-Uzcjniyutl Inpatient care - moderate complexity - 25 minutes
[id number]-Nyyecuczfi Inpatient care - moderate complexity - 25 minutes
90092-Tiftxdqvgm Inpatient care - high complexity - 35 minutes

## 2022-07-12 NOTE — BH INPATIENT PSYCHIATRY PROGRESS NOTE - NSBHASSESSSUMMFT_PSY_ALL_CORE
Pt is a 52yo man who lives with his mother and brother w/ hx of depression, previously prescribed Zoloft by PMD, though per family with likely long hx of untreated maisha and psychosis, admitted at request of family due to concern that pt not sleeping, illogical, paranoid (about [place] mafia and being monitored), spending excessively/gambling, and becoming increasingly agitated and threatening. Pt presents with symptoms of acute maisha, including elevated and irritable mood, increased energy, decreased need for sleep, pressured speech, and flight of ideas. Most likely ddx Bipolar I Disorder.     Maisha has improved, thought process is linear, no longer pressured or easily distractible. Pt slept well, mood more stable. Pt remains animated but is easily redirectable and cooperative. Patient education about medications provided and safety assessment performed. Pt deemed appropriate for discharge.    Plan:   - C/w Risperdal 3mg qhs.   - C/w Depakote DR 500mg BID (current serum level 45.0 ug/mL)  - C/w Klonopin to 0.5mg qhs   - Dispo planned for 3pm today     Pt is a 50yo man who lives with his mother and brother w/ hx of depression, previously prescribed Zoloft by PMD, though per family with likely long hx of untreated maisha and psychosis, admitted at request of family due to concern that pt not sleeping, illogical, paranoid (about [place] mafia and being monitored), spending excessively/gambling, and becoming increasingly agitated and threatening. Pt presents with symptoms of acute maisha, including elevated and irritable mood, increased energy, decreased need for sleep, pressured speech, and flight of ideas. Most likely ddx Bipolar I Disorder.     Maisha has improved, thought process is linear, no longer pressured or easily distractible. Pt slept well, mood more stable. Pt remains animated but is easily redirectable and cooperative. Patient education about medications provided and safety assessment performed. Pt deemed appropriate for discharge.    Plan:   - C/w Risperdal 3mg qhs.   - C/w Depakote DR 500mg BID (current serum level 45.0 ug/mL)  - C/w Klonopin to 0.5mg qhs   - Will discharge to home today

## 2022-07-12 NOTE — BH DISCHARGE NOTE NURSING/SOCIAL WORK/PSYCH REHAB - NSBHDCAGENCY1FT_PSY_A_CORE
counseling center  [facility]  Hebrew: El pt debe llegar a falk paloma a las 8:15 am. 8 am es la hora preferida para la llegada. Pt será visto por Chantell Rosen. El paciente debe traer comprobante de domicilio, identificación con foto, tarjeta de seguro.    inglés: Pt should arrive to his appt at 8:15 am.  8 am is the preferred time for arrival. Pt will be seen by Chantell Rosen. Pt should bring proof of address, photo ID, insurance  card.

## 2022-07-12 NOTE — BH DISCHARGE NOTE NURSING/SOCIAL WORK/PSYCH REHAB - NSCDUDCCRISIS_PSY_A_CORE
[facility]  1 (922) [facility] ([phone])  Text "WELL" to 56527  Website: www.Outline App/.[employer] 1 (829) 871-IJPI (9587) Website: www.safehorizon.org/.National Suicide Prevention Lifeline 3 (522) 920-1996/.  [employer]  1 (375) LIFENET (456-0643)/.  [facility]  [address] [place] [phone]   Hours:  Monday through Friday from 9 AM to 3 PM/.  [employer] - Veterans Crisis Line  [phone], Option 1

## 2022-07-13 NOTE — SOCIAL WORK POST DISCHARGE FOLLOW UP NOTE - NSBHSWFOLLOWUP_PSY_ALL_CORE_FT
SW forwarded discharge summary to Pt's outpt follow up.  SW contacted clinic to confirm receipt. Confirmation obtained.

## 2022-07-21 NOTE — SOCIAL WORK POST DISCHARGE FOLLOW UP NOTE - NSBHSWFOLLOWUP_PSY_ALL_CORE_FT
SW was informed that Pt missed his follow up appt.  SW attempted phone contact with Pt.  SW attempted contact with Pt's brother.  SW left msg for Pt's brother. Pt was encouraged to comply with continuity of care needs throughout hospital stay and at time of discharge. At time of Pt's discharge treatment team deemed Pt was not at risk to self nor others.  SW to follow up.

## 2022-07-26 NOTE — SOCIAL WORK POST DISCHARGE FOLLOW UP NOTE - NSBHSWFOLLOWUP_PSY_ALL_CORE_FT
SW attempted contact with Pt and/or Pt's brother.  SW left msgs for both. SW left contact info for SW.  Pt was encouraged to comply with continuity of care needs throughout hospital stay and at time of discharge. At time of Pt's discharge treatment team deemed Pt was not at risk to self nor others.  No further SW interventions needed at this time.

## 2022-08-04 NOTE — SOCIAL WORK POST DISCHARGE FOLLOW UP NOTE - NSBHSWFOLLOWUP_PSY_ALL_CORE_FT
SW made multiple attempts to contact Pt and Pt's brother.  There has been no response to outreach. SW was able to contact Pt's brother on 8/4/22.  Pt's brother reported Pt was well. Pt's brother reported that Pt needs medications soon and intends to bring Pt to obtain meds from [facility].  SW explained Pt's need to follow up with outpt services first as Pt is known to them.  SW explained that Pt would need to be seen prior to anyone providing the meds. Pt's brother reported somene had informed him that he can bring Pt to hospital for the purposes of obtaining meds.  SW encouraged Pt's brother to first communicate with the out Pt and provided outpt contact info.  SW explained that should they not be able to assist. Pt can be brought to the [facility].  Pt was encouraged to comply with continuity of care needs throughout hospital stay, at time of discharge, and post discharge. At time of Pt's discharge treatment team deemed Pt was not at risk to self nor others.  SW to follow up.

## 2022-08-05 ENCOUNTER — OUTPATIENT (OUTPATIENT)
Dept: OUTPATIENT SERVICES | Facility: HOSPITAL | Age: 52
LOS: 1 days | Discharge: HOME | End: 2022-08-05

## 2022-08-05 DIAGNOSIS — Z98.890 OTHER SPECIFIED POSTPROCEDURAL STATES: Chronic | ICD-10-CM

## 2022-08-05 PROCEDURE — 99214 OFFICE O/P EST MOD 30 MIN: CPT | Mod: 95

## 2022-08-05 PROCEDURE — 90839 PSYTX CRISIS INITIAL 60 MIN: CPT

## 2022-08-08 DIAGNOSIS — F30.2 MANIC EPISODE, SEVERE WITH PSYCHOTIC SYMPTOMS: ICD-10-CM

## 2022-09-12 ENCOUNTER — OUTPATIENT (OUTPATIENT)
Dept: OUTPATIENT SERVICES | Facility: HOSPITAL | Age: 52
LOS: 1 days | Discharge: STILL ADMITTED OR ADMINISTRATIVE | End: 2022-09-12

## 2022-09-12 DIAGNOSIS — Z98.890 OTHER SPECIFIED POSTPROCEDURAL STATES: Chronic | ICD-10-CM

## 2022-10-25 NOTE — PATIENT PROFILE ADULT. - TEACHING/LEARNING FACTORS IMPACT ABILITY TO LEARN OTHER
Mom is calling for daughter she's loosing her voice mom wants to know if something can be called in or how to proceed she using zyrtec and hot showers cognitive limitations

## 2022-10-31 NOTE — ED PROVIDER NOTE - QRS
normal Tetracycline Counseling: Patient counseled regarding possible photosensitivity and increased risk for sunburn.  Patient instructed to avoid sunlight, if possible.  When exposed to sunlight, patients should wear protective clothing, sunglasses, and sunscreen.  The patient was instructed to call the office immediately if the following severe adverse effects occur:  hearing changes, easy bruising/bleeding, severe headache, or vision changes.  The patient verbalized understanding of the proper use and possible adverse effects of tetracycline.  All of the patient's questions and concerns were addressed. Patient understands to avoid pregnancy while on therapy due to potential birth defects.

## 2022-11-14 NOTE — BH SOCIAL WORK INITIAL PSYCHOSOCIAL EVALUATION - OTHER PAST PSYCHIATRIC HISTORY (INCLUDE DETAILS REGARDING ONSET, COURSE OF ILLNESS, INPATIENT/OUTPATIENT TREATMENT)
-- DO NOT REPLY / DO NOT REPLY ALL --  -- Message is from Engagement Center Operations (ECO) --    Order Request  DME - Durable Medical Supply - need diapers    Message / reason: need diapers.  Wife is requesting diapers for patients    Insurance type:   Payor: [facility] MEDICARE ADVANTAGE / Plan:  BE /053 / Product Type:  MEDICARE ADVANTAGE    Preferred Delivery Method   Call when ready for pickup - phone number to notify: [phone]     Caller Information       Type Contact Phone/Fax    11/14/2022 01:49 PM CST Phone (Incoming) ZAID HENRY (Emergency Contact) [phone]          Alternative phone number: none    Can a detailed message be left? Yes    Message Turnaround:     [place]:    Please give this turnaround time to the caller:   \"This message will be sent to [state Provider's name]. The clinical team will fulfill your request as soon as they review your message.\"  
EMR reports "The patient is a 51 year old man, domiciled with mother, unemployed, PHx of depression (prescribed Sertraline 50mg and Hydroxyzine 10mg), no hx of hospitalizations, no substance use, no history of SA or NSSIB, PMHx of GERD, brought in by brother for worsening paranoia and insomnia."

## 2022-11-16 NOTE — BH PATIENT PROFILE - NSVRISKTHREATS_PSY_ALL_CORE
Notification Instructions: Patient will be notified of biopsy results. However, patient instructed to call the office if not contacted within 2 weeks. No

## 2024-06-10 NOTE — BH INPATIENT PSYCHIATRY DISCHARGE NOTE - NSBHMETABOLIC_PSY_ALL_CORE_FT
DISPLAY PLAN FREE TEXT BMI: BMI (kg/m2): 10.9 (07-03-22 @ 04:32)  HbA1c: A1C with Estimated Average Glucose Result: 5.7 % (07-07-22 @ 08:13)    Glucose:   BP: --  Lipid Panel: Date/Time: 07-07-22 @ 08:13  Cholesterol, Serum: 206  Direct LDL: --  HDL Cholesterol, Serum: 35  Total Cholesterol/HDL Ration Measurement: --  Triglycerides, Serum: 149

## 2024-07-09 NOTE — ED BEHAVIORAL HEALTH ASSESSMENT NOTE - HPI (INCLUDE ILLNESS QUALITY, SEVERITY, DURATION, TIMING, CONTEXT, MODIFYING FACTORS, ASSOCIATED SIGNS AND SYMPTOMS)
0
The patient is a 51 year old man, domiciled with mother, unemployed, PHx of depression (prescribed Sertraline 50mg and Hydroxyzine 10mg), no hx of hospitalizations, no substance use, no history of SA or NSSIB, PMHx of GERD, brought in by brother for worsening paranoia and insomnia.      On assessment the patient is difficult to understand and unable to give a full history. He asks odd questions about "wonder woman," and then jump up from his bed to imitate a soldier's march. He does not know why he is in the hospital. He says one night last week he only slept 1 hour. Denies SI/HI/AH/VH. When asked about hearing voices or hallucinations he response by explaining how loud his own voice is. He jumps from one topic to the next very quickly and is difficult to follow. He talks about his girlfriend who works in the hospital, which his brother confirms is not true.    Collateral information received from patient's brother, Tomasz ([phone]):  Per SW note:  Pt's brother reports that pt has been very paranoid in recent weeks.  States he has the belief that the [facility] is after his uncle in [place].  He also states that pt has the belief that he and his brothers killed their father, however this is not true.  He states that pt has also been demanding of his mother-states he demands her credit card, uses it to buy various items and has taken cash out on the card, and when pt's mother tells him no, pt becomes very demanding, agitated, and verbally aggressive.  He states that pt does not hit her, but berates her in an aggressive manner.  As per brother, today pt was found to be screaming in the street and not making much sense.  He reports no particular psychiatric history, but states that pt's PCP has told him he needs 'psychiatric' help.  Brother states that pt is prescribed sertraline 50 mg and hydroxyzine 10 mg.  Brother reports that pt is not sleeping, calls him all hours of the night, and cannot keep a job as he is not able to relate well with others and often thinks people are out to get him.  Pt's brother also states that pt does not drink or use drugs.  He states that pt lives at home with his mother as well as his brother and his family.  Brother reports that pt's mother is concerned about pt being in the home due to his unpredictable behaviors and escalating aggression towards her.    Brother contacted by MD writer for further information:  He reports the patient has a history of GERD and was taking omeprazole in the past. He also reports that the patient has exhibited behaviors like this before, when their father  in September of last year. He reports then the patient began acting weird and not making sense, which is when his doctor began treating him psychiatrically. He is worried for the safety of their mother as she is disabled.

## 2024-11-04 NOTE — ED ADULT TRIAGE NOTE - NSWEIGHTCALCTOOLDRUG_GEN_A_CORE
MEDICATIONS  (STANDING):  acetaminophen   IVPB .. 1000 milliGRAM(s) IV Intermittent once  atorvastatin 20 milliGRAM(s) Oral at bedtime  chlorhexidine 4% Liquid 1 Application(s) Topical daily  dexMEDEtomidine Infusion 0.2 MICROgram(s)/kG/Hr (3.74 mL/Hr) IV Continuous <Continuous>  doxazosin 2 milliGRAM(s) Oral at bedtime  multivitamin/minerals/iron Oral Solution (CENTRUM) 15 milliLiter(s) Oral daily  piperacillin/tazobactam IVPB.. 3.375 Gram(s) IV Intermittent every 8 hours  polyethylene glycol 3350 17 Gram(s) Oral daily  QUEtiapine 50 milliGRAM(s) Oral every 8 hours  senna 2 Tablet(s) Oral at bedtime  thiamine Injectable 100 milliGRAM(s) IV Push daily  traZODone 100 milliGRAM(s) Oral at bedtime  valproate sodium   IVPB 250 milliGRAM(s) IV Intermittent every 8 hours    MEDICATIONS  (PRN):  ondansetron Injectable 4 milliGRAM(s) IV Push every 6 hours PRN Nausea and/or Vomiting  oxyCODONE    IR 5 milliGRAM(s) Oral every 4 hours PRN Moderate Pain (4 - 6)  oxyCODONE    IR 10 milliGRAM(s) Oral every 4 hours PRN Severe Pain (7 - 10)
 used

## 2024-12-19 ENCOUNTER — EMERGENCY (EMERGENCY)
Facility: HOSPITAL | Age: 54
LOS: 1 days | Discharge: HOME | End: 2024-12-19
Attending: EMERGENCY MEDICINE | Admitting: EMERGENCY MEDICINE
Payer: MEDICAID

## 2024-12-19 VITALS
HEART RATE: 79 BPM | TEMPERATURE: 98 F | DIASTOLIC BLOOD PRESSURE: 74 MMHG | SYSTOLIC BLOOD PRESSURE: 107 MMHG | OXYGEN SATURATION: 95 % | HEIGHT: 63 IN | RESPIRATION RATE: 18 BRPM | WEIGHT: 205.03 LBS

## 2024-12-19 DIAGNOSIS — Z98.890 OTHER SPECIFIED POSTPROCEDURAL STATES: Chronic | ICD-10-CM

## 2024-12-19 PROCEDURE — 99284 EMERGENCY DEPT VISIT MOD MDM: CPT

## 2024-12-19 PROCEDURE — 70450 CT HEAD/BRAIN W/O DYE: CPT | Mod: 26,MC

## 2024-12-19 PROCEDURE — 70450 CT HEAD/BRAIN W/O DYE: CPT | Mod: MC

## 2024-12-19 PROCEDURE — 99284 EMERGENCY DEPT VISIT MOD MDM: CPT | Mod: 25

## 2024-12-19 RX ORDER — ACETAMINOPHEN 500MG 500 MG/1
650 TABLET, COATED ORAL ONCE
Refills: 0 | Status: COMPLETED | OUTPATIENT
Start: 2024-12-19 | End: 2024-12-19

## 2024-12-19 RX ADMIN — ACETAMINOPHEN 500MG 650 MILLIGRAM(S): 500 TABLET, COATED ORAL at 19:05

## 2024-12-19 NOTE — ED ADULT NURSE NOTE - NSFALLHARMRISKINTERV_ED_ALL_ED

## 2024-12-19 NOTE — ED ADULT TRIAGE NOTE - WEIGHT IN KG
Detail Level: Detailed Render Risk Assessment In Note?: no Other (Free Text): Empirically treated right eyebrow with efudex to r/o AK - patient states no response to treatment. Advised likely just seb derm - can use protopic daily.\\n\\nRecently patient noticed a rough spot above left eyebrow, she she tried efudex on that area and it turned red. She’s been using efudex on this spot QD for 8 days. Advised to complete a few more days and then d/c. Reeval at next visit 93 Note Text (......Xxx Chief Complaint.): This diagnosis correlates with the

## 2024-12-19 NOTE — ED ADULT NURSE NOTE - NSFALLRISKFACTORS_ED_ALL_ED
----- Message from Viviana Walls sent at 4/30/2018 12:31 PM CDT -----  Contact: Otilia [phone]  Mom [phone]--------calling to abdiaziz the pt appt for 05/25 at 10 am and not the 05/22 appt 2 pm. Mom is requesting a call back as soon as possible to confirm that 05/25 will be fine so she can make work provisions  
Spoke with mom, rescheduled appointment for 5/22/18 to 6/1/2018. Mom wanted it to be on a Friday so that her  could also be there.    
recent fall/Other

## 2024-12-19 NOTE — ED ADULT NURSE NOTE - OBJECTIVE STATEMENT
Pt received in 13B 54y male AXO 4 is ambulatory from home c/o fall. Pt states he slipped and fell yesterday at 7/11. Pt endortses hitting head. Upon assessment pt has abrasions on right side of face. CT performed. pending disposition.

## 2024-12-19 NOTE — ED PROVIDER NOTE - PATIENT PORTAL LINK FT
You can access the FollowMyHealth Patient Portal offered by [facility] by registering at the following website: [email or web address]. By joining [employer]’s FollowMyHealth portal, you will also be able to view your health information using other applications (apps) compatible with our system.

## 2024-12-19 NOTE — ED PROVIDER NOTE - NS ED ATTENDING STATEMENT MOD
Please triage.  If no fever, blood, signs of sepsis or need to be seen or sent to ED then Cipro 500 mg one BID x 10 days please   Attending Only

## 2024-12-19 NOTE — ED PROVIDER NOTE - OBJECTIVE STATEMENT
53yo male with head injury after tripping from [facility]  7-11 yesterday no LOC, pt c/o headache no dizziness no neck or back pain no other complaints

## 2025-07-02 ENCOUNTER — INPATIENT (INPATIENT)
Facility: HOSPITAL | Age: 55
LOS: 6 days | Discharge: HOME | DRG: 418 | End: 2025-07-09
Attending: INTERNAL MEDICINE | Admitting: INTERNAL MEDICINE
Payer: MEDICAID

## 2025-07-02 VITALS
DIASTOLIC BLOOD PRESSURE: 71 MMHG | RESPIRATION RATE: 18 BRPM | HEART RATE: 61 BPM | TEMPERATURE: 98 F | OXYGEN SATURATION: 94 % | HEIGHT: 63 IN | SYSTOLIC BLOOD PRESSURE: 104 MMHG | WEIGHT: 205.03 LBS

## 2025-07-02 DIAGNOSIS — R74.01 ELEVATION OF LEVELS OF LIVER TRANSAMINASE LEVELS: ICD-10-CM

## 2025-07-02 DIAGNOSIS — Z29.9 ENCOUNTER FOR PROPHYLACTIC MEASURES, UNSPECIFIED: ICD-10-CM

## 2025-07-02 DIAGNOSIS — N32.81 OVERACTIVE BLADDER: ICD-10-CM

## 2025-07-02 DIAGNOSIS — R79.89 OTHER SPECIFIED ABNORMAL FINDINGS OF BLOOD CHEMISTRY: ICD-10-CM

## 2025-07-02 DIAGNOSIS — E78.5 HYPERLIPIDEMIA, UNSPECIFIED: ICD-10-CM

## 2025-07-02 DIAGNOSIS — I10 ESSENTIAL (PRIMARY) HYPERTENSION: ICD-10-CM

## 2025-07-02 DIAGNOSIS — Z98.890 OTHER SPECIFIED POSTPROCEDURAL STATES: Chronic | ICD-10-CM

## 2025-07-02 LAB
ALBUMIN SERPL ELPH-MCNC: 3.5 G/DL — SIGNIFICANT CHANGE UP (ref 3.3–5)
ALP SERPL-CCNC: 515 U/L — HIGH (ref 40–120)
ALT FLD-CCNC: 1132 U/L — HIGH (ref 12–78)
ANION GAP SERPL CALC-SCNC: 4 MMOL/L — LOW (ref 5–17)
APTT BLD: 32 SEC — SIGNIFICANT CHANGE UP (ref 26.1–36.8)
AST SERPL-CCNC: 742 U/L — HIGH (ref 15–37)
BASOPHILS # BLD AUTO: 0.03 K/UL — SIGNIFICANT CHANGE UP (ref 0–0.2)
BASOPHILS NFR BLD AUTO: 0.6 % — SIGNIFICANT CHANGE UP (ref 0–2)
BILIRUB DIRECT SERPL-MCNC: 1.2 MG/DL — HIGH (ref 0–0.3)
BILIRUB INDIRECT FLD-MCNC: 1 MG/DL — SIGNIFICANT CHANGE UP (ref 0.2–1)
BILIRUB SERPL-MCNC: 2.2 MG/DL — HIGH (ref 0.2–1.2)
BUN SERPL-MCNC: 12 MG/DL — SIGNIFICANT CHANGE UP (ref 7–23)
CALCIUM SERPL-MCNC: 8.8 MG/DL — SIGNIFICANT CHANGE UP (ref 8.5–10.1)
CHLORIDE SERPL-SCNC: 107 MMOL/L — SIGNIFICANT CHANGE UP (ref 96–108)
CO2 SERPL-SCNC: 28 MMOL/L — SIGNIFICANT CHANGE UP (ref 22–31)
CREAT SERPL-MCNC: 0.96 MG/DL — SIGNIFICANT CHANGE UP (ref 0.5–1.3)
EGFR: 95 ML/MIN/1.73M2 — SIGNIFICANT CHANGE UP
EGFR: 95 ML/MIN/1.73M2 — SIGNIFICANT CHANGE UP
EOSINOPHIL # BLD AUTO: 0.56 K/UL — HIGH (ref 0–0.5)
EOSINOPHIL NFR BLD AUTO: 10.5 % — HIGH (ref 0–6)
GLUCOSE SERPL-MCNC: 118 MG/DL — HIGH (ref 70–99)
HCT VFR BLD CALC: 43 % — SIGNIFICANT CHANGE UP (ref 39–50)
HGB BLD-MCNC: 14.5 G/DL — SIGNIFICANT CHANGE UP (ref 13–17)
IMM GRANULOCYTES # BLD AUTO: 0.01 K/UL — SIGNIFICANT CHANGE UP (ref 0–0.07)
IMM GRANULOCYTES NFR BLD AUTO: 0.2 % — SIGNIFICANT CHANGE UP (ref 0–0.9)
INR BLD: 0.98 RATIO — SIGNIFICANT CHANGE UP (ref 0.85–1.16)
LIDOCAIN IGE QN: 172 U/L — HIGH (ref 13–75)
LYMPHOCYTES # BLD AUTO: 1.51 K/UL — SIGNIFICANT CHANGE UP (ref 1–3.3)
LYMPHOCYTES NFR BLD AUTO: 28.4 % — SIGNIFICANT CHANGE UP (ref 13–44)
MCHC RBC-ENTMCNC: 30.3 PG — SIGNIFICANT CHANGE UP (ref 27–34)
MCHC RBC-ENTMCNC: 33.7 G/DL — SIGNIFICANT CHANGE UP (ref 32–36)
MCV RBC AUTO: 89.8 FL — SIGNIFICANT CHANGE UP (ref 80–100)
MONOCYTES # BLD AUTO: 0.37 K/UL — SIGNIFICANT CHANGE UP (ref 0–0.9)
MONOCYTES NFR BLD AUTO: 7 % — SIGNIFICANT CHANGE UP (ref 2–14)
NEUTROPHILS # BLD AUTO: 2.84 K/UL — SIGNIFICANT CHANGE UP (ref 1.8–7.4)
NEUTROPHILS NFR BLD AUTO: 53.3 % — SIGNIFICANT CHANGE UP (ref 43–77)
NRBC # BLD AUTO: 0 K/UL — SIGNIFICANT CHANGE UP (ref 0–0)
NRBC # FLD: 0 K/UL — SIGNIFICANT CHANGE UP (ref 0–0)
NRBC BLD AUTO-RTO: 0 /100 WBCS — SIGNIFICANT CHANGE UP (ref 0–0)
PLATELET # BLD AUTO: 252 K/UL — SIGNIFICANT CHANGE UP (ref 150–400)
PMV BLD: 9.1 FL — SIGNIFICANT CHANGE UP (ref 7–13)
POTASSIUM SERPL-MCNC: 3.5 MMOL/L — SIGNIFICANT CHANGE UP (ref 3.5–5.3)
POTASSIUM SERPL-SCNC: 3.5 MMOL/L — SIGNIFICANT CHANGE UP (ref 3.5–5.3)
PROT SERPL-MCNC: 7.3 G/DL — SIGNIFICANT CHANGE UP (ref 6–8.3)
PROTHROM AB SERPL-ACNC: 11.5 SEC — SIGNIFICANT CHANGE UP (ref 9.9–13.4)
RBC # BLD: 4.79 M/UL — SIGNIFICANT CHANGE UP (ref 4.2–5.8)
RBC # FLD: 14.7 % — HIGH (ref 10.3–14.5)
SODIUM SERPL-SCNC: 139 MMOL/L — SIGNIFICANT CHANGE UP (ref 135–145)
WBC # BLD: 5.32 K/UL — SIGNIFICANT CHANGE UP (ref 3.8–10.5)
WBC # FLD AUTO: 5.32 K/UL — SIGNIFICANT CHANGE UP (ref 3.8–10.5)

## 2025-07-02 PROCEDURE — 83690 ASSAY OF LIPASE: CPT

## 2025-07-02 PROCEDURE — 80076 HEPATIC FUNCTION PANEL: CPT

## 2025-07-02 PROCEDURE — 74177 CT ABD & PELVIS W/CONTRAST: CPT

## 2025-07-02 PROCEDURE — 99285 EMERGENCY DEPT VISIT HI MDM: CPT

## 2025-07-02 PROCEDURE — 85025 COMPLETE CBC W/AUTO DIFF WBC: CPT

## 2025-07-02 PROCEDURE — 74177 CT ABD & PELVIS W/CONTRAST: CPT | Mod: 26

## 2025-07-02 PROCEDURE — 85610 PROTHROMBIN TIME: CPT

## 2025-07-02 PROCEDURE — 80048 BASIC METABOLIC PNL TOTAL CA: CPT

## 2025-07-02 PROCEDURE — 76705 ECHO EXAM OF ABDOMEN: CPT

## 2025-07-02 PROCEDURE — 85730 THROMBOPLASTIN TIME PARTIAL: CPT

## 2025-07-02 PROCEDURE — 76705 ECHO EXAM OF ABDOMEN: CPT | Mod: 26

## 2025-07-02 PROCEDURE — 36415 COLL VENOUS BLD VENIPUNCTURE: CPT

## 2025-07-02 RX ORDER — HEPARIN SODIUM 1000 [USP'U]/ML
5000 INJECTION INTRAVENOUS; SUBCUTANEOUS ONCE
Refills: 0 | Status: COMPLETED | OUTPATIENT
Start: 2025-07-02 | End: 2025-07-02

## 2025-07-02 RX ORDER — OXYBUTYNIN CHLORIDE 5 MG/1
10 TABLET, FILM COATED, EXTENDED RELEASE ORAL DAILY
Refills: 0 | Status: DISCONTINUED | OUTPATIENT
Start: 2025-07-02 | End: 2025-07-02

## 2025-07-02 RX ORDER — MAGNESIUM, ALUMINUM HYDROXIDE 200-200 MG
30 TABLET,CHEWABLE ORAL EVERY 4 HOURS
Refills: 0 | Status: DISCONTINUED | OUTPATIENT
Start: 2025-07-02 | End: 2025-07-09

## 2025-07-02 RX ORDER — MELATONIN 5 MG
3 TABLET ORAL AT BEDTIME
Refills: 0 | Status: DISCONTINUED | OUTPATIENT
Start: 2025-07-02 | End: 2025-07-09

## 2025-07-02 RX ORDER — ONDANSETRON HCL/PF 4 MG/2 ML
4 VIAL (ML) INJECTION EVERY 8 HOURS
Refills: 0 | Status: DISCONTINUED | OUTPATIENT
Start: 2025-07-02 | End: 2025-07-09

## 2025-07-02 RX ORDER — OXYBUTYNIN CHLORIDE 5 MG/1
10 TABLET, FILM COATED, EXTENDED RELEASE ORAL DAILY
Refills: 0 | Status: DISCONTINUED | OUTPATIENT
Start: 2025-07-02 | End: 2025-07-09

## 2025-07-02 RX ORDER — OXYBUTYNIN CHLORIDE 5 MG/1
1 TABLET, FILM COATED, EXTENDED RELEASE ORAL
Refills: 0 | DISCHARGE

## 2025-07-02 RX ADMIN — Medication 1000 MILLILITER(S): at 18:00

## 2025-07-02 RX ADMIN — HEPARIN SODIUM 5000 UNIT(S): 1000 INJECTION INTRAVENOUS; SUBCUTANEOUS at 22:56

## 2025-07-02 RX ADMIN — Medication 75 MILLILITER(S): at 22:15

## 2025-07-02 RX ADMIN — Medication 75 MILLILITER(S): at 22:56

## 2025-07-02 NOTE — ED PROVIDER NOTE - DISPOSITION TYPE
Breathing spontaneous and unlabored. Breath sounds clear and equal bilaterally with regular rhythm. ADMIT

## 2025-07-02 NOTE — PATIENT PROFILE ADULT - FUNCTIONAL ASSESSMENT - DAILY ACTIVITY 1.
"ANTICOAGULATION MANAGEMENT     Jose M Rea 75 year old male is on warfarin with subtherapeutic INR result. (Goal INR 2.0-3.0)    Recent labs: (last 7 days)     05/09/22  1414   INR 1.8*       ASSESSMENT       Source(s): Chart Review and Patient/Caregiver Call       Warfarin doses taken: Warfarin taken as instructed    Diet: No new diet changes identified    New illness, injury, or hospitalization: Yes: ED - arm injury, infectious eczematoid - pt states that it is \"much better\" and has improved   Head cold Sx as well    Medication/supplement changes: antibiotic  - finished Augmentin on Saturday    Signs or symptoms of bleeding or clotting: No    Previous INR: Therapeutic last 2(+) visits    Additional findings: pt states that sometimes he gets swelling around his ankles from his socks - will discuss with provider     PLAN     Recommended plan for temporary change(s) affecting INR     Dosing Instructions: continue your current warfarin dose with next INR in 2 weeks       Summary  As of 5/9/2022    Full warfarin instructions:  10 mg every Mon; 7.5 mg all other days   Next INR check:  5/23/2022             Telephone call with Jose M who verbalizes understanding and agrees to plan    Lab visit scheduled    Education provided: Please call back if any changes to your diet, medications or how you've been taking warfarin and Monitoring for clotting signs and symptoms    Plan made per [facility] anticoagulation protocol    Salma Diane RN  Anticoagulation Clinic  5/9/2022    _______________________________________________________________________     Anticoagulation Episode Summary     Current INR goal:  2.0-3.0   TTR:  95.5 % (1 y)   Target end date:  Indefinite   Send INR reminders to:  [facility]    Indications    Coagulation defect (H) [D68.9]  Acute deep vein thrombosis (DVT) of proximal vein of lower extremity (H) (Resolved) [I82.4Y9]  Long term current use of anticoagulant therapy [Z79.01]  Acute deep vein " thrombosis (DVT) of proximal vein of lower extremity  unspecified laterality (H) (Resolved) [I82.4Y9]  Chronic anticoagulation [Z79.01]           Comments:           Anticoagulation Care Providers     Provider Role Specialty Phone number    Pj Quijano MD Referring Family Medicine [phone]    Rigoberto Hernandez MD Referring Family Medicine [phone]             4 = No assist / stand by assistance

## 2025-07-02 NOTE — H&P ADULT - RESPIRATORY
normal/clear to auscultation bilaterally/no wheezes/no rales/no rhonchi normal/clear to auscultation bilaterally/no wheezes/no rales/no rhonchi/no respiratory distress/no use of accessory muscles/breath sounds equal/respirations non-labored

## 2025-07-02 NOTE — ED PROVIDER NOTE - IV ALTEPLASE EXCL REL HIDDEN
[FreeTextEntry1] : Briefly, 71 y/o M w/ h/o CAD s/p late-presenting MI 2005 s/p PCI LAD, ICM (EF 25%, LVEDD 5.4 cm), s/p single chamber ICD with lead revision in 2018, bioprosthetic AVR (2015) 2/2 AI, now s/p HECTOR  for severe bioprosthetic AS 8/20/20, aflutter ablation 7/17, persistent afib, s/p DCCV 8/12/20 who is ACC/AHA Stage C, NYHA Class II who is low-normotensive and appears to be mildly hypovolemic due to over diuresis with FEDERICO\par \par 1. ICM, HFrEF \par - Stop Torsemide and allow for volume status to equilibrate \par - Continue Entresto 24-26 mg BID for now\par - Continue Sprinolactone 25 mg QD\par - Continue Toprol XL 25 mg QD; prev on coreg 25 mg bid in 2018; had been on toprol xl 1/2 twice/day; couldn't tolerate 25 mg twice/day \par - per ICD interrogation during admission, he was  44%, which could also be contributing to his HF syndrome and can consider CRT upgrade, although appears to be improving post TAVR\par - Labs today with FEDERICO and pro-BNP of 1050 compared to 1755 on 8/27; Will repeat labs 9/28 at which time will consider uptitration of Entresto depending on his response. \par \par 2. FEDERICO\par - Labs from 9/19 with BUN/SCr of 33/1.40 compared to 23/1.13 on 8/27\par - Likely in setting of volume depletion, management as above\par \par 3. Aflutter \par - currently in afib (per last ECG), rate controlled\par - continue eliquis 5 mg bid\par - off digoxin since his DCCV 8/2020, c/w amiodarone per Dr. Apple\par \par 4. Bioprosthetic aortic stenosis, s/p valve in valve TAVR\par - continue to f/u with Dr. Paez and Dr. Waldrop\par \par 5. CAD - s/p MI\par - continue ASA 81 mg daily and lipitor 40 mg daily\par \par 6. RLL nodule\par - underwent PET/CT on 9/16 per thoracic surgery, Dr. Waggoner; awaiting to review results this week\par - he will also follow-up with his pulmonologist, Dr. Guerrero in [place]\par \par RTC in 3 weeks with the HF NP for further medication uptitration and as scheduled with Dr. Quiñones in 7 weeks show

## 2025-07-02 NOTE — H&P ADULT - PROBLEM SELECTOR PLAN 1
Pt sent to ED by PCP for transaminitis  - Afebrile, no leukocytosis. Denies abdominal pain at present  - Elevated Tbili 2.2, D bili 1.2, Alk phos 515, AST//1132, lipase 172  - CT A/P with IV contrast: gallbladder is moderately distended. No definite gallstones or gallbladder wall thickening. Increased caliber of the extrahepatic CBD measures up to 1.4 cm, with mild intrahepatic biliary prominence.   - Admit to medicine for MRCP  - NPO for procedure  - s/p 1000cc bolus x1 in ED  - c/w mIVF @ 75cc/hr while NPO  - f/u RUQ US  - f/u acute hepatitis panel  - Trend LFTs  - Hold home statin  - Avoid hepatotoxic meds  - GI (Dr. Solorio) consulted, recs appreciated- plan for MRCP  - Surgery (Dr. Johnson)  consulted, recs appreciated- f/u RUQ US Pt sent to ED by PCP for transaminitis-elevated LFI's  - Afebrile, no leukocytosis. Denies abdominal pain at present  - Elevated Tbili 2.2, D bili 1.2, Alk phos 515, AST//1132, lipase 172  - CT A/P with IV contrast: gallbladder is moderately distended. No definite gallstones or gallbladder wall thickening. Increased caliber of the extrahepatic CBD measures up to 1.4 cm, with mild intrahepatic biliary prominence.   - Admit to medicine for MRCP A?P   - NPO for procedure  - s/p 1000cc bolus x1 in ED  - c/w mIVF @ 75cc/hr while NPO  - f/u RUQ US  - f/u acute hepatitis panel  - Trend LFTs  - Hold home statin  - Avoid hepatotoxic meds  - GI (Dr. Solorio) consulted, recs appreciated- plan for MRCP  - Surgery (Dr. Johnson)  consulted, recs appreciated- f/u RUQ US

## 2025-07-02 NOTE — ED ADULT TRIAGE NOTE - CHIEF COMPLAINT QUOTE
A&Ox4 to triage sent from PCP Dar Meraz for abnormal lab results and is having issues with his liver as per brother who is translating as per pt request

## 2025-07-02 NOTE — H&P ADULT - MUSCULOSKELETAL
normal/ROM intact/normal gait/strength 5/5 bilateral upper extremities/strength 5/5 bilateral lower extremities negative normal/ROM intact/no joint swelling/no joint erythema/calf tenderness/normal gait/strength 5/5 bilateral upper extremities/strength 5/5 bilateral lower extremities

## 2025-07-02 NOTE — H&P ADULT - HISTORY OF PRESENT ILLNESS
Pt is a 55 yo M PMHx depression (possible Bipolar I?), HLD, HTN (not on medication), HLD, overactive bladder? presenting to ED for abnormal labs. Pt saw his PCP and got lab work, showing elevated LFTs so was instructed to go to ED. Pt has no acute complaints at present, denies abdominal pain, fever, chills, n/v/d/c.    ED Course:   Vitals: BP: 104/71, HR: 61, Temp: 98.1F, RR: 18, SpO2: 94% on RA   Labs:  WBC 5.32, Hg 14.5, K 3.5, Gluc 118, Tbili 2.2, D bili 1.2, Alk phos 515, AST//1132, lipase 172  CT: Gallbladder is moderately distended. No definite gallstones or gallbladder wall thickening. Increased caliber of the extrahepatic CBD measures up to 1.4 cm, with mild intrahepatic biliary prominence.   Received in the ED: 1000cc NS bolus x1

## 2025-07-02 NOTE — ED ADULT NURSE NOTE - ABDOMEN
Occupational Therapy  Visit Type: treatment    Relevant History/Co-morbidities: -6/12/24: 74 year old female admit from MD office where she had received a C6-7 JOHN with sudden onset cervical neck pain, left sided weakness, and parasthesias. MRI revelaed intrameduallyar cervical spine hemorrhage at C4-6  -6/13/24: underwent C3-6 Lami and C3-T6 PSF  6/18/24: transfer to 6W rehab    SUBJECTIVE  Patient agreed to participate in therapy this date.  Patient verbally agrees to allow the following to be present during session: daughter  \"I like to work hard.\"  Patient / Family Goal: maximize function and return to previous functional status    Pain     Location: neck and shoulder; received Tylenol, per pt.    At onset of session (out of 10): 8    OBJECTIVE     Cognitive Status   Arousal Alertness   - appropriate responses to stimuli  Affect/Behavior    - calm, cooperative and pleasant  Orientation    - Oriented to: person, place, time and situation  Functional Communication   - Overall Communication Status: within functional limits   - Forms of Communication: verbal  Attention Span    - Attention: intact  Following Direction   - follows all commands and directions consistently  Memory   - intact  Safety Awareness/Insight   - intact  Awareness of Deficits   - fully aware of deficits  Verbal Expression   - intact    Patient Activity Tolerance: 1 to 1 activity to rest    Hand Dominance: right-handed    UE Function:     Left: stabilizer     Right: normal             Interventions  Neuromuscular Re-Education  Facilitation and strengthening exercises Left upper extremity to increase functional use:  -resistive clothespins, improved, now able to pinch yellow, red and green resistances with her left hand on horizontal yardstick. Right hand completed green blue and black on vertical yardstick.  -reciprocal pulleys, approximately 3 minutes with full shoulder range of motion achieved.  -yellow theraputty provided; completed ,  pinch and finger extension    Fine Motor / Coordination  Left hand fine motor coordination to increase functional use for activity of daily living and instrumental activity of daily living tasks:  -In hand gathering and transference from palm to finger tips to place into slotted container.   -hi-Q peg manipulation, placed in and removed small pegs from pegboard  -lacing card, able to use left hand to complete task with additional time  Skilled input: verbal instruction/cues, tactile instruction/cues, as detailed above and facilitation         ASSESSMENT  Impairments: activity tolerance, balance, bed mobility, communication, decreased insight into deficits, strength, pain, body habitus, abnormal tone, range of motion, safety awareness and sensation  Functional Limitations: bed mobility, functional mobility, toileting, IADLs, community reintegration, dressing, showering, participating in meaningful/purposeful activities, functional transfers, bathing, eating and grooming        Discharge Recommendations:  PT/OT Mobility Equipment for Discharge: to be determined  PT/OT ADL Equipment for Discharge: reacher, sock aid, long shoe horn, toilet tongs, LH sponge, dressing stick    Progress: progressing toward goals    Therapy Participation: This patient participated in all scheduled occupational therapy time this session.    Education:   - Present and ready to learn: patient  Education provided during session:  - Results of above outlined education: Verbalizes understanding, Demonstrates understanding and Needs reinforcement    Patient at End of Session:   Location: in wheelchair  Safety measures: alarm system in place/re-engaged and call light within reach  Handoff to: nurse, nurse assistant and family/caregiver (Handoff to [facility] in therpy gym for transport to speech therapy)    PLAN  Suggestions for next session as indicated:     OT Frequency: 5 days/week, 6 days/week, 7 days/week   Frequency Comments: 1-1.5 hours/day    Duration: 7/3/2024   Interventions: activity tolerance training, ADL retraining, balance, bed mobility training, body mechanics, compensatory techniques, community integration, continued evaluation, IADL, functional transfer training, energy conservation, safety training, positioning, therapeutic activity, patient/family training, HEP training, patient education, transfer training, use of adaptive equipment, therapeutic exercise, compensatory technique education, upper extremity strengthening/ROM and fine motor coordination activities  Agreement to plan and goals: patient agrees with goals and treatment plan      GOALS  Review Date: 6/26/2024  Short Term Goals (STGs): to be met 7 days from date established, unless otherwise stated.  - Patient will complete grooming at set-up level assist with adaptive equipment as deemed appropriate.  - Patient will complete upper body dressing at minimal assist level with adaptive equipment as deemed appropriate.  - Patient will complete lower body dressing at maximal assist level with adaptive equipment as deemed appropriate.  - Patient will complete toilet transfer at contact guard assist level with adaptive equipment as deemed appropriate.  Status: all STGs met  Long Term Goals (LTGs): to be met by discharge from rehab program.  - Patient will complete grooming at supervision level with adaptive equipment as deemed appropriate.  - Patient will complete upper body dressing at supervision level with adaptive equipment as deemed appropriate.  - Patient will complete lower body dressing at supervision level with adaptive equipment as deemed appropriate. 6/26/24 Discontinue goal- no longer a patient goal  - Patient will complete toilet transfer at supervision level with adaptive equipment as deemed appropriate.  Status: all LTGs progressing except as noted  Documented in the chart in the following areas: Assessment/Plan. Plan.      Therapy procedure time and total treatment time  can be found documented on the Time Entry flowsheet   soft

## 2025-07-02 NOTE — H&P ADULT - PROBLEM SELECTOR PLAN 4
Chronic  - Not currently on home medications  - Monitor routine hemodynamics  - DASH/TLC diet when taking PO

## 2025-07-02 NOTE — H&P ADULT - CARDIOVASCULAR
normal/regular rate and rhythm/S1 S2 present/no murmur normal/regular rate and rhythm/S1 S2 present/no gallops/no murmur/no JVD/no pedal edema/vascular details…

## 2025-07-02 NOTE — ED PROVIDER NOTE - CLINICAL SUMMARY MEDICAL DECISION MAKING FREE TEXT BOX
54-year-old male with no significant past medical history presents with presented to his physician for a routine physical yesterday.  The patient had lab work drawn, and was sent to the ED today because he was told that he had some liver abnormalities.  The patient has been otherwise asymptomatic.  No acute chest pain.  No shortness of breath.  No dyspnea on exertion or easy fatigue.  No orthopnea.  No abdominal pain or distention.  Normal appetite.  No acute vomiting or diarrhea.  No recent trauma.  No dysuria/hematuria.  No color change to the urine.  No aggravating or alleviating factors otherwise noted.  No other acute complaints.  Exam: Nontoxic, well-appearing.  Normal respiratory effort.  CTA BL, no W/R/R.  Normal cardiac exam.  Abdomen soft, nontender, nondistended.  No HSM.  No CVAT.  Normal nonfocal neurologic exam.  No C/C/A.  No other acute findings on exam.  Acute abnormal LFTs on outpatient labs.  Will check repeat labs, possible imaging.

## 2025-07-02 NOTE — ED PROVIDER NOTE - PROGRESS NOTE DETAILS
labs and CT discussed with GI attending Dr. Solorio - recommend admission for further evaluation, surgery consult, admission    spoke with surgery PA who will see patient

## 2025-07-02 NOTE — H&P ADULT - ASSESSMENT
Pt is a 55 yo M PMHx depression (possible Bipolar I?), HLD, HTN (not on medication), HLD, overactive bladder? presenting to ED for abnormal labs. Admitted for transaminitis. Pt is a 55 yo M PMHx depression , HLD, HTN (not on medication), HLD, overactive bladder?  presenting  to ED for abnormal labs. Admitted for transaminitis.

## 2025-07-02 NOTE — H&P ADULT - PROBLEM SELECTOR PLAN 2
Chronic  - c/w home zoloft  - Possible Bipolar I disorder, pt hospitalized at [facility] for psychotic episode- discharged on Risperdal 3mg qhs, Depakote DR 500mg BID, Klonopin to 0.5mg qhs. Per pt no longer taking

## 2025-07-02 NOTE — H&P ADULT - ATTENDING COMMENTS
t is a 53 yo M PMHx depression , HLD, HTN (not on medication), HLD, overactive bladder?  presenting  to ED for abnormal labs. Admitted for transaminitis.  Pt seen Examined, case & care plan d/w pt residents at detail.  Consult-  GI-Dr Solorio -MRCP A/P  NPO-IVF   DVT ppx   AM labs Pt is a 55 yo M PMHx depression , HLD, HTN (not on medication), HLD, overactive bladder?  presenting  to ED for abnormal labs. Admitted for transaminitis.  Pt seen Examined, case & care plan d/w pt residents at detail.  Consult-  GI-Dr Solorio -MRCP A/P  NPO-IVF   DVT ppx   AM labs

## 2025-07-02 NOTE — ED ADULT NURSE NOTE - OBJECTIVE STATEMENT
Patient received complaining of abnormal lab values from blood work done yesterday at PCP. In no acute distress at this time. Patient is AOx4, ambulatory safety precautions in place, awaiting evaluation

## 2025-07-02 NOTE — H&P ADULT - NEUROLOGICAL
AAOx 4/normal/cranial nerves II-XII intact/deep reflexes intact/no spontaneous movement/superficial reflexes intact details…

## 2025-07-02 NOTE — H&P ADULT - GASTROINTESTINAL
normal/soft/nontender/nondistended/normal active bowel sounds details… Obese/normal/soft/nontender/nondistended/normal active bowel sounds/no guarding/no organomegaly/no palpable rosalina/no masses palpable

## 2025-07-03 ENCOUNTER — TRANSCRIPTION ENCOUNTER (OUTPATIENT)
Age: 55
End: 2025-07-03

## 2025-07-03 LAB
ALBUMIN SERPL ELPH-MCNC: 3.4 G/DL — SIGNIFICANT CHANGE UP (ref 3.3–5)
ALP SERPL-CCNC: 536 U/L — HIGH (ref 40–120)
ALT FLD-CCNC: 1060 U/L — HIGH (ref 12–78)
ANION GAP SERPL CALC-SCNC: 7 MMOL/L — SIGNIFICANT CHANGE UP (ref 5–17)
APTT BLD: 32.3 SEC — SIGNIFICANT CHANGE UP (ref 26.1–36.8)
AST SERPL-CCNC: 596 U/L — HIGH (ref 15–37)
BASOPHILS # BLD AUTO: 0.03 K/UL — SIGNIFICANT CHANGE UP (ref 0–0.2)
BASOPHILS NFR BLD AUTO: 0.5 % — SIGNIFICANT CHANGE UP (ref 0–2)
BILIRUB SERPL-MCNC: 2.7 MG/DL — HIGH (ref 0.2–1.2)
BUN SERPL-MCNC: 8 MG/DL — SIGNIFICANT CHANGE UP (ref 7–23)
CALCIUM SERPL-MCNC: 8.4 MG/DL — LOW (ref 8.5–10.1)
CHLORIDE SERPL-SCNC: 110 MMOL/L — HIGH (ref 96–108)
CO2 SERPL-SCNC: 23 MMOL/L — SIGNIFICANT CHANGE UP (ref 22–31)
CREAT SERPL-MCNC: 0.61 MG/DL — SIGNIFICANT CHANGE UP (ref 0.5–1.3)
EGFR: 115 ML/MIN/1.73M2 — SIGNIFICANT CHANGE UP
EGFR: 115 ML/MIN/1.73M2 — SIGNIFICANT CHANGE UP
EOSINOPHIL # BLD AUTO: 0.45 K/UL — SIGNIFICANT CHANGE UP (ref 0–0.5)
EOSINOPHIL NFR BLD AUTO: 7.7 % — HIGH (ref 0–6)
GLUCOSE SERPL-MCNC: 87 MG/DL — SIGNIFICANT CHANGE UP (ref 70–99)
HAV IGM SER-ACNC: SIGNIFICANT CHANGE UP
HBV CORE IGM SER-ACNC: SIGNIFICANT CHANGE UP
HBV SURFACE AG SER-ACNC: SIGNIFICANT CHANGE UP
HCT VFR BLD CALC: 43.5 % — SIGNIFICANT CHANGE UP (ref 39–50)
HCV AB S/CO SERPL IA: 0.38 S/CO — SIGNIFICANT CHANGE UP (ref 0–0.79)
HCV AB SERPL-IMP: SIGNIFICANT CHANGE UP
HGB BLD-MCNC: 14.3 G/DL — SIGNIFICANT CHANGE UP (ref 13–17)
IMM GRANULOCYTES # BLD AUTO: 0.02 K/UL — SIGNIFICANT CHANGE UP (ref 0–0.07)
IMM GRANULOCYTES NFR BLD AUTO: 0.3 % — SIGNIFICANT CHANGE UP (ref 0–0.9)
INR BLD: 0.96 RATIO — SIGNIFICANT CHANGE UP (ref 0.85–1.16)
LYMPHOCYTES # BLD AUTO: 1.21 K/UL — SIGNIFICANT CHANGE UP (ref 1–3.3)
LYMPHOCYTES NFR BLD AUTO: 20.8 % — SIGNIFICANT CHANGE UP (ref 13–44)
MCHC RBC-ENTMCNC: 29.5 PG — SIGNIFICANT CHANGE UP (ref 27–34)
MCHC RBC-ENTMCNC: 32.9 G/DL — SIGNIFICANT CHANGE UP (ref 32–36)
MCV RBC AUTO: 89.7 FL — SIGNIFICANT CHANGE UP (ref 80–100)
MONOCYTES # BLD AUTO: 0.47 K/UL — SIGNIFICANT CHANGE UP (ref 0–0.9)
MONOCYTES NFR BLD AUTO: 8.1 % — SIGNIFICANT CHANGE UP (ref 2–14)
NEUTROPHILS # BLD AUTO: 3.63 K/UL — SIGNIFICANT CHANGE UP (ref 1.8–7.4)
NEUTROPHILS NFR BLD AUTO: 62.6 % — SIGNIFICANT CHANGE UP (ref 43–77)
NRBC # BLD AUTO: 0 K/UL — SIGNIFICANT CHANGE UP (ref 0–0)
NRBC # FLD: 0 K/UL — SIGNIFICANT CHANGE UP (ref 0–0)
NRBC BLD AUTO-RTO: 0 /100 WBCS — SIGNIFICANT CHANGE UP (ref 0–0)
PLATELET # BLD AUTO: 271 K/UL — SIGNIFICANT CHANGE UP (ref 150–400)
PMV BLD: 9.2 FL — SIGNIFICANT CHANGE UP (ref 7–13)
POTASSIUM SERPL-MCNC: 3.4 MMOL/L — LOW (ref 3.5–5.3)
POTASSIUM SERPL-SCNC: 3.4 MMOL/L — LOW (ref 3.5–5.3)
PROT SERPL-MCNC: 7.1 G/DL — SIGNIFICANT CHANGE UP (ref 6–8.3)
PROTHROM AB SERPL-ACNC: 11.3 SEC — SIGNIFICANT CHANGE UP (ref 9.9–13.4)
RBC # BLD: 4.85 M/UL — SIGNIFICANT CHANGE UP (ref 4.2–5.8)
RBC # FLD: 14.7 % — HIGH (ref 10.3–14.5)
SODIUM SERPL-SCNC: 140 MMOL/L — SIGNIFICANT CHANGE UP (ref 135–145)
WBC # BLD: 5.81 K/UL — SIGNIFICANT CHANGE UP (ref 3.8–10.5)
WBC # FLD AUTO: 5.81 K/UL — SIGNIFICANT CHANGE UP (ref 3.8–10.5)

## 2025-07-03 PROCEDURE — 74177 CT ABD & PELVIS W/CONTRAST: CPT

## 2025-07-03 PROCEDURE — 74183 MRI ABD W/O CNTR FLWD CNTR: CPT

## 2025-07-03 PROCEDURE — 76705 ECHO EXAM OF ABDOMEN: CPT

## 2025-07-03 PROCEDURE — 74183 MRI ABD W/O CNTR FLWD CNTR: CPT | Mod: 26

## 2025-07-03 PROCEDURE — A9579: CPT

## 2025-07-03 PROCEDURE — 36415 COLL VENOUS BLD VENIPUNCTURE: CPT

## 2025-07-03 PROCEDURE — 85730 THROMBOPLASTIN TIME PARTIAL: CPT

## 2025-07-03 PROCEDURE — 83690 ASSAY OF LIPASE: CPT

## 2025-07-03 PROCEDURE — 80074 ACUTE HEPATITIS PANEL: CPT

## 2025-07-03 PROCEDURE — 85610 PROTHROMBIN TIME: CPT

## 2025-07-03 PROCEDURE — 80048 BASIC METABOLIC PNL TOTAL CA: CPT

## 2025-07-03 PROCEDURE — 80053 COMPREHEN METABOLIC PANEL: CPT

## 2025-07-03 PROCEDURE — 80076 HEPATIC FUNCTION PANEL: CPT

## 2025-07-03 PROCEDURE — 85025 COMPLETE CBC W/AUTO DIFF WBC: CPT

## 2025-07-03 RX ORDER — CEFTRIAXONE 500 MG/1
1000 INJECTION, POWDER, FOR SOLUTION INTRAMUSCULAR; INTRAVENOUS EVERY 24 HOURS
Refills: 0 | Status: DISCONTINUED | OUTPATIENT
Start: 2025-07-03 | End: 2025-07-09

## 2025-07-03 RX ORDER — ENOXAPARIN SODIUM 100 MG/ML
40 INJECTION SUBCUTANEOUS ONCE
Refills: 0 | Status: COMPLETED | OUTPATIENT
Start: 2025-07-03 | End: 2025-07-03

## 2025-07-03 RX ADMIN — Medication 75 MILLILITER(S): at 18:38

## 2025-07-03 RX ADMIN — OXYBUTYNIN CHLORIDE 10 MILLIGRAM(S): 5 TABLET, FILM COATED, EXTENDED RELEASE ORAL at 13:35

## 2025-07-03 RX ADMIN — ENOXAPARIN SODIUM 40 MILLIGRAM(S): 100 INJECTION SUBCUTANEOUS at 18:38

## 2025-07-03 RX ADMIN — Medication 40 MILLIEQUIVALENT(S): at 13:35

## 2025-07-03 RX ADMIN — CEFTRIAXONE 100 MILLIGRAM(S): 500 INJECTION, POWDER, FOR SOLUTION INTRAMUSCULAR; INTRAVENOUS at 18:38

## 2025-07-03 NOTE — PROGRESS NOTE ADULT - SUBJECTIVE AND OBJECTIVE BOX
SURGERY DAILY PROGRESS NOTE:     SUBJECTIVE/ROS: Patient feels the same as yesterday. He does endorse some vague abdominal pain with eating, however has been NPO for MRCP. Otherwise denies N/V. Last had a bowel movement yesterday.  Denies nausea, vomiting, chest pain, shortness of breath.     MEDICATIONS  (STANDING):  oxybutynin XL 10 milliGRAM(s) Oral daily  sodium chloride 0.9%. 1000 milliLiter(s) (75 mL/Hr) IV Continuous <Continuous>    MEDICATIONS  (PRN):  aluminum hydroxide/magnesium hydroxide/simethicone Suspension 30 milliLiter(s) Oral every 4 hours PRN Dyspepsia  melatonin 3 milliGRAM(s) Oral at bedtime PRN Insomnia  ondansetron Injectable 4 milliGRAM(s) IV Push every 8 hours PRN Nausea and/or Vomiting      OBJECTIVE:  Vital Signs Last 24 Hrs  T(C): 36.9 (2025 05:00), Max: 36.9 (2025 05:00)  T(F): 98.4 (2025 05:00), Max: 98.4 (2025 05:00)  HR: 60 (2025 05:00) (53 - 61)  BP: 108/69 (2025 05:00) (104/71 - 138/83)  BP(mean): --  RR: 19 (2025 05:00) (18 - 20)  SpO2: 93% (2025 05:00) (93% - 98%)    Parameters below as of 2025 05:00  Patient On (Oxygen Delivery Method): room air          I&O's Detail      Daily Height in cm: 160.02 (2025 16:13)    Daily Weight in k.6 (2025 05:00)    LABS:                        14.3   5.81  )-----------( 271      ( 2025 06:15 )             43.5     07-03    140  |  110[H]  |  8   ----------------------------<  87  3.4[L]   |  23  |  0.61    Ca    8.4[L]      2025 06:15    TPro  7.1  /  Alb  3.4  /  TBili  2.7[H]  /  DBili  x   /  AST  596[H]  /  ALT  1060[H]  /  AlkPhos  536[H]  07-03    PT/INR - ( 2025 06:15 )   PT: 11.3 sec;   INR: 0.96 ratio         PTT - ( 2025 06:15 )  PTT:32.3 sec  Urinalysis Basic - ( 2025 06:15 )    Color: x / Appearance: x / SG: x / pH: x  Gluc: 87 mg/dL / Ketone: x  / Bili: x / Urobili: x   Blood: x / Protein: x / Nitrite: x   Leuk Esterase: x / RBC: x / WBC x   Sq Epi: x / Non Sq Epi: x / Bacteria: x        PHYSICAL EXAM:  Constitutional: well developed, well nourished, NAD  Eyes: anicteric  ENMT: normal facies, symmetric  Respiratory: normal respiratory effort  Cardiovascular: regular rate  Gastrointestinal: abdomen soft, mildly tender in epigastric and RUQ region. Negative guarding or rebound, non-peritonitic.  Extremities: distal pulses intact  Neurological: no gross deficits  Skin: warm, well-perfused    < from: US Abdomen Upper Quadrant Right (25 @ 22:02) >  ACC: [id number] EXAM:  US ABDOMEN RT UPR QUADRANT   ORDERED BY: CONNIE HATCH     PROCEDURE DATE:  2025        INTERPRETATION:  CLINICAL INFORMATION: Abnormal LFTs    COMPARISON: Same day CT abdomen pelvis    TECHNIQUE: Sonography of theright upper quadrant.    FINDINGS:  Liver: Within normal limits.  Bile ducts: Normal caliber. Common bile duct measures 6 mm.  Gallbladder: Within normal limits.  Pancreas: Poorly visualized.  Right kidney: 11.1 cm. No hydronephrosis.  Ascites: None.  IVC: Visualized portions are within normal limits.    IMPRESSION:  Unremarkable right upper quadrant abdominal ultrasound.    < end of copied text >        A: 54y old male with pmhx HTN, pshx appendectomy with partial cecectomy() sent in by PCP due to abnormal lab value. CT with dilated CBD, gallbladder with moderate distention no definite stones or wall thickening. RUQ US benign, CBD 6mm. VSSAF, pt without complaints.  Abdominal exam benign. Morning labs pending.    P:  - Follow up MRCP per GI  - Continue NPO  - IS, OOB  - rest of care per primary   - discussed and seen with Dr. Johnson          SURGERY DAILY PROGRESS NOTE:     SUBJECTIVE/ROS: Patient feels the same as yesterday. He does endorse some vague abdominal pain with eating, however has been NPO for MRCP. Otherwise denies N/V. Last had a bowel movement yesterday.  Denies nausea, vomiting, chest pain, shortness of breath.     MEDICATIONS  (STANDING):  oxybutynin XL 10 milliGRAM(s) Oral daily  sodium chloride 0.9%. 1000 milliLiter(s) (75 mL/Hr) IV Continuous <Continuous>    MEDICATIONS  (PRN):  aluminum hydroxide/magnesium hydroxide/simethicone Suspension 30 milliLiter(s) Oral every 4 hours PRN Dyspepsia  melatonin 3 milliGRAM(s) Oral at bedtime PRN Insomnia  ondansetron Injectable 4 milliGRAM(s) IV Push every 8 hours PRN Nausea and/or Vomiting      OBJECTIVE:  Vital Signs Last 24 Hrs  T(C): 36.9 (2025 05:00), Max: 36.9 (2025 05:00)  T(F): 98.4 (2025 05:00), Max: 98.4 (2025 05:00)  HR: 60 (2025 05:00) (53 - 61)  BP: 108/69 (2025 05:00) (104/71 - 138/83)  BP(mean): --  RR: 19 (2025 05:00) (18 - 20)  SpO2: 93% (2025 05:00) (93% - 98%)    Parameters below as of 2025 05:00  Patient On (Oxygen Delivery Method): room air          I&O's Detail      Daily Height in cm: 160.02 (2025 16:13)    Daily Weight in k.6 (2025 05:00)    LABS:                        14.3   5.81  )-----------( 271      ( 2025 06:15 )             43.5     07-03    140  |  110[H]  |  8   ----------------------------<  87  3.4[L]   |  23  |  0.61    Ca    8.4[L]      2025 06:15    TPro  7.1  /  Alb  3.4  /  TBili  2.7[H]  /  DBili  x   /  AST  596[H]  /  ALT  1060[H]  /  AlkPhos  536[H]  07-03    PT/INR - ( 2025 06:15 )   PT: 11.3 sec;   INR: 0.96 ratio         PTT - ( 2025 06:15 )  PTT:32.3 sec  Urinalysis Basic - ( 2025 06:15 )    Color: x / Appearance: x / SG: x / pH: x  Gluc: 87 mg/dL / Ketone: x  / Bili: x / Urobili: x   Blood: x / Protein: x / Nitrite: x   Leuk Esterase: x / RBC: x / WBC x   Sq Epi: x / Non Sq Epi: x / Bacteria: x        PHYSICAL EXAM:  Constitutional: well developed, well nourished, NAD  Eyes: anicteric  ENMT: normal facies, symmetric  Respiratory: normal respiratory effort  Cardiovascular: regular rate  Gastrointestinal: abdomen soft, mildly tender in epigastric and RUQ region. Negative guarding or rebound, non-peritonitic.  Extremities: distal pulses intact  Neurological: no gross deficits  Skin: warm, well-perfused    < from: US Abdomen Upper Quadrant Right (25 @ 22:02) >  ACC: [id number] EXAM:  US ABDOMEN RT UPR QUADRANT   ORDERED BY: CONNIE HATCH     PROCEDURE DATE:  2025        INTERPRETATION:  CLINICAL INFORMATION: Abnormal LFTs    COMPARISON: Same day CT abdomen pelvis    TECHNIQUE: Sonography of theright upper quadrant.    FINDINGS:  Liver: Within normal limits.  Bile ducts: Normal caliber. Common bile duct measures 6 mm.  Gallbladder: Within normal limits.  Pancreas: Poorly visualized.  Right kidney: 11.1 cm. No hydronephrosis.  Ascites: None.  IVC: Visualized portions are within normal limits.    IMPRESSION:  Unremarkable right upper quadrant abdominal ultrasound.    < end of copied text >        A: 54y old male with pmhx HTN, pshx appendectomy with partial cecectomy() sent in by PCP due to abnormal lab value. CT with dilated CBD, gallbladder with moderate distention no definite stones or wall thickening. RUQ US benign, CBD 6mm. VSSAF, pt without complaints.  Abdominal exam benign. Total bilirubin uptrending 2.7 from 2.2. AST/ALT remains elevated (536/1060).    P:  - Follow up MRCP per GI  - Continue NPO  - IS, OOB  - rest of care per primary   - discussed and seen with Dr. Johnson

## 2025-07-03 NOTE — CARE COORDINATION ASSESSMENT. - OTHER PERTINENT DISCHARGE PLANNING INFORMATION:
Met with patient at bedside to discuss the role of case management with verbalized understanding.  Patient admitted with transaminitis.  Patient states he resides home with mother and brother, denies prior DME or [facility] services.  Will monitor for transition needs and remain available.  PCP Dr Dar Meraz

## 2025-07-03 NOTE — PROGRESS NOTE ADULT - PROBLEM SELECTOR PLAN 1
Pt sent to ED by PCP for transaminitis-elevated LFT's  - Afebrile, no leukocytosis. Denies abdominal pain at present  - Elevated Tbili 2.2, D bili 1.2, Alk phos 515, AST//1132, lipase 172  - CT A/P with IV contrast: gallbladder is moderately distended. No definite gallstones or gallbladder wall thickening. Increased caliber of the extrahepatic CBD measures up to 1.4 cm, with mild intrahepatic biliary prominence.   - RUQ US WNL  - MRCP 6 mm distal common bile duct calculus with associated biliary duct dilatation  - advanced diet from NPO, will restart NPO 24hr prior to procedure  - s/p 1000cc bolus x1 in ED  - c/w mIVF @ 75cc/hr while NPO  - Trend LFTs  - Hold home statin  - Avoid hepatotoxic meds  - GI (Dr. Solorio) consulted, recs appreciated  - Surgery (Dr. Johnson)  consulted, recs appreciated Pt sent to ED by PCP for transaminitis-elevated LFT's-CBD stone obstruction  - Afebrile, no leukocytosis. Denies abdominal pain at present  - Elevated Tbili 2.2, D bili 1.2, Alk phos 515, AST//1132, lipase 172  - CT A/P with IV contrast: gallbladder is moderately distended. No definite gallstones or gallbladder wall thickening. Increased caliber of the extrahepatic CBD measures up to 1.4 cm, with mild intrahepatic biliary prominence.   - RUQ US WNL  - MRCP 6 mm distal common bile duct calculus with associated biliary duct dilatation  - advanced diet from NPO, will restart NPO 24hr prior to procedure  - s/p 1000cc bolus x1 in ED  - c/w mIVF @ 75cc/hr while NPO  - AM  LFTs  - Hold home statin  - Avoid hepatotoxic meds  - GI (Dr. Turcios  d/w Ok for Po diet , Start IV Abx as AC Ramiro on MRCP  - Surgery (Dr. Johnson)  follow up with abnormal MRCP + AC ramiro d/w Sx PA

## 2025-07-03 NOTE — PROGRESS NOTE ADULT - SUBJECTIVE AND OBJECTIVE BOX
Patient is a 54y old  Male who presents with a chief complaint of Transaminitis (03 Jul 2025 13:23)    HPI:  Pt is a 53 yo M PMHx depression (possible Bipolar I?), HLD, HTN (not on medication), HLD, overactive bladder? presenting to ED for abnormal labs. Pt saw his PCP and got lab work, showing elevated LFTs so was instructed to go to ED. Pt has no acute complaints at present, denies abdominal pain, fever, chills, n/v/d/c.    ED Course:   Vitals: BP: 104/71, HR: 61, Temp: 98.1F, RR: 18, SpO2: 94% on RA   Labs:  WBC 5.32, Hg 14.5, K 3.5, Gluc 118, Tbili 2.2, D bili 1.2, Alk phos 515, AST//1132, lipase 172  CT: Gallbladder is moderately distended. No definite gallstones or gallbladder wall thickening. Increased caliber of the extrahepatic CBD measures up to 1.4 cm, with mild intrahepatic biliary prominence.   Received in the ED: 1000cc NS bolus x1 (02 Jul 2025 22:14)    INTERVAL HPI:  07/03: Pt seen and examined at bedside. Pt endorses an abundance if gas but denies any nausea, vomiting, constipation, diarrhea, abdominal pain, fever, or chills. Has no other complaints at this time. RUQ ultrasound unremarkable; MRCP shows evidence of 6mm distal common bile duct calculus with associated biliary duct dilatation- suggestive of acute cholecystitis. Diet advanced from NPO.                OVERNIGHT EVENTS:  N/A     Home Medications:  atorvastatin 10 mg oral tablet: 1 tab(s) orally once a day (at bedtime) (02 Jul 2025 22:27)  oxyBUTYnin 10 mg/24 hr oral tablet, extended release: 1 tab(s) orally once a day (02 Jul 2025 22:27)  sertraline 50 mg oral tablet: 1 tab(s) orally once a day (02 Jul 2025 22:27)      MEDICATIONS  (STANDING):  oxybutynin XL 10 milliGRAM(s) Oral daily  sodium chloride 0.9%. 1000 milliLiter(s) (75 mL/Hr) IV Continuous <Continuous>    MEDICATIONS  (PRN):  aluminum hydroxide/magnesium hydroxide/simethicone Suspension 30 milliLiter(s) Oral every 4 hours PRN Dyspepsia  melatonin 3 milliGRAM(s) Oral at bedtime PRN Insomnia  ondansetron Injectable 4 milliGRAM(s) IV Push every 8 hours PRN Nausea and/or Vomiting      Allergies    No Known Allergies    Intolerances        Social History:      REVIEW OF SYSTEMS:  CONSTITUTIONAL: No fever, No chills, No fatigue, No myalgia, No Body ache, No Weakness  EYES: No eye pain,  No visual disturbances, No discharge, NO Redness  ENMT:  No ear pain, No nose bleed, No vertigo; No sinus pain, NO throat pain, No Congestion  NECK: No pain, No stiffness  RESPIRATORY: No cough, NO wheezing, No  hemoptysis, NO  shortness of breath  CARDIOVASCULAR: No chest pain, palpitations  GASTROINTESTINAL: No abdominal pain, NO epigastric pain. No nausea, No vomiting; No diarrhea, No constipation. [ X ] BM. + gas  GENITOURINARY: No dysuria, No frequency, No urgency, No hematuria, NO incontinence  NEUROLOGICAL: No headaches, No dizziness, No numbness, No tingling, No tremors, No weakness  EXT: No Swelling, No Pain, No Edema  SKIN:  [ X ] No itching, burning, rashes, or lesions   MUSCULOSKELETAL: No joint pain ,No Jt swelling; No muscle pain, No back pain, No extremity pain  PSYCHIATRIC: No depression,  No anxiety,  No mood swings ,No difficulty sleeping at night  PAIN SCALE: [X  ] None  [  ] Other-  ROS Unable to obtain due to - [  ] Dementia  [  ] Lethargy [  ] Drowsy [  ] Sedated [  ] non verbal  REST OF REVIEW Of SYSTEM - [X  ] Normal     Vital Signs Last 24 Hrs  T(C): 36.9 (03 Jul 2025 13:39), Max: 36.9 (03 Jul 2025 05:00)  T(F): 98.4 (03 Jul 2025 13:39), Max: 98.4 (03 Jul 2025 05:00)  HR: 56 (03 Jul 2025 13:39) (53 - 61)  BP: 103/62 (03 Jul 2025 13:39) (103/62 - 138/83)  BP(mean): --  RR: 17 (03 Jul 2025 13:39) (17 - 20)  SpO2: 93% (03 Jul 2025 13:39) (93% - 98%)    Parameters below as of 03 Jul 2025 13:39  Patient On (Oxygen Delivery Method): room air      Finger Stick          PHYSICAL EXAM:  GENERAL:  [ X ] NAD , [  X] well appearing, [  ] Agitated, [  ] Drowsy,  [  ] Lethargy, [  ] confused   HEAD:  [ X ] Normal, [  ] Other  EYES:  [  X] EOMI, [ X ] PERRLA, [X  ] conjunctiva and sclera clear normal, [  ] Other,  [  ] Pallor,[  ] Discharge  ENMT:  [ X ] Normal, [  X] Moist mucous membranes, [X  ] Good dentition, [  ] No Thrush  NECK:  [ X ] Supple, [ X ] No JVD, [ X ] Normal thyroid, [  ] Lymphadenopathy [  ] Other  CHEST/LUNG:  [ X ] Clear to auscultation bilaterally, [ X ] Breath Sounds equal B/L / Decrease, [  ] poor effort  [  ] No rales, [  ] No rhonchi  [  ]  No wheezing,   HEART:  [X  ] Regular rate and rhythm, [  ] tachycardia, [  ] Bradycardia,  [  ] irregular  [X  ] No murmurs, No rubs, No gallops, [  ] PPM in place (Mfr:  )  ABDOMEN:  [ X ] Soft, [X  ] Nontender, [X  ] Nondistended, [X  ] No mass, [X  ] Bowel sounds present, [  ] obese  NERVOUS SYSTEM:  [X ] Alert & Oriented X3, [X  ] Nonfocal  [  ] Confusion  [  ] Encephalopathic [  ] Sedated [  ] Unable to assess, [  ] Dementia [  ] Other-  EXTREMITIES: [X  ] 2+ Peripheral Pulses, No clubbing, No cyanosis,  [  ] edema B/L lower EXT. [  ] PVD stasis skin changes B/L Lower EXT, [  ] wound  LYMPH: No lymphadenopathy noted  SKIN:  [ X ] No rashes or lesions, [  ] Pressure Ulcers, [  ] ecchymosis, [  ] Skin Tears, [  ] Other    DIET: Diet, DASH/TLC:   Sodium & Cholesterol Restricted (07-03-25 @ 15:13)      LABS:                        14.3   5.81  )-----------( 271      ( 03 Jul 2025 06:15 )             43.5     03 Jul 2025 06:15    140    |  110    |  8      ----------------------------<  87     3.4     |  23     |  0.61     Ca    8.4        03 Jul 2025 06:15    TPro  7.1    /  Alb  3.4    /  TBili  2.7    /  DBili  x      /  AST  596    /  ALT  1060   /  AlkPhos  536    03 Jul 2025 06:15    PT/INR - ( 03 Jul 2025 06:15 )   PT: 11.3 sec;   INR: 0.96 ratio         PTT - ( 03 Jul 2025 06:15 )  PTT:32.3 sec  Urinalysis Basic - ( 03 Jul 2025 06:15 )    Color: x / Appearance: x / SG: x / pH: x  Gluc: 87 mg/dL / Ketone: x  / Bili: x / Urobili: x   Blood: x / Protein: x / Nitrite: x   Leuk Esterase: x / RBC: x / WBC x   Sq Epi: x / Non Sq Epi: x / Bacteria: x                           Anemia Panel:      Thyroid Panel:        Lipase: 172 U/L (07-02-25 @ 17:50)          RADIOLOGY & ADDITIONAL TESTS:  < from: MR MRCP w/wo IV Cont (07.03.25 @ 13:29) >  ACC: [id number] EXAM:  MR MRCP WAW IC   ORDERED BY: ANUP AYALA     PROCEDURE DATE:  07/03/2025          INTERPRETATION:  CLINICAL INFORMATION: Transaminitis. Dilated common bile   duct on CT    COMPARISON: CT abdomen pelvis 7/2/2025; abdominal ultrasound 7/2/2025    CONTRAST/COMPLICATIONS:  IV Contrast: Gadavist  9 cc administered  Oral Contrast: NONE.    PROCEDURE:  MRI of the abdomen was performed.  MRCP was performed.    FINDINGS:  LOWER CHEST: Within normal limits.    LIVER: Within normal limits.  BILE DUCTS: 6 mm distal common bile duct stone (series 5, image 21) with   associated duct dilatation measuring up to 1.3 cm  GALLBLADDER: Multiple layering gallstones. Mildly distended gallbladder   with mildly edematous wall.  SPLEEN: Within normal limits.  PANCREAS: Within normal limits.  ADRENALS: Within normal limits.  KIDNEYS/URETERS: Subcentimeter bilateral renal cysts.    VISUALIZED PORTIONS:  BOWEL: Within normal limits.  PERITONEUM: No ascites.  VESSELS: Within normal limits.  RETROPERITONEUM/LYMPH NODES: No lymphadenopathy.  ABDOMINAL WALL: Within normal limits.  BONES: Within normal limits.    IMPRESSION:  6 mm distal common bile duct calculus with associated biliary duct   dilatation.    Distended gallbladder with stones and mural edema. Findings are   suggestive of acute cholecystitis in the appropriate clinical context.        --- End of Report ---    < end of copied text >  < from: US Abdomen Upper Quadrant Right (07.02.25 @ 22:02) >    ACC: [id number] EXAM:  US ABDOMEN RT UPR QUADRANT   ORDERED BY: CONNIE HATCH     PROCEDURE DATE:  07/02/2025          INTERPRETATION:  CLINICAL INFORMATION: Abnormal LFTs    COMPARISON: Same day CT abdomen pelvis    TECHNIQUE: Sonography of theright upper quadrant.    FINDINGS:  Liver: Within normal limits.  Bile ducts: Normal caliber. Common bile duct measures 6 mm.  Gallbladder: Within normal limits.  Pancreas: Poorly visualized.  Right kidney: 11.1 cm. No hydronephrosis.  Ascites: None.  IVC: Visualized portions are within normal limits.    IMPRESSION:  Unremarkable right upper quadrant abdominal ultrasound.        --- End of Report ---        < end of copied text >  < from: CT Abdomen and Pelvis w/ IV Cont (07.02.25 @ 20:08) >  ACC: [id number] EXAM:  CT ABDOMEN AND PELVIS IC   ORDERED BY: CONNIE HATCH     PROCEDURE DATE:  07/02/2025          INTERPRETATION:  CLINICAL INFORMATION: Elevated LFTs.    COMPARISON: CT abdomen/pelvis 7/22/2021.    CONTRAST/COMPLICATIONS:  IVContrast: Omnipaque 350  90 cc administered   10 cc discarded  Oral Contrast: NONE.    PROCEDURE:  CT of the Abdomen and Pelvis was performed.  Sagittal and coronal reformats were performed.    FINDINGS:  LOWER CHEST: Nonspecific groundglass opacities identified within the   visualized portions of the lower lung fields, representing interval   change from prior. No pleural effusion.    LIVER: No focal hepatic masses.  BILE DUCTS: Increased caliber of the extrahepatic CBD measures up to 1.4   cm, with mild intrahepatic biliary prominence.  GALLBLADDER: The gallbladder is moderately distended. No definite   gallstones or gallbladder wall thickening.  SPLEEN: Within normal limits.  PANCREAS: Within normal limits.  ADRENALS: Within normal limits.  KIDNEYS/URETERS: No hydronephrosis. No renal calculi. Subcentimeter   hypodense focus mid pole region left kidney, too small to characterize.    BLADDER: Minimally distended precluding assessment.  REPRODUCTIVE ORGANS: Prostate within normal limits.    BOWEL: Fecal material scattered throughout the colon. No evidence for   mechanical bowel obstruction. No colonic wall thickening. Linear   hypodensity identified within the right lower quadrant likely related to   prior appendectomy.  PERITONEUM/RETROPERITONEUM: Within normal limits.  VESSELS: Within normal limits.  LYMPH NODES: No lymphadenopathy.  ABDOMINAL WALL: Within normal limits.  BONES: Degenerative changes with multilevel intervertebral disc space   narrowing. Minimal anterolisthesis of L4 on L5.    IMPRESSION: The gallbladder is moderately distended. No definite   gallstones or gallbladder wall thickening. Increased caliber of the   extrahepatic CBD measures up to 1.4 cm, with mild intrahepatic biliary   prominence. Correlate with LFTs.  Consider further evaluation with   MR/MRCP.            --- End of Report ---    < end of copied text >      HEALTH ISSUES - PROBLEM Dx:  Transaminitis    Need for prophylactic measure    Anxiety and depression    HLD (hyperlipidemia)    HTN (hypertension)    OAB (overactive bladder)            Consultant(s) Notes Reviewed:  [ X ] YES     Care Discussed with [X] Consultants  [  ] Patient  [  ] Family [  ] HCP [  ]   [  ] Social Service  [X  ] RN, [  ] Physical Therapy,[  ] Palliative care team  DVT PPX: [  ] Lovenox, [  ] S C Heparin, [  ] Coumadin, [  ] Xarelto, [  ] Eliquis, [  ] Pradaxa, [  ] IV Heparin drip, [  ] SCD [  ] Contraindication 2 to GI Bleed,[  ] Ambulation [  ] Contraindicated 2 to  bleed [  ] Contraindicated 2 to Brain Bleed  Advanced directive: [  ] None, [  ] DNR/DNI Patient is a 54y old  Male who presents with a chief complaint of Transaminitis (03 Jul 2025 13:23)    HPI:  Pt is a 53 yo M PMHx depression (possible Bipolar I?), HLD, HTN (not on medication), HLD, overactive bladder? presenting to ED for abnormal labs. Pt saw his PCP and got lab work, showing elevated LFTs so was instructed to go to ED. Pt has no acute complaints at present, denies abdominal pain, fever, chills, n/v/d/c.    ED Course:   Vitals: BP: 104/71, HR: 61, Temp: 98.1F, RR: 18, SpO2: 94% on RA   Labs:  WBC 5.32, Hg 14.5, K 3.5, Gluc 118, Tbili 2.2, D bili 1.2, Alk phos 515, AST//1132, lipase 172  CT: Gallbladder is moderately distended. No definite gallstones or gallbladder wall thickening. Increased caliber of the extrahepatic CBD measures up to 1.4 cm, with mild intrahepatic biliary prominence.   Received in the ED: 1000cc NS bolus x1 (02 Jul 2025 22:14)    INTERVAL HPI:  07/03: Pt seen and examined at bedside. Pt endorses an abundance if gas but denies any nausea, vomiting, constipation, diarrhea, abdominal pain, fever, or chills. Has no other complaints at this time. RUQ ultrasound unremarkable; MRCP shows evidence of 6mm distal common bile duct calculus with associated biliary duct dilatation- suggestive of acute cholecystitis. Diet advanced from NPO.  MRCP today ,elevated LFT      OVERNIGHT EVENTS:  N/A     Home Medications:  atorvastatin 10 mg oral tablet: 1 tab(s) orally once a day (at bedtime) (02 Jul 2025 22:27)  oxyBUTYnin 10 mg/24 hr oral tablet, extended release: 1 tab(s) orally once a day (02 Jul 2025 22:27)  sertraline 50 mg oral tablet: 1 tab(s) orally once a day (02 Jul 2025 22:27)      MEDICATIONS  (STANDING):  oxybutynin XL 10 milliGRAM(s) Oral daily  sodium chloride 0.9%. 1000 milliLiter(s) (75 mL/Hr) IV Continuous <Continuous>    MEDICATIONS  (PRN):  aluminum hydroxide/magnesium hydroxide/simethicone Suspension 30 milliLiter(s) Oral every 4 hours PRN Dyspepsia  melatonin 3 milliGRAM(s) Oral at bedtime PRN Insomnia  ondansetron Injectable 4 milliGRAM(s) IV Push every 8 hours PRN Nausea and/or Vomiting      Allergies    No Known Allergies    Intolerances        Social History:      REVIEW OF SYSTEMS:  CONSTITUTIONAL: No fever, No chills, No fatigue, No myalgia, No Body ache, No Weakness  EYES: No eye pain,  No visual disturbances, No discharge, NO Redness  ENMT:  No ear pain, No nose bleed, No vertigo; No sinus pain, NO throat pain, No Congestion  NECK: No pain, No stiffness  RESPIRATORY: No cough, NO wheezing, No  hemoptysis, NO  shortness of breath  CARDIOVASCULAR: No chest pain, palpitations  GASTROINTESTINAL: No abdominal pain, NO epigastric pain. No nausea, No vomiting; No diarrhea, No constipation. [ X ] BM. + gas  GENITOURINARY: No dysuria, No frequency, No urgency, No hematuria, NO incontinence  NEUROLOGICAL: No headaches, No dizziness, No numbness, No tingling, No tremors, No weakness  EXT: No Swelling, No Pain, No Edema  SKIN:  [ X ] No itching, burning, rashes, or lesions   MUSCULOSKELETAL: No joint pain ,No Jt swelling; No muscle pain, No back pain, No extremity pain  PSYCHIATRIC: No depression,  No anxiety,  No mood swings ,No difficulty sleeping at night  PAIN SCALE: [X  ] None  [  ] Other-  ROS Unable to obtain due to - [  ] Dementia  [  ] Lethargy [  ] Drowsy [  ] Sedated [  ] non verbal  REST OF REVIEW Of SYSTEM - [X  ] Normal     Vital Signs Last 24 Hrs  T(C): 36.9 (03 Jul 2025 13:39), Max: 36.9 (03 Jul 2025 05:00)  T(F): 98.4 (03 Jul 2025 13:39), Max: 98.4 (03 Jul 2025 05:00)  HR: 56 (03 Jul 2025 13:39) (53 - 61)  BP: 103/62 (03 Jul 2025 13:39) (103/62 - 138/83)  BP(mean): --  RR: 17 (03 Jul 2025 13:39) (17 - 20)  SpO2: 93% (03 Jul 2025 13:39) (93% - 98%)    Parameters below as of 03 Jul 2025 13:39  Patient On (Oxygen Delivery Method): room air      Finger Stick          PHYSICAL EXAM:  GENERAL:  [ X ] NAD , [  X] well appearing, [  ] Agitated, [  ] Drowsy,  [  ] Lethargy, [  ] confused   HEAD:  [ X ] Normal, [  ] Other  EYES:  [  X] EOMI, [ X ] PERRLA, [X  ] conjunctiva and sclera clear normal, [  ] Other,  [  ] Pallor,[  ] Discharge  ENMT:  [ X ] Normal, [  X] Moist mucous membranes, [X  ] Good dentition, [ x ] No Thrush  NECK:  [ X ] Supple, [ X ] No JVD, [ X ] Normal thyroid, [  ] Lymphadenopathy [  ] Other  CHEST/LUNG:  [ X ] Clear to auscultation bilaterally, [ X ] Breath Sounds equal B/L / Decrease, [  ] poor effort  [  ] No rales, [  ] No rhonchi  [  ]  No wheezing,   HEART:  [X  ] Regular rate and rhythm, [  ] tachycardia, [  ] Bradycardia,  [  ] irregular  [X  ] No murmurs, No rubs, No gallops, [  ] PPM in place (Mfr:  )  ABDOMEN:  [ X ] Soft, [X  ] Nontender, [X  ] Nondistended, [X  ] No mass, [X  ] Bowel sounds present, [ x ] obese  NERVOUS SYSTEM:  [X ] Alert & Oriented X3, [X  ] Nonfocal  [  ] Confusion  [  ] Encephalopathic [  ] Sedated [  ] Unable to assess, [  ] Dementia [  ] Other-  EXTREMITIES: [X  ] 2+ Peripheral Pulses, No clubbing, No cyanosis,  [  ] edema B/L lower EXT. [  ] PVD stasis skin changes B/L Lower EXT, [  ] wound  LYMPH: No lymphadenopathy noted  SKIN:  [ X ] No rashes or lesions, [  ] Pressure Ulcers, [  ] ecchymosis, [  ] Skin Tears, [  ] Other    DIET: Diet, DASH/TLC:   Sodium & Cholesterol Restricted (07-03-25 @ 15:13)      LABS:                        14.3   5.81  )-----------( 271      ( 03 Jul 2025 06:15 )             43.5     03 Jul 2025 06:15    140    |  110    |  8      ----------------------------<  87     3.4     |  23     |  0.61     Ca    8.4        03 Jul 2025 06:15    TPro  7.1    /  Alb  3.4    /  TBili  2.7    /  DBili  x      /  AST  596    /  ALT  1060   /  AlkPhos  536    03 Jul 2025 06:15    PT/INR - ( 03 Jul 2025 06:15 )   PT: 11.3 sec;   INR: 0.96 ratio         PTT - ( 03 Jul 2025 06:15 )  PTT:32.3 sec  Urinalysis Basic - ( 03 Jul 2025 06:15 )    Color: x / Appearance: x / SG: x / pH: x  Gluc: 87 mg/dL / Ketone: x  / Bili: x / Urobili: x   Blood: x / Protein: x / Nitrite: x   Leuk Esterase: x / RBC: x / WBC x   Sq Epi: x / Non Sq Epi: x / Bacteria: x      Lipase: 172 U/L (07-02-25 @ 17:50)      RADIOLOGY & ADDITIONAL TESTS:  < from: MR MRCP w/wo IV Cont (07.03.25 @ 13:29) >  ACC: [id number] EXAM:  MR MRCP WAW IC   ORDERED BY: ANUP AYALA     PROCEDURE DATE:  07/03/2025          INTERPRETATION:  CLINICAL INFORMATION: Transaminitis. Dilated common bile   duct on CT    COMPARISON: CT abdomen pelvis 7/2/2025; abdominal ultrasound 7/2/2025    CONTRAST/COMPLICATIONS:  IV Contrast: Gadavist  9 cc administered  Oral Contrast: NONE.    PROCEDURE:  MRI of the abdomen was performed.  MRCP was performed.    FINDINGS:  LOWER CHEST: Within normal limits.    LIVER: Within normal limits.  BILE DUCTS: 6 mm distal common bile duct stone (series 5, image 21) with   associated duct dilatation measuring up to 1.3 cm  GALLBLADDER: Multiple layering gallstones. Mildly distended gallbladder   with mildly edematous wall.  SPLEEN: Within normal limits.  PANCREAS: Within normal limits.  ADRENALS: Within normal limits.  KIDNEYS/URETERS: Subcentimeter bilateral renal cysts.    VISUALIZED PORTIONS:  BOWEL: Within normal limits.  PERITONEUM: No ascites.  VESSELS: Within normal limits.  RETROPERITONEUM/LYMPH NODES: No lymphadenopathy.  ABDOMINAL WALL: Within normal limits.  BONES: Within normal limits.    IMPRESSION:  6 mm distal common bile duct calculus with associated biliary duct   dilatation.    Distended gallbladder with stones and mural edema. Findings are   suggestive of acute cholecystitis in the appropriate clinical context.        --- End of Report ---    < end of copied text >  < from: US Abdomen Upper Quadrant Right (07.02.25 @ 22:02) >    ACC: [id number] EXAM:  US ABDOMEN RT UPR QUADRANT   ORDERED BY: CONNIE HATCH     PROCEDURE DATE:  07/02/2025          INTERPRETATION:  CLINICAL INFORMATION: Abnormal LFTs    COMPARISON: Same day CT abdomen pelvis    TECHNIQUE: Sonography of theright upper quadrant.    FINDINGS:  Liver: Within normal limits.  Bile ducts: Normal caliber. Common bile duct measures 6 mm.  Gallbladder: Within normal limits.  Pancreas: Poorly visualized.  Right kidney: 11.1 cm. No hydronephrosis.  Ascites: None.  IVC: Visualized portions are within normal limits.    IMPRESSION:  Unremarkable right upper quadrant abdominal ultrasound.        --- End of Report ---        < end of copied text >  < from: CT Abdomen and Pelvis w/ IV Cont (07.02.25 @ 20:08) >  ACC: [id number] EXAM:  CT ABDOMEN AND PELVIS IC   ORDERED BY: CONNIE HATCH     PROCEDURE DATE:  07/02/2025          INTERPRETATION:  CLINICAL INFORMATION: Elevated LFTs.    COMPARISON: CT abdomen/pelvis 7/22/2021.    CONTRAST/COMPLICATIONS:  IVContrast: Omnipaque 350  90 cc administered   10 cc discarded  Oral Contrast: NONE.    PROCEDURE:  CT of the Abdomen and Pelvis was performed.  Sagittal and coronal reformats were performed.    FINDINGS:  LOWER CHEST: Nonspecific groundglass opacities identified within the   visualized portions of the lower lung fields, representing interval   change from prior. No pleural effusion.    LIVER: No focal hepatic masses.  BILE DUCTS: Increased caliber of the extrahepatic CBD measures up to 1.4   cm, with mild intrahepatic biliary prominence.  GALLBLADDER: The gallbladder is moderately distended. No definite   gallstones or gallbladder wall thickening.  SPLEEN: Within normal limits.  PANCREAS: Within normal limits.  ADRENALS: Within normal limits.  KIDNEYS/URETERS: No hydronephrosis. No renal calculi. Subcentimeter   hypodense focus mid pole region left kidney, too small to characterize.    BLADDER: Minimally distended precluding assessment.  REPRODUCTIVE ORGANS: Prostate within normal limits.    BOWEL: Fecal material scattered throughout the colon. No evidence for   mechanical bowel obstruction. No colonic wall thickening. Linear   hypodensity identified within the right lower quadrant likely related to   prior appendectomy.  PERITONEUM/RETROPERITONEUM: Within normal limits.  VESSELS: Within normal limits.  LYMPH NODES: No lymphadenopathy.  ABDOMINAL WALL: Within normal limits.  BONES: Degenerative changes with multilevel intervertebral disc space   narrowing. Minimal anterolisthesis of L4 on L5.    IMPRESSION: The gallbladder is moderately distended. No definite   gallstones or gallbladder wall thickening. Increased caliber of the   extrahepatic CBD measures up to 1.4 cm, with mild intrahepatic biliary   prominence. Correlate with LFTs.  Consider further evaluation with   MR/MRCP.            --- End of Report ---    < end of copied text >      HEALTH ISSUES - PROBLEM Dx:  Transaminitis    Need for prophylactic measure    Anxiety and depression    HLD (hyperlipidemia)    HTN (hypertension)    OAB (overactive bladder)        Consultant(s) Notes Reviewed:  [ X ] YES     Care Discussed with [X] Consultants  [ x ] Patient  [  ] Family [  ] HCP [  ]   [  ] Social Service  [X  ] RN, [  ] Physical Therapy,[  ] Palliative care team  DVT PPX: [ x ] Lovenox, [  ] S C Heparin, [  ] Coumadin, [  ] Xarelto, [  ] Eliquis, [  ] Pradaxa, [  ] IV Heparin drip, [  ] SCD [  ] Contraindication 2 to GI Bleed,[  ] Ambulation [  ] Contraindicated 2 to  bleed [  ] Contraindicated 2 to Brain Bleed  Advanced directive: [x  ] None, [  ] DNR/DNI

## 2025-07-03 NOTE — PROGRESS NOTE ADULT - PROBLEM SELECTOR PLAN 2
Chronic  - c/w home zoloft  - Possible Bipolar I disorder, pt hospitalized at [facility] for psychotic episode- discharged on Risperdal 3mg qhs, Depakote DR 500mg BID, Klonopin to 0.5mg qhs. Per pt no longer taking

## 2025-07-03 NOTE — DISCHARGE NOTE PROVIDER - NSDCFUADDINST_GEN_ALL_CORE_FT
Follow up with Surgery-DR Johnson in 2 weeks  Follow up with DR Solorio to remove Pancreatic Duct stent in 2-4 weeks   Follow up with PMD DR Meraz in 1 week to repeat Liver function test  Out pt Evaluation for JOMAR-Sleep apnea -sleep study, pulmonary follow up as per PMD

## 2025-07-03 NOTE — DISCHARGE NOTE PROVIDER - CARE PROVIDER_API CALL
Deangelo Solorio  Gastroenterology  [address] [place] [address]  Phone: [phone]  Fax: [phone]  Follow Up Time:    Deangelo Solorio  Gastroenterology  [address] [place] [address]  Phone: [phone]  Fax: [phone]  Follow Up Time:     Girma Johnson  Surgery (General Surgery)  [address], [address]  [place] [address]  Phone: [phone]  Fax: [phone]  Follow Up Time:    Deangelo Solorio  Gastroenterology  [address] [place] [address]  Phone: [phone]  Fax: [phone]  Follow Up Time:     Girma Johnson  Surgery (General Surgery)  [address] [facility] [address], [address]  [place] [address]  Phone: [phone]  Fax: [phone]  Follow Up Time:     Dar Meraz  [address] [address]  Phone: [phone]  Fax: [phone]  Follow Up Time:

## 2025-07-03 NOTE — DISCHARGE NOTE PROVIDER - NSDCMRMEDTOKEN_GEN_ALL_CORE_FT
atorvastatin 10 mg oral tablet: 1 tab(s) orally once a day (at bedtime)  oxyBUTYnin 10 mg/24 hr oral tablet, extended release: 1 tab(s) orally once a day  sertraline 50 mg oral tablet: 1 tab(s) orally once a day   cefuroxime 500 mg oral tablet: 1 tab(s) orally 2 times a day  oxyBUTYnin 10 mg/24 hr oral tablet, extended release: 1 tab(s) orally once a day

## 2025-07-03 NOTE — CARE COORDINATION ASSESSMENT. - NSCAREPROVIDERS_GEN_ALL_CORE_FT
CARE PROVIDERS:  Accepting Physician: Idris Rivas  Administration: Jodi Barcenas  Admitting: Idris Rivas  Attending: Idris Rivas  Case Management: David Ford  Case Management: Marita Rosen  Consultant: Girma Johnson  Consultant: Deana Churchill  Consultant: Henry Turcios  Consultant: Ivan Mcwilliams  Consultant: Teresa Aguilar  Consultant: Angel Mae  Covering Team: Trini Noguera  Covering Team: Tete Sifuentes  ED ACP: Wendy Nichols  ED Attending: Tay Espinoza  ED Nurse: Ryan Carranza  Emergency Medicine: Wendy Nichols  Nurse: Yara Ann  Nurse: Ramona Shah  Outpatient Provider: Cristiane Alvares  Override: Vanessa Yanez  Override: Yara Ann  Physical Therapy: Ava Yarbrough  Primary Team: Idris Rivas  Primary Team: Clifton Arnold  Primary Team: Denia Robins  Registered Dietitian: Marla Delgado  Respiratory Therapy: Tete Henley

## 2025-07-03 NOTE — DISCHARGE NOTE PROVIDER - HOSPITAL COURSE
HPI:  Pt is a 53 yo M PMHx depression (possible Bipolar I?), HLD, HTN (not on medication), HLD, overactive bladder? presenting to ED for abnormal labs. Pt saw his PCP and got lab work, showing elevated LFTs so was instructed to go to ED. Pt has no acute complaints at present, denies abdominal pain, fever, chills, n/v/d/c.    ED Course:   Vitals: BP: 104/71, HR: 61, Temp: 98.1F, RR: 18, SpO2: 94% on RA   Labs:  WBC 5.32, Hg 14.5, K 3.5, Gluc 118, Tbili 2.2, D bili 1.2, Alk phos 515, AST//1132, lipase 172  CT: Gallbladder is moderately distended. No definite gallstones or gallbladder wall thickening. Increased caliber of the extrahepatic CBD measures up to 1.4 cm, with mild intrahepatic biliary prominence.   Received in the ED: 1000cc NS bolus x1 (02 Jul 2025 22:14)      ---  HOSPITAL COURSE: Patient admitted to medicine floor for management of transaminitis. CT scan showed moderate CBD distention. RUQ US WNL. Surgery Dr. Johnson and GI Dr. Solorio consulted on patient, patient NPO and heparin held in case procedure is needed. MRCP scan shows  distal common bile duct stone with associated duct dilatation measuring up to 1.3 cm. Patient advanced to regular diet until further interventions needed.       Pt seen and examined on day of discharge. Patient is medically optimized for discharge to home with close outpatient followup.    PHYSICAL EXAM ON DAY OF DISCHARGE:  The patient was seen and examined on the day of discharge. Please see progress note from day of discharge for further information.         ---  CONSULTANTS:     ---  TIME SPENT:  I, the attending physician, was physically present for the key portions of the evaluation and management (E/M) service provided. The total amount of time spent reviewing the hospital notes, laboratory values, imaging findings, assessing/counseling the patient, discussing with consultant physicians, social work, nursing staff was -- minutes    ---  Primary care provider was made aware of plan for discharge:      [  ] NO     [  ] YES   HPI:  Pt is a 55 yo M PMHx depression (possible Bipolar I?), HLD, HTN (not on medication), HLD, overactive bladder? presenting to ED for abnormal labs. Pt saw his PCP and got lab work, showing elevated LFTs so was instructed to go to ED. Pt has no acute complaints at present, denies abdominal pain, fever, chills, n/v/d/c.    ED Course:   Vitals: BP: 104/71, HR: 61, Temp: 98.1F, RR: 18, SpO2: 94% on RA   Labs:  WBC 5.32, Hg 14.5, K 3.5, Gluc 118, Tbili 2.2, D bili 1.2, Alk phos 515, AST//1132, lipase 172  CT: Gallbladder is moderately distended. No definite gallstones or gallbladder wall thickening. Increased caliber of the extrahepatic CBD measures up to 1.4 cm, with mild intrahepatic biliary prominence.   Received in the ED: 1000cc NS bolus x1 (02 Jul 2025 22:14)      ---  HOSPITAL COURSE: Patient admitted to medicine floor for management of transaminitis. CT scan showed moderate CBD distention. RUQ US WNL. Surgery Dr. Johnson and GI Dr. Solorio consulted on patient, patient NPO and heparin held in case procedure is needed. MRCP scan shows  distal common bile duct stone with associated duct dilatation measuring up to 1.3 cm. Patient advanced to regular diet until further interventions needed. Pt's Lovenox held, was taken for laparoscopic cholecystectomy 7/7.      Pt seen and examined on day of discharge. Patient is medically optimized for discharge to home with close outpatient followup.    PHYSICAL EXAM ON DAY OF DISCHARGE:  The patient was seen and examined on the day of discharge. Please see progress note from day of discharge for further information.         ---  CONSULTANTS:     ---  TIME SPENT:  I, the attending physician, was physically present for the key portions of the evaluation and management (E/M) service provided. The total amount of time spent reviewing the hospital notes, laboratory values, imaging findings, assessing/counseling the patient, discussing with consultant physicians, social work, nursing staff was -- minutes    ---  Primary care provider was made aware of plan for discharge:      [  ] NO     [  ] YES   HPI:  Pt is a 55 yo M PMHx depression (possible Bipolar I?), HLD, HTN (not on medication), HLD, overactive bladder? presenting to ED for abnormal labs. Pt saw his PCP and got lab work, showing elevated LFTs so was instructed to go to ED. Pt has no acute complaints at present, denies abdominal pain, fever, chills, n/v/d/c.    ED Course:   Vitals: BP: 104/71, HR: 61, Temp: 98.1F, RR: 18, SpO2: 94% on RA   Labs:  WBC 5.32, Hg 14.5, K 3.5, Gluc 118, Tbili 2.2, D bili 1.2, Alk phos 515, AST//1132, lipase 172  CT: Gallbladder is moderately distended. No definite gallstones or gallbladder wall thickening. Increased caliber of the extrahepatic CBD measures up to 1.4 cm, with mild intrahepatic biliary prominence.   Received in the ED: 1000cc NS bolus x1 (02 Jul 2025 22:14)      ---  HOSPITAL COURSE: Patient admitted to medicine floor for management of transaminitis. Patient was started on maintenance fluid IV. CT scan showed moderate CBD distention. RUQ US WNL. Surgery Dr. Johnson and GI Dr. Solorio consulted on patient, patient NPO and DVT prophylaxis held in case procedure is needed. MRCP scan shows  distal common bile duct stone with associated duct dilatation measuring up to 1.3 cm. 40meq KCL given on 7/3 and 7/5 for mild hypokalemia. Started on IV Rocephin 7/3, last dose 7/10. Patient advanced to regular diet until further interventions needed. Pre op EKG showed no change from baseline and XRAY showed no evidence of acute cardiopulmonary disease. Pt's Lovenox held, was taken for laparoscopic cholecystectomy 7/7 which was successfully completed. WBC and neutrophils were elevated 7/8 likely reactive 2/2 to surgery. Surgical site clean without discharge or erythema, pt denied chills, nausea, vomiting, dysuria, SOB, CP, dizziness. ERCP performed on 7/8      Pt seen and examined on day of discharge. Patient is medically optimized for discharge to home with close outpatient followup.    PHYSICAL EXAM ON DAY OF DISCHARGE:  The patient was seen and examined on the day of discharge. Please see progress note from day of discharge for further information.         ---  CONSULTANTS:     ---  TIME SPENT:  I, the attending physician, was physically present for the key portions of the evaluation and management (E/M) service provided. The total amount of time spent reviewing the hospital notes, laboratory values, imaging findings, assessing/counseling the patient, discussing with consultant physicians, social work, nursing staff was -- minutes    ---  Primary care provider was made aware of plan for discharge:      [  ] NO     [  ] YES   HPI:  Pt is a 53 yo M PMHx depression (possible Bipolar I?), HLD, HTN (not on medication), HLD, overactive bladder? presenting to ED for abnormal labs. Pt saw his PCP and got lab work, showing elevated LFTs so was instructed to go to ED. Pt has no acute complaints at present, denies abdominal pain, fever, chills, n/v/d/c.    ED Course:   Vitals: BP: 104/71, HR: 61, Temp: 98.1F, RR: 18, SpO2: 94% on RA   Labs:  WBC 5.32, Hg 14.5, K 3.5, Gluc 118, Tbili 2.2, D bili 1.2, Alk phos 515, AST//1132, lipase 172  CT: Gallbladder is moderately distended. No definite gallstones or gallbladder wall thickening. Increased caliber of the extrahepatic CBD measures up to 1.4 cm, with mild intrahepatic biliary prominence.   Received in the ED: 1000cc NS bolus x1 (02 Jul 2025 22:14)      ---  HOSPITAL COURSE: Patient admitted to medicine floor for management of transaminitis. Patient was started on maintenance fluid IV. CT scan showed moderate CBD distention. RUQ US WNL. Surgery Dr. Johnson and GI Dr. Solorio consulted on patient, patient NPO and DVT prophylaxis held in case procedure is needed. MRCP scan shows  distal common bile duct stone with associated duct dilatation measuring up to 1.3 cm. 40meq KCL given on 7/3 and 7/5 for mild hypokalemia. Started on IV Rocephin 7/3, last dose 7/10. Patient advanced to regular diet until further interventions needed. Pre op EKG showed no change from baseline and XRAY showed no evidence of acute cardiopulmonary disease. Pt's Lovenox held, was taken for laparoscopic cholecystectomy 7/7 which was successfully completed. WBC and neutrophils were elevated 7/8 likely reactive 2/2 to surgery. Surgical site clean without discharge or erythema, pt denied chills, nausea, vomiting, dysuria, SOB, CP, dizziness. ERCP performed on 7/8 showed no obstucting stone, PD stent placed.   Continued improvement in LFTs and TBili.       Pt seen and examined on day of discharge. Patient is medically optimized for discharge to home with close outpatient followup.     PHYSICAL EXAM ON DAY OF DISCHARGE:  The patient was seen and examined on the day of discharge. Please see progress note from day of discharge for further information.         ---  CONSULTANTS:     ---  TIME SPENT:  I, the attending physician, was physically present for the key portions of the evaluation and management (E/M) service provided. The total amount of time spent reviewing the hospital notes, laboratory values, imaging findings, assessing/counseling the patient, discussing with consultant physicians, social work, nursing staff was -- minutes    ---  Primary care provider was made aware of plan for discharge:      [  ] NO     [  ] YES   HPI:  Pt is a 55 yo M PMHx depression (possible Bipolar I?), HLD, HTN (not on medication), HLD, overactive bladder? presenting to ED for abnormal labs. Pt saw his PCP and got lab work, showing elevated LFTs so was instructed to go to ED. Pt has no acute complaints at present, denies abdominal pain, fever, chills, n/v/d/c.    ED Course:   Vitals: BP: 104/71, HR: 61, Temp: 98.1F, RR: 18, SpO2: 94% on RA   Labs:  WBC 5.32, Hg 14.5, K 3.5, Gluc 118, Tbili 2.2, D bili 1.2, Alk phos 515, AST//1132, lipase 172  CT: Gallbladder is moderately distended. No definite gallstones or gallbladder wall thickening. Increased caliber of the extrahepatic CBD measures up to 1.4 cm, with mild intrahepatic biliary prominence.   Received in the ED: 1000cc NS bolus x1 (02 Jul 2025 22:14)      ---  HOSPITAL COURSE: Patient admitted to medicine floor for management of transaminitis. Patient was started on maintenance fluid IV. CT scan showed moderate CBD distention. RUQ US WNL. Surgery Dr. Johnson and GI Dr. Solorio consulted on patient, patient NPO and DVT prophylaxis held in case procedure is needed. MRCP scan shows  distal common bile duct stone with associated duct dilatation measuring up to 1.3 cm. 40meq KCL given on 7/3 and 7/5 for mild hypokalemia. Started on IV Rocephin 7/3, last dose 7/10. Patient advanced to regular diet until further interventions needed. Pre op EKG showed no change from baseline and XRAY showed no evidence of acute cardiopulmonary disease. Pt's Lovenox held, was taken for laparoscopic cholecystectomy 7/7 which was successfully completed. WBC and neutrophils were elevated 7/8 likely reactive 2/2 to surgery. Surgical site clean without discharge or erythema, pt denied chills, nausea, vomiting, dysuria, SOB, CP, dizziness. ERCP performed on 7/8 showed no obstucting stone, PD stent placed.   Continued improvement in LFTs and TBili. Pt needs to follow up in 2-4wks for stent removal. Pt had episode of desaturation to the 70s 7/9 overnight, was put on 6L NC and saturated at 95%. Had another desaturation episode 7/9 in the morning while lying down and on 3L O2 NC, had help sitting up and O2 went up to 96%. Pt was taken off O2 and is saturating at 94% on RA. Had no complaints of SOB and felt dizziness only upon sitting up. Orthostatics were negative.       Pt seen and examined on day of discharge. Patient is medically optimized for discharge to home with close outpatient followup.     PHYSICAL EXAM ON DAY OF DISCHARGE:  The patient was seen and examined on the day of discharge. Please see progress note from day of discharge for further information.         ---  CONSULTANTS:   Dr. Girma Solorio    ---  TIME SPENT:  I, the attending physician, was physically present for the key portions of the evaluation and management (E/M) service provided. The total amount of time spent reviewing the hospital notes, laboratory values, imaging findings, assessing/counseling the patient, discussing with consultant physicians, social work, nursing staff was -- minutes    ---  Primary care provider was made aware of plan for discharge:      [  ] NO     [  ] YES   HPI:  Pt is a 53 yo M PMHx depression (possible Bipolar I?), HLD, HTN (not on medication), HLD, overactive bladder? presenting to ED for abnormal labs. Pt saw his PCP and got lab work, showing elevated LFTs so was instructed to go to ED. Pt has no acute complaints at present, denies abdominal pain, fever, chills, n/v/d/c.    ED Course:   Vitals: BP: 104/71, HR: 61, Temp: 98.1F, RR: 18, SpO2: 94% on RA   Labs:  WBC 5.32, Hg 14.5, K 3.5, Gluc 118, Tbili 2.2, D bili 1.2, Alk phos 515, AST//1132, lipase 172  CT: Gallbladder is moderately distended. No definite gallstones or gallbladder wall thickening. Increased caliber of the extrahepatic CBD measures up to 1.4 cm, with mild intrahepatic biliary prominence.   Received in the ED: 1000cc NS bolus x1 (02 Jul 2025 22:14)      ---  HOSPITAL COURSE: Patient admitted to medicine floor for management of transaminitis. Patient was started on maintenance fluid IV. CT scan showed moderate CBD distention. RUQ US WNL. Surgery Dr. Johnson and GI Dr. Solorio consulted on patient, patient NPO and DVT prophylaxis held in case procedure is needed. MRCP scan shows  distal common bile duct stone with associated duct dilatation measuring up to 1.3 cm. 40meq KCL given on 7/3 and 7/5 for mild hypokalemia. Started on IV Rocephin 7/3, last dose 7/10. Patient advanced to regular diet until further interventions needed. Pre op EKG showed no change from baseline and XRAY showed no evidence of acute cardiopulmonary disease. Pt's Lovenox held, was taken for laparoscopic cholecystectomy 7/7 which was successfully completed. WBC and neutrophils were elevated 7/8 likely reactive 2/2 to surgery. Surgical site clean without discharge or erythema, pt denied chills, nausea, vomiting, dysuria, SOB, CP, dizziness. ERCP performed on 7/8 showed no obstucting stone, PD stent placed.   Continued improvement in LFTs and TBili. Pt recommended to follow up in 2-4wks with GI Dr. Solorio for stent removal. Pt had episode of desaturation to the 70s 7/9 overnight, was put on 6L NC and saturated at 95%. Had another desaturation episode 7/9 in the morning while lying down and on 3L O2 NC, had help sitting up and O2 went up to 96%. Pt was taken off O2 and is saturating at 94% on RA. Had no complaints of SOB and felt dizziness only upon sitting up. Orthostatics were negative. CXR showed IMPRESSION: Increasing left base infiltrate. Patient has continued to receive Rocephin during hospitalization. Planned to discharge home with Ceftin 500mg BID for 5 more days.     Pt seen and examined on day of discharge. Patient is medically optimized for discharge to home with close outpatient followup.     PHYSICAL EXAM ON DAY OF DISCHARGE:  The patient was seen and examined on the day of discharge. Please see progress note from day of discharge for further information.       ---  CONSULTANTS:   Surgery: Dr. Girma Johnson  GI: Dr. Deangelo Solorio    ---  TIME SPENT:  I, the attending physician, was physically present for the key portions of the evaluation and management (E/M) service provided. The total amount of time spent reviewing the hospital notes, laboratory values, imaging findings, assessing/counseling the patient, discussing with consultant physicians, social work, nursing staff was -- minutes    ---  Primary care provider was made aware of plan for discharge:      [  ] NO     [  ] YES   HPI:  Pt is a 53 yo M PMHx depression (possible Bipolar I?), HLD, HTN (not on medication), HLD, overactive bladder? presenting to ED for abnormal labs. Pt saw his PCP and got lab work, showing elevated LFTs so was instructed to go to ED. Pt has no acute complaints at present, denies abdominal pain, fever, chills, n/v/d/c.    ED Course:   Vitals: BP: 104/71, HR: 61, Temp: 98.1F, RR: 18, SpO2: 94% on RA   Labs:  WBC 5.32, Hg 14.5, K 3.5, Gluc 118, Tbili 2.2, D bili 1.2, Alk phos 515, AST//1132, lipase 172  CT: Gallbladder is moderately distended. No definite gallstones or gallbladder wall thickening. Increased caliber of the extrahepatic CBD measures up to 1.4 cm, with mild intrahepatic biliary prominence.   Received in the ED: 1000cc NS bolus x1 (02 Jul 2025 22:14)      ---  HOSPITAL COURSE: Patient admitted to medicine floor for management of transaminitis. Patient was started on maintenance fluid IV. CT scan showed moderate CBD distention. RUQ US WNL. Surgery Dr. Johnson and GI Dr. Solorio consulted on patient, patient NPO and DVT prophylaxis held in case procedure is needed. MRCP scan shows  distal common bile duct stone with associated duct dilatation measuring up to 1.3 cm. 40meq KCL given on 7/3 and 7/5 for mild hypokalemia. Started on IV Rocephin 7/3, last dose 7/10. Patient advanced to regular diet until further interventions needed. Pre op EKG showed no change from baseline and XRAY showed no evidence of acute cardiopulmonary disease. Pt's Lovenox held, was taken for laparoscopic cholecystectomy 7/7 which was successfully completed. WBC and neutrophils were elevated 7/8 likely reactive 2/2 to surgery. Surgical site clean without discharge or erythema, pt denied chills, nausea, vomiting, dysuria, SOB, CP, dizziness. ERCP performed on 7/8 showed no obstucting stone, PD stent placed.   Continued improvement in LFTs and TBili. Pt recommended to follow up in 2-4wks with GI Dr. Solorio for stent removal. Pt had episode of desaturation to the 70s 7/9 overnight, was put on 6L NC and saturated at 95%. Had another desaturation episode 7/9 in the morning while lying down and on 3L O2 NC, had help sitting up and O2 went up to 96%. Pt was taken off O2 and is saturating at 94% on RA. Had no complaints of SOB and felt dizziness only upon sitting up. Orthostatics were negative. CXR showed IMPRESSION: Increasing left base infiltrate. Patient has continued to receive Rocephin during hospitalization. Planned to discharge home with Ceftin 500mg BID for 5 more days.  Likely atelectasis post op lap ramiro, stable for d/c from GI & Sx stand point. pt is ambulating well, O2 sat on RA is stable ,pt needs further work up as out pt     Pt seen and examined on day of discharge. Patient is medically optimized for discharge to home with close outpatient followup.     PHYSICAL EXAM ON DAY OF DISCHARGE:  The patient was seen and examined on the day of discharge. Please see progress note from day of discharge for further information.       ---  CONSULTANTS:   Surgery: Dr. Girma Johnson  GI: Dr. Deangelo Solorio    ---  TIME SPENT:  I, the attending physician, was physically present for the key portions of the evaluation and management (E/M) service provided. The total amount of time spent reviewing the hospital notes, laboratory values, imaging findings, assessing/counseling the patient, discussing with consultant physicians, social work, nursing staff was -60 minutes

## 2025-07-03 NOTE — DISCHARGE NOTE PROVIDER - CARE PROVIDERS DIRECT ADDRESSES
,DirectAddress_Unknown ,DirectAddress_Unknown,[email or web address] ,DirectAddress_Unknown,[email or web address],DirectAddress_Unknown

## 2025-07-03 NOTE — CARE COORDINATION ASSESSMENT. - NSPASTMEDSURGHISTORY_GEN_ALL_CORE_FT
PAST MEDICAL & SURGICAL HISTORY:  Depression      History of Crohn's disease  (Stable, no meds)      Acute appendicitis with generalized peritonitis      S/P colonoscopy

## 2025-07-03 NOTE — DISCHARGE NOTE PROVIDER - PROVIDER TOKENS
PROVIDER:[TOKEN:[[id number]]] PROVIDER:[TOKEN:[[id number]]],PROVIDER:[TOKEN:[[id number]]] PROVIDER:[TOKEN:[[id number]]],PROVIDER:[TOKEN:[[id number]]],PROVIDER:[TOKEN:[[id number]]]

## 2025-07-03 NOTE — DISCHARGE NOTE PROVIDER - NSDCCPCAREPLAN_GEN_ALL_CORE_FT
PRINCIPAL DISCHARGE DIAGNOSIS  Diagnosis: Abnormal liver function test  Assessment and Plan of Treatment: You were admitted to the hospital for elevated liver enzymes. You were seen by our gastroenterologist and surgical team. Imaging was completed showing gallbladder inflammation and thickening, along with a stone in the gallbladder duct pathway. This caused your liver enzymes to be elevated. You had an MRI imaging showed a stone in the common bile duct with acute inflammation of the gallbladder. You were given antibiotics. You were recommended by both specialist teams to have the gallbladder removed and have a stent placed in the gallbladder pathway. You had your gallbladder removed, and a stent was placed successfully without complications.   Please FOLLOW UP with the gastroenterologist who placed the stent in 2-4 weeks for the stent to be removed: Dr. Solorio (GI)   Please FOLLOW UP with the surgeon who performed the gallbladder removal surgery in 2 weeks: Dr. Johnson   Please FOLLOW UP with your PCP within 1 week for repeat labs (complete blood count and comprehensive metabolic panel) to evaluate you white blood cell count and liver function.      SECONDARY DISCHARGE DIAGNOSES  Diagnosis: Acute cholecystitis  Assessment and Plan of Treatment: You had an acute inflammation of your gallbladder causing your liver enzymes to be elevated. You had your gallbladder removed by the surgical team.  Please FOLLOW UP with the surgeon who performed the gallbladder removal surgery in 2 weeks: Dr. Johnson    Diagnosis: Hypoxia  Assessment and Plan of Treatment: You had an episode of low oxygen levels during hospitalization. You had a chest XR done, which showed possible infection versus decreased lung volume from the surgical procedures.   Please continue the oral antibiotic CEFTIN 500mg twice daily for 5 more days. This will be sent to your pharmacy.   Please FOLLOW UP with your PCP to have a repeat chest XRAY done to assess this finding.    Diagnosis: HLD (hyperlipidemia)  Assessment and Plan of Treatment: You take a statin for your high cholesterol. Please CONTINUE to hold this medication until your follow up visit with PCP within 1 week.    Diagnosis: HTN (hypertension)  Assessment and Plan of Treatment: You do not take any medications for your blood pressure. Please FOLLOW UP with your PCP within 1 week for a blood pressure check.    Diagnosis: OAB (overactive bladder)  Assessment and Plan of Treatment: Please continue yoru home medication oxybutynin upon discharge.     PRINCIPAL DISCHARGE DIAGNOSIS  Diagnosis: Abnormal liver function test  Assessment and Plan of Treatment: You were admitted to the hospital for elevated liver enzymes. You were seen by our gastroenterologist and surgical team. Imaging was completed showing gallbladder inflammation and thickening, along with a stone in the gallbladder duct pathway. This caused your liver enzymes to be elevated. You had an MRI imaging showed a stone in the common bile duct with acute inflammation of the gallbladder. You were given antibiotics. You were recommended by both specialist teams to have the gallbladder removed and have a stent placed in the gallbladder pathway. You had your gallbladder removed, and a stent was placed successfully without complications.   Please FOLLOW UP with the gastroenterologist who placed the stent in 2-4 weeks for the stent to be removed: Dr. Solorio (GI)   Please FOLLOW UP with the surgeon who performed the gallbladder removal surgery in 2 weeks: Dr. Johnson   Please FOLLOW UP with your PCP within 1 week for repeat labs (complete blood count and comprehensive metabolic panel) to evaluate you white blood cell count and liver function.      SECONDARY DISCHARGE DIAGNOSES  Diagnosis: Acute cholecystitis  Assessment and Plan of Treatment: You had an acute inflammation of your gallbladder causing your liver enzymes to be elevated. You had your gallbladder removed by the surgical team.  Please FOLLOW UP with the surgeon who performed the gallbladder removal surgery in 2 weeks: Dr. Johnson    Diagnosis: Hypoxia  Assessment and Plan of Treatment: You had an episode of low oxygen levels during hospitalization. You had a chest XR done, which showed possible infection versus decreased lung volume from the surgical procedures.   Please START the oral antibiotic CEFTIN 500mg twice daily for 5 more days. Please start this TOMORROW 7/10/25.  This will be sent to your pharmacy.   Please FOLLOW UP with your PCP to have a repeat chest XRAY done to assess this finding.    Diagnosis: HLD (hyperlipidemia)  Assessment and Plan of Treatment: You take a statin for your high cholesterol. Please CONTINUE to hold this medication until your follow up visit with PCP within 1 week.    Diagnosis: HTN (hypertension)  Assessment and Plan of Treatment: You do not take any medications for your blood pressure. Please FOLLOW UP with your PCP within 1 week for a blood pressure check.    Diagnosis: OAB (overactive bladder)  Assessment and Plan of Treatment: Please continue yoru home medication oxybutynin upon discharge.     PRINCIPAL DISCHARGE DIAGNOSIS  Diagnosis: Abnormal liver function test  Assessment and Plan of Treatment: You were admitted to the hospital for elevated liver enzymes. You were seen by our gastroenterologist and surgical team. Imaging was completed showing gallbladder inflammation and thickening, along with a stone in the gallbladder duct pathway. This caused your liver enzymes to be elevated. You had an MRI imaging showed a stone in the common bile duct with acute inflammation of the gallbladder. You were given antibiotics. You were recommended by both specialist teams to have the gallbladder removed and have a stent placed in the gallbladder pathway. You had your gallbladder removed, and a stent was placed successfully without complications.   Please FOLLOW UP with the gastroenterologist who placed the stent in 2-4 weeks for the stent to be removed: Dr. Solorio (GI)   Please FOLLOW UP with the surgeon who performed the gallbladder removal surgery in 2 weeks: Dr. Johnson   Please FOLLOW UP with your PCP within 1 week for repeat labs (complete blood count and comprehensive metabolic panel) to evaluate you white blood cell count and liver function.      SECONDARY DISCHARGE DIAGNOSES  Diagnosis: Acute cholecystitis  Assessment and Plan of Treatment: You had an acute inflammation of your gallbladder causing your liver enzymes to be elevated. You had your gallbladder removed by the surgical team.  Please FOLLOW UP with the surgeon who performed the gallbladder removal surgery in 2 weeks: Dr. Johnson    Diagnosis: Hypoxia  Assessment and Plan of Treatment: You had an episode of low oxygen levels during hospitalization. You had a chest XR done, which showed possible infection versus decreased lung volume from the surgical procedures.   Please START the oral antibiotic CEFTIN 500mg twice daily for 5 more days. Please start this TOMORROW 7/10/25.  This will be sent to your pharmacy.   Please CONTINUE YOUR Incentive Spirometry upon discharge. 10 times every hour.   Please FOLLOW UP with your PCP to have a repeat chest XRAY done to assess this finding.    Diagnosis: HLD (hyperlipidemia)  Assessment and Plan of Treatment: You take a statin for your high cholesterol. Please CONTINUE to hold this medication until your follow up visit with PCP within 1 week.    Diagnosis: HTN (hypertension)  Assessment and Plan of Treatment: You do not take any medications for your blood pressure. Please FOLLOW UP with your PCP within 1 week for a blood pressure check.    Diagnosis: OAB (overactive bladder)  Assessment and Plan of Treatment: Please continue yoru home medication oxybutynin upon discharge.     PRINCIPAL DISCHARGE DIAGNOSIS  Diagnosis: Abnormal liver function test  Assessment and Plan of Treatment: You were admitted to the hospital for elevated liver enzymes. You were seen by our gastroenterologist and surgical team. Imaging was completed showing gallbladder inflammation and thickening, along with a stone in the gallbladder CBD- duct pathway. This caused your liver enzymes to be elevated. You had an MRI imaging showed a stone in the common bile duct with acute inflammation of the gallbladder. You were given antibiotics.   -You were recommended by both specialist teams to have the gallbladder removed By Dr Johnson-  -had your gallbladder removed, and a stent was placed successfully in Pancreatic duct without complications. By Dr Turcios   Please FOLLOW UP with the gastroenterologist who placed the stent in 2-4 weeks for the Pancreatic duct stent to be removed: Dr. Solorio (GI)   Please FOLLOW UP with the surgeon who performed the gallbladder removal surgery in 2 weeks: Dr. Johnson   Low fat diet  No Heavy weight lifting  Repeat LFT's in 1week with PMD  Please FOLLOW UP with your PCP within 1 week for repeat labs (complete blood count and comprehensive metabolic panel) to evaluate you white blood cell count and liver function.      SECONDARY DISCHARGE DIAGNOSES  Diagnosis: Acute cholecystitis  Assessment and Plan of Treatment: You had an acute inflammation of your gallbladder causing your liver enzymes to be elevated. You had your gallbladder removed by the surgical team.  Please FOLLOW UP with the surgeon who performed the gallbladder removal surgery in 2 weeks: Dr. Johnson    Diagnosis: HLD (hyperlipidemia)  Assessment and Plan of Treatment: You take a statin for your high cholesterol. Please CONTINUE to hold this medication until your follow up visit with PCP within 1 week. Repeat LFT;s in 1 week & if improved, restart medication as per PMD    Diagnosis: OAB (overactive bladder)  Assessment and Plan of Treatment: Please continue yoru home medication oxybutynin upon discharge.    Diagnosis: Hypoxia  Assessment and Plan of Treatment: You had an episode of low oxygen levels during hospitalization. You had a chest XR done, which showed possible infection versus decreased lung volume from the surgical procedures.   -Possible Post op Atelactasis -  Incentive spirometry   Repeat CBC in 1week with PMD  Please START the oral antibiotic CEFTIN 500mg twice daily for 5 more days. Please start this TOMORROW 7/10/25.  This will be sent to your pharmacy.   Please CONTINUE YOUR Incentive Spirometry upon discharge. 10 times every hour.   Please FOLLOW UP with your PCP to have a repeat chest XRAY done to assess this finding.    Diagnosis: HTN (hypertension)  Assessment and Plan of Treatment: You do not take any medications for your blood pressure. Please FOLLOW UP with your PCP within 1 week for a blood pressure check.

## 2025-07-03 NOTE — PROGRESS NOTE ADULT - ASSESSMENT
Pt is a 53 yo M PMHx depression , HLD, HTN (not on medication), HLD, overactive bladder?  presenting  to ED for abnormal labs. Admitted for transaminitis.

## 2025-07-03 NOTE — CONSULT NOTE ADULT - ASSESSMENT
elevated lfts    CT noted with dilated ducts however u/s shows non-dilated duct  await MRCP  ? passed stone  if mrcp negative Advance diet as tolerated  once lfts improving dc planning 
54y old male with pmhx HTN, pshx appendectomy with partial cecectomy(2021) sent in by PCP due to abnormal lab value. CT with dilated CBD, gallbladder with moderate distention no definite stones or wall thickening. Pt is well appearing, without complaints. Vitals stable, afebrile. Abdominal exam benign. Labs without leukocytosis, Alk phos 515, Ast/alt 742/1132, Tbili 2.2, Direct bili 1.2.   - medicine admit   - pending RUQ ultrasound for further evaluation and work up  - GI consulted by ED, reccs pending   - IS   - AM labs   - rest of care per primary   - discussed and seen with Dr. Johnson

## 2025-07-03 NOTE — CONSULT NOTE ADULT - SUBJECTIVE AND OBJECTIVE BOX
SURGERY PA CONSULT NOTE:    CHIEF COMPLAINT:  54y old male with pmhx HTN, pshx appendectomy with partial cecectomy(2021) sent in by PCP for abnormal lab value. Pt reports he was seen by his PCP and had bloodwork done on Tuesday of last week, was advised by PCP to come to ED for evaluation for finding of elevated liver enzymes. Pt without complaints and symptomatic at this time, is in usual state of health. Having regular BMs. Denies substance use. Denies fever, chills, abdominal pain, nausea, vomiting, changes in bowel habits, diarrhea, constipation, decreased PO intake, CP, SOB. Pt is primarily Arabic speaking,  ID [id number].     PAST MEDICAL HISTORY:  PAST MEDICAL & SURGICAL HISTORY:  Depression      Acute appendicitis with generalized peritonitis      History of Crohn's disease  (Stable, no meds)      S/P colonoscopy        REVIEW OF SYSTEMS:  General/Constitutional: No acute distress, no headache, weakness, fevers, or chills   HEENT: Denies auditory or visual changes/disturbances, no vertigo, no throat pain, no dysphagia    Neck: Denies neck pain/stiffness, denies swelling/lumps/hoarseness   Lymphatic: Denies lumps or swelling in the axillae, groin, or neck bilaterally   Respiratory: Denies cough/hemoptysis, denies wheezing/SOB/dyspnea  Cardiac: Denies chest pain, palpitations  Abdomen: Denies abdominal bloating/fullness, nausea or vomiting, denies abdominal pain  Extremities: Denies sores, swelling, discoloration bilat UE/LE  Genitourinary: Denies urinary issues or complaints, denies dysuria/hematuria  Neuro: Denies weakness, paraesthesias, paralysis, syncope, loss of vision  Skin: Denies pruritus, pain, rashes  Psych: Denies hallucinations, visual disturbances, or depression    MEDICATIONS:  Home Medications:  pantoprazole 40 mg oral delayed release tablet: 1 tab(s) orally once a day (before a meal) (11 Jul 2022 13:21)    MEDICATIONS  (STANDING):    MEDICATIONS  (PRN):      ALLERGIES:  Allergies    No Known Allergies    Intolerances        SOCIAL HISTORY:  Social History:    Smoking: Yes [ ]  No [x ]   ______pk yrs  ETOH  Yes [ ]  No [x ]  Social [ ]  DRUGS:  Yes [ ]  No [ x]  if so what______________    FAMILY HISTORY:  FAMILY HISTORY:  FH: Alzheimers disease (Father)        VITAL SIGNS:  Vital Signs Last 24 Hrs  T(C): 36.4 (02 Jul 2025 21:00), Max: 36.7 (02 Jul 2025 16:13)  T(F): 97.5 (02 Jul 2025 21:00), Max: 98.1 (02 Jul 2025 16:13)  HR: 53 (02 Jul 2025 21:00) (53 - 61)  BP: 131/74 (02 Jul 2025 21:00) (104/71 - 131/74)  BP(mean): --  RR: 18 (02 Jul 2025 21:00) (18 - 18)  SpO2: 98% (02 Jul 2025 21:00) (94% - 98%)    Parameters below as of 02 Jul 2025 21:00  Patient On (Oxygen Delivery Method): room air        PHYSICAL EXAM:  General: NAD, resting comfortably in bed  Pulmonary: Nonlabored, no respiratory distress  Cardiovascular: regular rate and rhythm   Abdomen: Soft, non tender, no palpable gallbladder. No guarding, rebound, and no peritoneal signs.  No evidence of hepatosplenomegaly.  No evidence of abdominal wall hernias.   Extremity: No swelling, or open sores, no gross deformities,  good range of motion, no edema  Neuro: Alert and oriented x3, motor and sensory intact  Psychiatric: Awake , alert, oriented x3 with an appropriate affect.     LABS:                        14.5   5.32  )-----------( 252      ( 02 Jul 2025 17:50 )             43.0     07-02    139  |  107  |  12  ----------------------------<  118[H]  3.5   |  28  |  0.96    Ca    8.8      02 Jul 2025 17:50    TPro  7.3  /  Alb  3.5  /  TBili  2.2[H]  /  DBili  1.2[H]  /  AST  742[H]  /  ALT  1132[H]  /  AlkPhos  515[H]  07-02    PT/INR - ( 02 Jul 2025 17:50 )   PT: 11.5 sec;   INR: 0.98 ratio         PTT - ( 02 Jul 2025 17:50 )  PTT:32.0 sec  Urinalysis Basic - ( 02 Jul 2025 17:50 )    Color: x / Appearance: x / SG: x / pH: x  Gluc: 118 mg/dL / Ketone: x  / Bili: x / Urobili: x   Blood: x / Protein: x / Nitrite: x   Leuk Esterase: x / RBC: x / WBC x   Sq Epi: x / Non Sq Epi: x / Bacteria: x      LIVER FUNCTIONS - ( 02 Jul 2025 17:50 )  Alb: 3.5 g/dL / Pro: 7.3 g/dL / ALK PHOS: 515 U/L / ALT: 1132 U/L / AST: 742 U/L / GGT: x             RADIOLOGY & ADDITIONAL STUDIES:  < from: CT Abdomen and Pelvis w/ IV Cont (07.02.25 @ 20:08) >  PROCEDURE DATE:  07/02/2025          INTERPRETATION:  CLINICAL INFORMATION: Elevated LFTs.    COMPARISON: CT abdomen/pelvis 7/22/2021.    CONTRAST/COMPLICATIONS:  IVContrast: Omnipaque 350  90 cc administered   10 cc discarded  Oral Contrast: NONE.    PROCEDURE:  CT of the Abdomen and Pelvis was performed.  Sagittal and coronal reformats were performed.    FINDINGS:  LOWER CHEST: Nonspecific groundglass opacities identified within the   visualized portions of the lower lung fields, representing interval   change from prior. No pleural effusion.    LIVER: No focal hepatic masses.  BILE DUCTS: Increased caliber of the extrahepatic CBD measures up to 1.4   cm, with mild intrahepatic biliary prominence.  GALLBLADDER: The gallbladder is moderately distended. No definite   gallstones or gallbladder wall thickening.  SPLEEN: Within normal limits.  PANCREAS: Within normal limits.  ADRENALS: Within normal limits.  KIDNEYS/URETERS: No hydronephrosis. No renal calculi. Subcentimeter   hypodense focus mid pole region left kidney, too small to characterize.    BLADDER: Minimally distended precluding assessment.  REPRODUCTIVE ORGANS: Prostate within normal limits.    BOWEL: Fecal material scattered throughout the colon. No evidence for   mechanical bowel obstruction. No colonic wall thickening. Linear   hypodensity identified within the right lower quadrant likely related to   prior appendectomy.  PERITONEUM/RETROPERITONEUM: Within normal limits.  VESSELS: Within normal limits.  LYMPH NODES: No lymphadenopathy.  ABDOMINAL WALL: Within normal limits.  BONES: Degenerative changes with multilevel intervertebral disc space   narrowing. Minimal anterolisthesis of L4 on L5.    IMPRESSION: The gallbladder is moderately distended. No definite   gallstones or gallbladder wall thickening. Increased caliber of the   extrahepatic CBD measures up to 1.4 cm, with mild intrahepatic biliary   prominence. Correlate with LFTs.  Consider further evaluation with   MR/MRCP.    < end of copied text >  
[facility]    Deangelo Osmin Snell NP    [address] [place] [id number]  [phone]      Chief Complaint:  Patient is a 54y old  Male who presents with a chief complaint of Transaminitis (2025 08:00)      HPI:Pt is a 53 yo M PMHx depression (possible Bipolar I?), HLD, HTN (not on medication), HLD, overactive bladder? presenting to ED for abnormal labs. Pt saw his PCP and got lab work, showing elevated LFTs so was instructed to go to ED. Pt has no acute complaints at present, denies abdominal pain, fever, chills, n/v/d/c.    Allergies:  No Known Allergies      Medications:  aluminum hydroxide/magnesium hydroxide/simethicone Suspension 30 milliLiter(s) Oral every 4 hours PRN  melatonin 3 milliGRAM(s) Oral at bedtime PRN  ondansetron Injectable 4 milliGRAM(s) IV Push every 8 hours PRN  oxybutynin XL 10 milliGRAM(s) Oral daily  potassium chloride    Tablet ER 40 milliEquivalent(s) Oral once  sodium chloride 0.9%. 1000 milliLiter(s) IV Continuous <Continuous>      PMHX/PSHX:  No pertinent past medical history    Depression    Acute appendicitis with generalized peritonitis    History of Crohn's disease    No significant past surgical history    S/P colonoscopy    S/P endoscopy    S/P colonoscopy    Alzheimers disease        Family history:  No pertinent family history in first degree relatives    FH: Alzheimers disease (Father)        Social History:     ROS:     General:  No wt loss, fevers, chills, night sweats, fatigue,   Eyes:  Good vision, no reported pain  ENT:  No sore throat, pain, runny nose, dysphagia  CV:  No pain, palpitations, hypo/hypertension  Resp:  No dyspnea, cough, tachypnea, wheezing  GI:  No pain, No nausea, No vomiting, No diarrhea, No constipation, No weight loss, No fever, No pruritis, No rectal bleeding, No tarry stools, No dysphagia,  :  No pain, bleeding, incontinence, nocturia  Muscle:  No pain, weakness  Neuro:  No weakness, tingling, memory problems  Psych:  No fatigue, insomnia, mood problems, depression  Endocrine:  No polyuria, polydipsia, cold/heat intolerance  Heme:  No petechiae, ecchymosis, easy bruisability  Skin:  No rash, tattoos, scars, edema      PHYSICAL EXAM:   Vital Signs:  Vital Signs Last 24 Hrs  T(C): 36.9 (2025 05:00), Max: 36.9 (2025 05:00)  T(F): 98.4 (2025 05:00), Max: 98.4 (2025 05:00)  HR: 60 (2025 05:00) (53 - 61)  BP: 108/69 (2025 05:00) (104/71 - 138/83)  BP(mean): --  RR: 19 (2025 05:00) (18 - 20)  SpO2: 93% (2025 05:00) (93% - 98%)    Parameters below as of 2025 05:00  Patient On (Oxygen Delivery Method): room air      Daily Height in cm: 160.02 (2025 16:13)    Daily Weight in k.6 (2025 05:00)    GENERAL:  Appears stated age, well-groomed, well-nourished, no distress  HEENT:  NC/AT,  conjunctivae clear and pink, no thyromegaly, nodules, adenopathy, no JVD, sclera -anicteric  CHEST:  Full & symmetric excursion, no increased effort, breath sounds clear  HEART:  Regular rhythm, S1, S2, no murmur/rub/S3/S4, no abdominal bruit, no edema  ABDOMEN:  Soft, non-tender, non-distended, normoactive bowel sounds,  no masses ,no hepato-splenomegaly, no signs of chronic liver disease  EXTEREMITIES:  no cyanosis,clubbing or edema  SKIN:  No rash/erythema/ecchymoses/petechiae/wounds/abscess/warm/dry  NEURO:  Alert, oriented, no asterixis, no tremor, no encephalopathy    LABS:                        14.3   5.81  )-----------( 271      ( 2025 06:15 )             43.5     07-03    140  |  110[H]  |  8   ----------------------------<  87  3.4[L]   |  23  |  0.61    Ca    8.4[L]      2025 06:15    TPro  7.1  /  Alb  3.4  /  TBili  2.7[H]  /  DBili  x   /  AST  596[H]  /  ALT  1060[H]  /  AlkPhos  536[H]  07-03    LIVER FUNCTIONS - ( 2025 06:15 )  Alb: 3.4 g/dL / Pro: 7.1 g/dL / ALK PHOS: 536 U/L / ALT: 1060 U/L / AST: 596 U/L / GGT: x           PT/INR - ( 2025 06:15 )   PT: 11.3 sec;   INR: 0.96 ratio         PTT - ( 2025 06:15 )  PTT:32.3 sec  Urinalysis Basic - ( 2025 06:15 )    Color: x / Appearance: x / SG: x / pH: x  Gluc: 87 mg/dL / Ketone: x  / Bili: x / Urobili: x   Blood: x / Protein: x / Nitrite: x   Leuk Esterase: x / RBC: x / WBC x   Sq Epi: x / Non Sq Epi: x / Bacteria: x      Amylase Serum--      Lipase [id number]       Ammonia--      Imaging:              
lock box on unit

## 2025-07-04 LAB
ALBUMIN SERPL ELPH-MCNC: 3.1 G/DL — LOW (ref 3.3–5)
ALP SERPL-CCNC: 454 U/L — HIGH (ref 40–120)
ALT FLD-CCNC: 756 U/L — HIGH (ref 12–78)
ANION GAP SERPL CALC-SCNC: 7 MMOL/L — SIGNIFICANT CHANGE UP (ref 5–17)
APTT BLD: 36.1 SEC — SIGNIFICANT CHANGE UP (ref 26.1–36.8)
AST SERPL-CCNC: 249 U/L — HIGH (ref 15–37)
BASOPHILS # BLD AUTO: 0.02 K/UL — SIGNIFICANT CHANGE UP (ref 0–0.2)
BASOPHILS NFR BLD AUTO: 0.4 % — SIGNIFICANT CHANGE UP (ref 0–2)
BILIRUB SERPL-MCNC: 1.5 MG/DL — HIGH (ref 0.2–1.2)
BUN SERPL-MCNC: 8 MG/DL — SIGNIFICANT CHANGE UP (ref 7–23)
CALCIUM SERPL-MCNC: 8.6 MG/DL — SIGNIFICANT CHANGE UP (ref 8.5–10.1)
CHLORIDE SERPL-SCNC: 108 MMOL/L — SIGNIFICANT CHANGE UP (ref 96–108)
CO2 SERPL-SCNC: 23 MMOL/L — SIGNIFICANT CHANGE UP (ref 22–31)
CREAT SERPL-MCNC: 0.67 MG/DL — SIGNIFICANT CHANGE UP (ref 0.5–1.3)
EGFR: 112 ML/MIN/1.73M2 — SIGNIFICANT CHANGE UP
EGFR: 112 ML/MIN/1.73M2 — SIGNIFICANT CHANGE UP
EOSINOPHIL # BLD AUTO: 0.53 K/UL — HIGH (ref 0–0.5)
EOSINOPHIL NFR BLD AUTO: 9.5 % — HIGH (ref 0–6)
GLUCOSE SERPL-MCNC: 87 MG/DL — SIGNIFICANT CHANGE UP (ref 70–99)
HCT VFR BLD CALC: 44 % — SIGNIFICANT CHANGE UP (ref 39–50)
HGB BLD-MCNC: 14.3 G/DL — SIGNIFICANT CHANGE UP (ref 13–17)
IMM GRANULOCYTES # BLD AUTO: 0.01 K/UL — SIGNIFICANT CHANGE UP (ref 0–0.07)
IMM GRANULOCYTES NFR BLD AUTO: 0.2 % — SIGNIFICANT CHANGE UP (ref 0–0.9)
INR BLD: 0.97 RATIO — SIGNIFICANT CHANGE UP (ref 0.85–1.16)
LYMPHOCYTES # BLD AUTO: 1.31 K/UL — SIGNIFICANT CHANGE UP (ref 1–3.3)
LYMPHOCYTES NFR BLD AUTO: 23.5 % — SIGNIFICANT CHANGE UP (ref 13–44)
MCHC RBC-ENTMCNC: 29.7 PG — SIGNIFICANT CHANGE UP (ref 27–34)
MCHC RBC-ENTMCNC: 32.5 G/DL — SIGNIFICANT CHANGE UP (ref 32–36)
MCV RBC AUTO: 91.5 FL — SIGNIFICANT CHANGE UP (ref 80–100)
MONOCYTES # BLD AUTO: 0.45 K/UL — SIGNIFICANT CHANGE UP (ref 0–0.9)
MONOCYTES NFR BLD AUTO: 8.1 % — SIGNIFICANT CHANGE UP (ref 2–14)
NEUTROPHILS # BLD AUTO: 3.26 K/UL — SIGNIFICANT CHANGE UP (ref 1.8–7.4)
NEUTROPHILS NFR BLD AUTO: 58.3 % — SIGNIFICANT CHANGE UP (ref 43–77)
NRBC # BLD AUTO: 0 K/UL — SIGNIFICANT CHANGE UP (ref 0–0)
NRBC # FLD: 0 K/UL — SIGNIFICANT CHANGE UP (ref 0–0)
NRBC BLD AUTO-RTO: 0 /100 WBCS — SIGNIFICANT CHANGE UP (ref 0–0)
PLATELET # BLD AUTO: 287 K/UL — SIGNIFICANT CHANGE UP (ref 150–400)
PMV BLD: 9.3 FL — SIGNIFICANT CHANGE UP (ref 7–13)
POTASSIUM SERPL-MCNC: 3.7 MMOL/L — SIGNIFICANT CHANGE UP (ref 3.5–5.3)
POTASSIUM SERPL-SCNC: 3.7 MMOL/L — SIGNIFICANT CHANGE UP (ref 3.5–5.3)
PROT SERPL-MCNC: 7 G/DL — SIGNIFICANT CHANGE UP (ref 6–8.3)
PROTHROM AB SERPL-ACNC: 11.4 SEC — SIGNIFICANT CHANGE UP (ref 9.9–13.4)
RBC # BLD: 4.81 M/UL — SIGNIFICANT CHANGE UP (ref 4.2–5.8)
RBC # FLD: 15.1 % — HIGH (ref 10.3–14.5)
SODIUM SERPL-SCNC: 138 MMOL/L — SIGNIFICANT CHANGE UP (ref 135–145)
WBC # BLD: 5.58 K/UL — SIGNIFICANT CHANGE UP (ref 3.8–10.5)
WBC # FLD AUTO: 5.58 K/UL — SIGNIFICANT CHANGE UP (ref 3.8–10.5)

## 2025-07-04 RX ORDER — ENOXAPARIN SODIUM 100 MG/ML
40 INJECTION SUBCUTANEOUS EVERY 24 HOURS
Refills: 0 | Status: DISCONTINUED | OUTPATIENT
Start: 2025-07-04 | End: 2025-07-06

## 2025-07-04 RX ADMIN — CEFTRIAXONE 100 MILLIGRAM(S): 500 INJECTION, POWDER, FOR SOLUTION INTRAMUSCULAR; INTRAVENOUS at 17:11

## 2025-07-04 RX ADMIN — OXYBUTYNIN CHLORIDE 10 MILLIGRAM(S): 5 TABLET, FILM COATED, EXTENDED RELEASE ORAL at 11:37

## 2025-07-04 RX ADMIN — ENOXAPARIN SODIUM 40 MILLIGRAM(S): 100 INJECTION SUBCUTANEOUS at 11:36

## 2025-07-04 NOTE — PROGRESS NOTE ADULT - ASSESSMENT
elevated lfts    7/3 MRCP: 6 mm distal common bile duct calculus with associated biliary duct dilatation.    Plan:  - CT noted with dilated ducts however u/s shows non-dilated duct  - MRCP noted, positive for CBD stone  - Will try to schedule ERCP as add on for Monday if there is OR availability  - Monitor WBC and LFTs, bilirubin  - Diet ok for now

## 2025-07-04 NOTE — PROGRESS NOTE ADULT - SUBJECTIVE AND OBJECTIVE BOX
S: Patient seen and examined at bedside.  No overnight events.  Patient reports no new complaints at this time.  Admits to flatus and BM.  Voiding, ambulating and tolerating diet. Patient denies any fever, chills, chest pain, shortness of breath, nausea, vomiting, or urinary complaints.    MEDICATIONS:  MEDICATIONS  (STANDING):  cefTRIAXone   IVPB 1000 milliGRAM(s) IV Intermittent every 24 hours  oxybutynin XL 10 milliGRAM(s) Oral daily  sodium chloride 0.9%. 1000 milliLiter(s) (75 mL/Hr) IV Continuous <Continuous>    MEDICATIONS  (PRN):  aluminum hydroxide/magnesium hydroxide/simethicone Suspension 30 milliLiter(s) Oral every 4 hours PRN Dyspepsia  melatonin 3 milliGRAM(s) Oral at bedtime PRN Insomnia  ondansetron Injectable 4 milliGRAM(s) IV Push every 8 hours PRN Nausea and/or Vomiting      O:  VITAL SIGNS:  Vital Signs Last 24 Hrs  T(C): 36.4 (04 Jul 2025 05:00), Max: 36.9 (03 Jul 2025 13:39)  T(F): 97.6 (04 Jul 2025 05:00), Max: 98.4 (03 Jul 2025 13:39)  HR: 55 (04 Jul 2025 05:00) (52 - 56)  BP: 107/69 (04 Jul 2025 05:00) (97/62 - 107/69)  BP(mean): --  RR: 18 (04 Jul 2025 05:00) (17 - 18)  SpO2: 93% (04 Jul 2025 05:00) (92% - 93%)    Parameters below as of 04 Jul 2025 05:00  Patient On (Oxygen Delivery Method): room air        PHYSICAL EXAM:  GENERAL: No acute distress, lying comfortably in bed  HEAD:  Atraumatic, Normocephalic  CHEST/LUNG: Non labored respirations, no accessory muscle use. CTAB; No wheezes, rales, or rhonchi  HEART: Regular rate and rhythm; No murmurs, rubs, or gallops  ABDOMEN: Soft, non-tender, non-distended; bowel sounds+  EXT: calves non-tender b/l, no edema  NEUROLOGY: A&O x 3, no focal deficits    INTAKE & OUTPUT:  I&O's Summary    03 Jul 2025 07:01  -  04 Jul 2025 07:00  --------------------------------------------------------  IN: 1650 mL / OUT: 0 mL / NET: 1650 mL      I&O's Detail    03 Jul 2025 07:01  -  04 Jul 2025 07:00  --------------------------------------------------------  IN:    sodium chloride 0.9%: 675 mL    sodium chloride 0.9%: 975 mL  Total IN: 1650 mL    OUT:  Total OUT: 0 mL    Total NET: 1650 mL          LABS:                        14.3   5.58  )-----------( 287      ( 04 Jul 2025 06:24 )             44.0     07-04    138  |  108  |  8   ----------------------------<  87  3.7   |  23  |  0.67    Ca    8.6      04 Jul 2025 06:24    TPro  7.0  /  Alb  3.1[L]  /  TBili  1.5[H]  /  DBili  x   /  AST  249[H]  /  ALT  756[H]  /  AlkPhos  454[H]  07-04    PT/INR - ( 04 Jul 2025 06:24 )   PT: 11.4 sec;   INR: 0.97 ratio         PTT - ( 04 Jul 2025 06:24 )  PTT:36.1 sec        RADIOLOGY & ADDITIONAL STUDIES:    A: 53y/o Male POD# s/p    P:  - VSSAF  - On exam:   - Labs   - On CT:  - Continue diet  - Continue antibiotics  - Continue to monitor Is & Os  - DVT prophylaxis with  - Encourage OOBA, IS  - Pain control, supportive care    Case to be discussed with   S: Patient seen and examined at bedside.  No overnight events. Patient reports no new complaints this AM, denies abdominal pain. Patient denies any fever, chills, chest pain, shortness of breath, nausea, vomiting, or urinary complaints.    MEDICATIONS:  MEDICATIONS  (STANDING):  cefTRIAXone   IVPB 1000 milliGRAM(s) IV Intermittent every 24 hours  oxybutynin XL 10 milliGRAM(s) Oral daily  sodium chloride 0.9%. 1000 milliLiter(s) (75 mL/Hr) IV Continuous <Continuous>    MEDICATIONS  (PRN):  aluminum hydroxide/magnesium hydroxide/simethicone Suspension 30 milliLiter(s) Oral every 4 hours PRN Dyspepsia  melatonin 3 milliGRAM(s) Oral at bedtime PRN Insomnia  ondansetron Injectable 4 milliGRAM(s) IV Push every 8 hours PRN Nausea and/or Vomiting      O:  VITAL SIGNS:  Vital Signs Last 24 Hrs  T(C): 36.4 (04 Jul 2025 05:00), Max: 36.9 (03 Jul 2025 13:39)  T(F): 97.6 (04 Jul 2025 05:00), Max: 98.4 (03 Jul 2025 13:39)  HR: 55 (04 Jul 2025 05:00) (52 - 56)  BP: 107/69 (04 Jul 2025 05:00) (97/62 - 107/69)  RR: 18 (04 Jul 2025 05:00) (17 - 18)  SpO2: 93% (04 Jul 2025 05:00) (92% - 93%)    Parameters below as of 04 Jul 2025 05:00  Patient On (Oxygen Delivery Method): room air    PHYSICAL EXAM:  GENERAL: No acute distress, lying comfortably in bed  HEAD:  Atraumatic, Normocephalic  CHEST/LUNG: Non labored respirations, no accessory muscle use  HEART: Regular rate and rhythm  ABDOMEN: Soft, non-tender, non-distended; negative joseph's sign, no rebound tenderness/guarding.  EXT: calves non-tender b/l, no edema  NEUROLOGY: A&O x 3, no focal deficits    INTAKE & OUTPUT:  I&O's Summary    03 Jul 2025 07:01  -  04 Jul 2025 07:00  --------------------------------------------------------  IN: 1650 mL / OUT: 0 mL / NET: 1650 mL      I&O's Detail    03 Jul 2025 07:01  -  04 Jul 2025 07:00  --------------------------------------------------------  IN:    sodium chloride 0.9%: 675 mL    sodium chloride 0.9%: 975 mL  Total IN: 1650 mL    OUT:  Total OUT: 0 mL    Total NET: 1650 mL          LABS:                        14.3   5.58  )-----------( 287      ( 04 Jul 2025 06:24 )             44.0     07-04    138  |  108  |  8   ----------------------------<  87  3.7   |  23  |  0.67    Ca    8.6      04 Jul 2025 06:24    TPro  7.0  /  Alb  3.1[L]  /  TBili  1.5[H]  /  DBili  x   /  AST  249[H]  /  ALT  756[H]  /  AlkPhos  454[H]  07-04    PT/INR - ( 04 Jul 2025 06:24 )   PT: 11.4 sec;   INR: 0.97 ratio         PTT - ( 04 Jul 2025 06:24 )  PTT:36.1 sec        RADIOLOGY & ADDITIONAL STUDIES:  < from: MR MRCP w/wo IV Cont (07.03.25 @ 13:29) >    IMPRESSION:  6 mm distal common bile duct calculus with associated biliary duct   dilatation.    Distended gallbladder with stones and mural edema. Findings are   suggestive of acute cholecystitis in the appropriate clinical context.        --- End of Report ---    < end of copied text >    A: 55y/o Male PMHx HTN, PSHx appendectomy with partial cecectomy (Dr. Rodgers, 2021) sent in by PCP for abnormal labs, found to ahve dilated CBD, and gallbladder with moderate distention. VSSAF, exam unremarkable. Labs with no leukocytosis, downtrending LFTs and Tbili. MRCP performed yseterday with 6mm CBD stone with biliary duct dilatation, distended gallbladdder with stones and mural edema.     P:  - VSSAF, exam unremarkable. Labs with LFTs downtrending, Tbili downtrending  - MRCP with CBD stone, for possible ERCP on 7/7 with GI  - Tentative plan for cholecystectomy this week  - Reg diet  - Rest of care per primary    Case discussed with Dr. Johnson.

## 2025-07-04 NOTE — PROGRESS NOTE ADULT - PROBLEM SELECTOR PLAN 1
transaminitis-elevated LFT's -CBD stone obstruction  - Afebrile, no leukocytosis.  - CT A/P with IV contrast: gallbladder is moderately distended. No definite gallstones or gallbladder wall thickening. Increased caliber of the extrahepatic CBD measures up to 1.4 cm, with mild intrahepatic biliary prominence.   - RUQ US WNL  - MRCP 6 mm distal common bile duct calculus with associated biliary duct dilatation AC ramiro   - advanced diet   - AM  LFTs  - Hold home statin  - Avoid hepatotoxic meds  - GI (Dr. Solorio   d/w Ok for Po diet , ERCP on Monday  Start IV Abx as AC Ramiro   - Surgery (Dr. Johnson)  follow up with abnormal MRCP + AC ramiro d/w Sx PA no plan

## 2025-07-04 NOTE — PROGRESS NOTE ADULT - PROBLEM SELECTOR PLAN 2
Chronic  - c/w home zoloft  - Possible Bipolar I disorder, pt hospitalized at [facility] for psychotic episode- discharged on Risperdal 3mg qhs, Depakote DR 500mg BID, Klonopin to 0.5mg qhs. Per pt no longer taking

## 2025-07-04 NOTE — PROGRESS NOTE ADULT - SUBJECTIVE AND OBJECTIVE BOX
Patient is a 54y old  Male who presents with a chief complaint of Transaminitis (04 Jul 2025 10:52)    HPI:  Pt is a 53 yo M PMHx depression (possible Bipolar I?), HLD, HTN (not on medication), HLD, overactive bladder? presenting to ED for abnormal labs. Pt saw his PCP and got lab work, showing elevated LFTs so was instructed to go to ED. Pt has no acute complaints at present, denies abdominal pain, fever, chills, n/v/d/c.    ED Course:   Vitals: BP: 104/71, HR: 61, Temp: 98.1F, RR: 18, SpO2: 94% on RA   Labs:  WBC 5.32, Hg 14.5, K 3.5, Gluc 118, Tbili 2.2, D bili 1.2, Alk phos 515, AST//1132, lipase 172  CT: Gallbladder is moderately distended. No definite gallstones or gallbladder wall thickening. Increased caliber of the extrahepatic CBD measures up to 1.4 cm, with mild intrahepatic biliary prominence.   Received in the ED: 1000cc NS bolus x1 (02 Jul 2025 22:14)      INTERVAL HPI:  07/03: Pt seen and examined at bedside. Pt endorses an abundance if gas but denies any nausea, vomiting, constipation, diarrhea, abdominal pain, fever, or chills. Has no other complaints at this time. RUQ ultrasound unremarkable; MRCP shows evidence of 6mm distal common bile duct calculus with associated biliary duct dilatation- suggestive of acute cholecystitis. Diet advanced from NPO.  MRCP today ,elevated LFT  7/4: Pt seen ,examined  MRCP + for CBD stone, AC ramiro, on IV Rocephin, LFT's improving ,On PO diet    OVERNIGHT EVENTS: NONE    Home Medications:  atorvastatin 10 mg oral tablet: 1 tab(s) orally once a day (at bedtime) (02 Jul 2025 22:27)  oxyBUTYnin 10 mg/24 hr oral tablet, extended release: 1 tab(s) orally once a day (02 Jul 2025 22:27)  sertraline 50 mg oral tablet: 1 tab(s) orally once a day (02 Jul 2025 22:27)      MEDICATIONS  (STANDING):  cefTRIAXone   IVPB 1000 milliGRAM(s) IV Intermittent every 24 hours  enoxaparin Injectable 40 milliGRAM(s) SubCutaneous every 24 hours  oxybutynin XL 10 milliGRAM(s) Oral daily  sodium chloride 0.9%. 1000 milliLiter(s) (75 mL/Hr) IV Continuous <Continuous>    MEDICATIONS  (PRN):  aluminum hydroxide/magnesium hydroxide/simethicone Suspension 30 milliLiter(s) Oral every 4 hours PRN Dyspepsia  melatonin 3 milliGRAM(s) Oral at bedtime PRN Insomnia  ondansetron Injectable 4 milliGRAM(s) IV Push every 8 hours PRN Nausea and/or Vomiting      Allergies    No Known Allergies    Intolerances        Social History:      REVIEW OF SYSTEMS: i  feel good, want to go home  CONSTITUTIONAL: No fever, No chills, No fatigue, No myalgia, No Body ache, No Weakness  EYES: No eye pain,  No visual disturbances, No discharge, NO Redness  ENMT:  No ear pain, No nose bleed, No vertigo; No sinus pain, NO throat pain, No Congestion  NECK: No pain, No stiffness  RESPIRATORY: No cough, NO wheezing, No  hemoptysis, NO  shortness of breath  CARDIOVASCULAR: No chest pain, palpitations  GASTROINTESTINAL: No abdominal pain, NO epigastric pain. No nausea, No vomiting; No diarrhea, No constipation. [ x ] BM  GENITOURINARY: No dysuria, No frequency, No urgency, No hematuria, NO incontinence  NEUROLOGICAL: No headaches, No dizziness, No numbness, No tingling, No tremors, No weakness  EXT: No Swelling, No Pain, No Edema  SKIN:  [ x ] No itching, burning, rashes, or lesions   MUSCULOSKELETAL: No joint pain ,No Jt swelling; No muscle pain, No back pain, No extremity pain  PSYCHIATRIC: No depression,  No anxiety,  No mood swings ,No difficulty sleeping at night  PAIN SCALE: [x  ] None  [  ] Other-  ROS Unable to obtain due to - [  ] Dementia  [  ] Lethargy [  ] Drowsy [  ] Sedated [  ] non verbal  REST OF REVIEW Of SYSTEM - [x  ] Normal     Vital Signs Last 24 Hrs  T(C): 36.8 (04 Jul 2025 12:17), Max: 36.8 (03 Jul 2025 21:06)  T(F): 98.2 (04 Jul 2025 12:17), Max: 98.3 (03 Jul 2025 21:06)  HR: 61 (04 Jul 2025 12:17) (52 - 61)  BP: 112/66 (04 Jul 2025 12:17) (97/62 - 112/66)  BP(mean): --  RR: 18 (04 Jul 2025 12:17) (18 - 18)  SpO2: 93% (04 Jul 2025 12:17) (92% - 93%)    Parameters below as of 04 Jul 2025 12:17  Patient On (Oxygen Delivery Method): room air      Finger Stick        07-03 @ 07:01  -  07-04 @ 07:00  --------------------------------------------------------  IN: 1650 mL / OUT: 0 mL / NET: 1650 mL        PHYSICAL EXAM:  GENERAL:  [ X ] NAD , [  X] well appearing, [  ] Agitated, [  ] Drowsy,  [  ] Lethargy, [  ] confused   HEAD:  [ X ] Normal, [  ] Other  EYES:  [  X] EOMI, [ X ] PERRLA, [X  ] conjunctiva and sclera clear normal, [  ] Other,  [  ] Pallor,[  ] Discharge  ENMT:  [ X ] Normal, [  X] Moist mucous membranes, [X  ] Good dentition, [ x ] No Thrush  NECK:  [ X ] Supple, [ X ] No JVD, [ X ] Normal thyroid, [  ] Lymphadenopathy [  ] Other  CHEST/LUNG:  [ X ] Clear to auscultation bilaterally, [ X ] Breath Sounds equal B/L / Decrease, [  ] poor effort  [ x ] No rales, [ x ] No rhonchi  [ x ]  No wheezing,   HEART:  [X  ] Regular rate and rhythm, [  ] tachycardia, [  ] Bradycardia,  [  ] irregular  [X  ] No murmurs, No rubs, No gallops, [  ] PPM in place (Mfr:  )  ABDOMEN:  [ X ] Soft, [X  ] Nontender, [X  ] Nondistended, [X  ] No mass, [X  ] Bowel sounds present, [ x ] obese  NERVOUS SYSTEM:  [X ] Alert & Oriented X3, [X  ] Nonfocal  [  ] Confusion  [  ] Encephalopathic [  ] Sedated [  ] Unable to assess, [  ] Dementia [  ] Other-  EXTREMITIES: [X  ] 2+ Peripheral Pulses, No clubbing, No cyanosis,  [  ] edema B/L lower EXT. [  ] PVD stasis skin changes B/L Lower EXT, [  ] wound  LYMPH: No lymphadenopathy noted  SKIN:  [ X ] No rashes or lesions, [  ] Pressure Ulcers, [  ] ecchymosis, [  ] Skin Tears, [  ] Other      DIET: Diet, DASH/TLC:   Sodium & Cholesterol Restricted (07-04-25 @ 09:39)      LABS:                        14.3   5.58  )-----------( 287      ( 04 Jul 2025 06:24 )             44.0     04 Jul 2025 06:24    138    |  108    |  8      ----------------------------<  87     3.7     |  23     |  0.67     Ca    8.6        04 Jul 2025 06:24    TPro  7.0    /  Alb  3.1    /  TBili  1.5    /  DBili  x      /  AST  249    /  ALT  756    /  AlkPhos  454    04 Jul 2025 06:24    PT/INR - ( 04 Jul 2025 06:24 )   PT: 11.4 sec;   INR: 0.97 ratio         PTT - ( 04 Jul 2025 06:24 )  PTT:36.1 sec  Urinalysis Basic - ( 04 Jul 2025 06:24 )    Color: x / Appearance: x / SG: x / pH: x  Gluc: 87 mg/dL / Ketone: x  / Bili: x / Urobili: x   Blood: x / Protein: x / Nitrite: x   Leuk Esterase: x / RBC: x / WBC x   Sq Epi: x / Non Sq Epi: x / Bacteria: x      Lipase: 172 U/L (07-02-25 @ 17:50)      RADIOLOGY & ADDITIONAL TESTS:  < from: MR MRCP w/wo IV Cont (07.03.25 @ 13:29) >    LIVER: Within normal limits.  BILE DUCTS: 6 mm distal common bile duct stone (series 5, image 21) with   associated duct dilatation measuring up to 1.3 cm  GALLBLADDER: Multiple layering gallstones. Mildly distended gallbladder   with mildly edematous wall.  SPLEEN: Within normal limits.  PANCREAS: Within normal limits.  ADRENALS: Within normal limits.  KIDNEYS/URETERS: Subcentimeter bilateral renal cysts.    VISUALIZED PORTIONS:  BOWEL: Within normal limits.  PERITONEUM: No ascites.  VESSELS: Within normal limits.  RETROPERITONEUM/LYMPH NODES: No lymphadenopathy.  ABDOMINAL WALL: Within normal limits.  BONES: Within normal limits.    IMPRESSION:  6 mm distal common bile duct calculus with associated biliary duct   dilatation.    Distended gallbladder with stones and mural edema. Findings are   suggestive of acute cholecystitis in the appropriate clinical context.      < end of copied text >      HEALTH ISSUES - PROBLEM Dx:  Transaminitis    Need for prophylactic measure    Anxiety and depression    HLD (hyperlipidemia)    HTN (hypertension)    OAB (overactive bladder)        Consultant(s) Notes Reviewed:  [x  ] YES     Care Discussed with [X] Consultants  [x  ] Patient  [  ] Family [  ] HCP [  ]   [  ] Social Service  [x  ] RN, [  ] Physical Therapy,[  ] Palliative care team  DVT PPX: [ x ] Lovenox, [  ] S C Heparin, [  ] Coumadin, [  ] Xarelto, [  ] Eliquis, [  ] Pradaxa, [  ] IV Heparin drip, [  ] SCD [  ] Contraindication 2 to GI Bleed,[  ] Ambulation [  ] Contraindicated 2 to  bleed [  ] Contraindicated 2 to Brain Bleed  Advanced directive: [x  ] None, [  ] DNR/DNI

## 2025-07-04 NOTE — PROGRESS NOTE ADULT - SUBJECTIVE AND OBJECTIVE BOX
[facility]  Yasmany Snell NP  [address] [place] [address]  [phone]      INTERVAL HPI/OVERNIGHT EVENTS:  Pt s/e  Pt feels well, he denies abdominal pain today  MRCP noted    MEDICATIONS  (STANDING):  cefTRIAXone   IVPB 1000 milliGRAM(s) IV Intermittent every 24 hours  enoxaparin Injectable 40 milliGRAM(s) SubCutaneous every 24 hours  oxybutynin XL 10 milliGRAM(s) Oral daily  sodium chloride 0.9%. 1000 milliLiter(s) (75 mL/Hr) IV Continuous <Continuous>    MEDICATIONS  (PRN):  aluminum hydroxide/magnesium hydroxide/simethicone Suspension 30 milliLiter(s) Oral every 4 hours PRN Dyspepsia  melatonin 3 milliGRAM(s) Oral at bedtime PRN Insomnia  ondansetron Injectable 4 milliGRAM(s) IV Push every 8 hours PRN Nausea and/or Vomiting      Allergies  No Known Allergies      PHYSICAL EXAM:   Vital Signs:  Vital Signs Last 24 Hrs  T(C): 36.4 (2025 05:00), Max: 36.9 (2025 13:39)  T(F): 97.6 (2025 05:00), Max: 98.4 (2025 13:39)  HR: 55 (2025 05:00) (52 - 56)  BP: 107/69 (2025 05:00) (97/62 - 107/69)  BP(mean): --  RR: 18 (2025 05:00) (17 - 18)  SpO2: 93% (2025 05:00) (92% - 93%)    Parameters below as of 2025 05:00  Patient On (Oxygen Delivery Method): room air      Daily     Daily Weight in k.5 (2025 05:00)    GENERAL:  Appears stated age  HEENT:  NC/AT  CHEST:  Full & symmetric excursion  HEART:  Regular rhythm  ABDOMEN:  Soft, non-tender, non-distended  EXTEREMITIES:  no cyanosis  SKIN:  No rash  NEURO:  Alert      LABS:                        14.3   5.58  )-----------( 287      ( 2025 06:24 )             44.0     07-    138  |  108  |  8   ----------------------------<  87  3.7   |  23  |  0.67    Ca    8.6      2025 06:24    TPro  7.0  /  Alb  3.1[L]  /  TBili  1.5[H]  /  DBili  x   /  AST  249[H]  /  ALT  756[H]  /  AlkPhos  454[H]  07-04    PT/INR - ( 2025 06:24 )   PT: 11.4 sec;   INR: 0.97 ratio         PTT - ( 2025 06:24 )  PTT:36.1 sec  Urinalysis Basic - ( 2025 06:24 )    Color: x / Appearance: x / SG: x / pH: x  Gluc: 87 mg/dL / Ketone: x  / Bili: x / Urobili: x   Blood: x / Protein: x / Nitrite: x   Leuk Esterase: x / RBC: x / WBC x   Sq Epi: x / Non Sq Epi: x / Bacteria: x    RADIOLOGY  < from: MR MRCP w/wo IV Cont (25 @ 13:29) >    ACC: [id number] EXAM:  MR MRCP WAW IC   ORDERED BY: ANUP AYALA     PROCEDURE DATE:  2025          INTERPRETATION:  CLINICAL INFORMATION: Transaminitis. Dilated common bile   duct on CT    COMPARISON: CT abdomen pelvis 2025; abdominal ultrasound 2025    CONTRAST/COMPLICATIONS:  IV Contrast: Gadavist  9 cc administered  Oral Contrast: NONE.    PROCEDURE:  MRI of the abdomen was performed.  MRCP was performed.    FINDINGS:  LOWER CHEST: Within normal limits.    LIVER: Within normal limits.  BILE DUCTS: 6 mm distal common bile duct stone (series 5, image 21) with   associated duct dilatation measuring up to 1.3 cm  GALLBLADDER: Multiple layering gallstones. Mildly distended gallbladder   with mildly edematous wall.  SPLEEN: Within normal limits.  PANCREAS: Within normal limits.  ADRENALS: Within normal limits.  KIDNEYS/URETERS: Subcentimeter bilateral renal cysts.    VISUALIZED PORTIONS:  BOWEL: Within normal limits.  PERITONEUM: No ascites.  VESSELS: Within normal limits.  RETROPERITONEUM/LYMPH NODES: No lymphadenopathy.  ABDOMINAL WALL: Within normal limits.  BONES: Within normal limits.    IMPRESSION:  6 mm distal common bile duct calculus with associated biliary duct   dilatation.    Distended gallbladder with stones and mural edema. Findings are   suggestive of acute cholecystitis in the appropriate clinical context.    --- End of Report ---    BLANCA PIERRE MD; Fellow Radiology  This document has been electronically signed.  MICHAEL OLIVER MD; AttendingRadiologist  This document has been electronically signed. Jul  3 2025  4:08PM    < end of copied text >

## 2025-07-05 LAB
ALBUMIN SERPL ELPH-MCNC: 3.3 G/DL — SIGNIFICANT CHANGE UP (ref 3.3–5)
ALP SERPL-CCNC: 579 U/L — HIGH (ref 40–120)
ALT FLD-CCNC: 1042 U/L — HIGH (ref 12–78)
ANION GAP SERPL CALC-SCNC: 7 MMOL/L — SIGNIFICANT CHANGE UP (ref 5–17)
AST SERPL-CCNC: 878 U/L — HIGH (ref 15–37)
BILIRUB SERPL-MCNC: 2 MG/DL — HIGH (ref 0.2–1.2)
BUN SERPL-MCNC: 12 MG/DL — SIGNIFICANT CHANGE UP (ref 7–23)
CALCIUM SERPL-MCNC: 8.9 MG/DL — SIGNIFICANT CHANGE UP (ref 8.5–10.1)
CHLORIDE SERPL-SCNC: 104 MMOL/L — SIGNIFICANT CHANGE UP (ref 96–108)
CO2 SERPL-SCNC: 26 MMOL/L — SIGNIFICANT CHANGE UP (ref 22–31)
CREAT SERPL-MCNC: 0.88 MG/DL — SIGNIFICANT CHANGE UP (ref 0.5–1.3)
EGFR: 103 ML/MIN/1.73M2 — SIGNIFICANT CHANGE UP
EGFR: 103 ML/MIN/1.73M2 — SIGNIFICANT CHANGE UP
GLUCOSE SERPL-MCNC: 129 MG/DL — HIGH (ref 70–99)
HCT VFR BLD CALC: 43.3 % — SIGNIFICANT CHANGE UP (ref 39–50)
HGB BLD-MCNC: 14.6 G/DL — SIGNIFICANT CHANGE UP (ref 13–17)
MCHC RBC-ENTMCNC: 30.5 PG — SIGNIFICANT CHANGE UP (ref 27–34)
MCHC RBC-ENTMCNC: 33.7 G/DL — SIGNIFICANT CHANGE UP (ref 32–36)
MCV RBC AUTO: 90.6 FL — SIGNIFICANT CHANGE UP (ref 80–100)
NRBC # BLD AUTO: 0 K/UL — SIGNIFICANT CHANGE UP (ref 0–0)
NRBC # FLD: 0 K/UL — SIGNIFICANT CHANGE UP (ref 0–0)
NRBC BLD AUTO-RTO: 0 /100 WBCS — SIGNIFICANT CHANGE UP (ref 0–0)
PLATELET # BLD AUTO: 280 K/UL — SIGNIFICANT CHANGE UP (ref 150–400)
PMV BLD: 9.4 FL — SIGNIFICANT CHANGE UP (ref 7–13)
POTASSIUM SERPL-MCNC: 3.5 MMOL/L — SIGNIFICANT CHANGE UP (ref 3.5–5.3)
POTASSIUM SERPL-SCNC: 3.5 MMOL/L — SIGNIFICANT CHANGE UP (ref 3.5–5.3)
PROT SERPL-MCNC: 7.2 G/DL — SIGNIFICANT CHANGE UP (ref 6–8.3)
RBC # BLD: 4.78 M/UL — SIGNIFICANT CHANGE UP (ref 4.2–5.8)
RBC # FLD: 14.9 % — HIGH (ref 10.3–14.5)
SODIUM SERPL-SCNC: 137 MMOL/L — SIGNIFICANT CHANGE UP (ref 135–145)
WBC # BLD: 6.71 K/UL — SIGNIFICANT CHANGE UP (ref 3.8–10.5)
WBC # FLD AUTO: 6.71 K/UL — SIGNIFICANT CHANGE UP (ref 3.8–10.5)

## 2025-07-05 RX ORDER — KETOROLAC TROMETHAMINE 30 MG/ML
15 INJECTION, SOLUTION INTRAMUSCULAR; INTRAVENOUS ONCE
Refills: 0 | Status: DISCONTINUED | OUTPATIENT
Start: 2025-07-05 | End: 2025-07-05

## 2025-07-05 RX ADMIN — ENOXAPARIN SODIUM 40 MILLIGRAM(S): 100 INJECTION SUBCUTANEOUS at 12:43

## 2025-07-05 RX ADMIN — OXYBUTYNIN CHLORIDE 10 MILLIGRAM(S): 5 TABLET, FILM COATED, EXTENDED RELEASE ORAL at 12:43

## 2025-07-05 RX ADMIN — KETOROLAC TROMETHAMINE 15 MILLIGRAM(S): 30 INJECTION, SOLUTION INTRAMUSCULAR; INTRAVENOUS at 00:49

## 2025-07-05 RX ADMIN — CEFTRIAXONE 100 MILLIGRAM(S): 500 INJECTION, POWDER, FOR SOLUTION INTRAMUSCULAR; INTRAVENOUS at 18:34

## 2025-07-05 RX ADMIN — Medication 3 MILLIGRAM(S): at 21:34

## 2025-07-05 RX ADMIN — KETOROLAC TROMETHAMINE 15 MILLIGRAM(S): 30 INJECTION, SOLUTION INTRAMUSCULAR; INTRAVENOUS at 01:49

## 2025-07-05 RX ADMIN — Medication 40 MILLIEQUIVALENT(S): at 16:13

## 2025-07-05 RX ADMIN — Medication 75 MILLILITER(S): at 16:14

## 2025-07-05 NOTE — PROGRESS NOTE ADULT - PROBLEM SELECTOR PLAN 2
Chronic  - c/w home zoloft  - Possible Bipolar I disorder, pt hospitalized at [facility] for psychotic episode- discharged on Risperdal 3mg qhs, Depakote DR 500mg BID, Klonopin to 0.5mg qhs. Per pt no longer taking

## 2025-07-05 NOTE — CHART NOTE - NSCHARTNOTEFT_GEN_A_CORE
Called by RN for pt experiencing trouble sleeping due to abdominal pain and diarrhea.        T(C): 37.3 (07-04-25 @ 21:15), Max: 37.3 (07-04-25 @ 21:15)  HR: 56 (07-04-25 @ 21:15) (55 - 61)  BP: 111/73 (07-04-25 @ 21:15) (107/69 - 112/66)  RR: 18 (07-04-25 @ 21:15) (18 - 18)  SpO2: 96% (07-04-25 @ 21:15) (93% - 96%)  Wt(kg): --    Physical :  Gen- NAD, ncat  Cardio - s+1,s+2, rrr, no murmur  Lung - cta b/l, no wheeze, no rhonchi, no rales   Abdomen- +BS, NT/ND, no guarding, no rebound, no masses  Ext- no edema, 2+ pulses b/l  Neuro- CN grossly intact, strength 5/5 b/l extrem    LABS:                        14.3   5.58  )-----------( 287      ( 04 Jul 2025 06:24 )             44.0     07-04    138  |  108  |  8   ----------------------------<  87  3.7   |  23  |  0.67    Ca    8.6      04 Jul 2025 06:24    TPro  7.0  /  Alb  3.1[L]  /  TBili  1.5[H]  /  DBili  x   /  AST  249[H]  /  ALT  756[H]  /  AlkPhos  454[H]  07-04    PT/INR - ( 04 Jul 2025 06:24 )   PT: 11.4 sec;   INR: 0.97 ratio         PTT - ( 04 Jul 2025 06:24 )  PTT:36.1 sec  Urinalysis Basic - ( 04 Jul 2025 06:24 )    Color: x / Appearance: x / SG: x / pH: x  Gluc: 87 mg/dL / Ketone: x  / Bili: x / Urobili: x   Blood: x / Protein: x / Nitrite: x   Leuk Esterase: x / RBC: x / WBC x   Sq Epi: x / Non Sq Epi: x / Bacteria: x              Assessment/Plan  Pt is a 55 yo M PMHx depression , HLD, HTN (not on medication), HLD, overactive bladder?  presenting  to ED for abnormal labs. Admitted for transaminitis.    - Toradol IV ordered to help subside pain and discomfort sleeping.  - GI PCR ordered to evaluate diarrhea. Called by RN for pt experiencing trouble sleeping due to abdominal pain and diarrhea. On physical exam, mild tenderness to palpation in RUQ with no rebound guarding. Negative Martinez sign. Pt rates pain as mild and reports that it is non-radiating. Pt reports diarrhea that is improving but still causing some discomfort as it is accompanied with gassiness.         T(C): 37.3 (07-04-25 @ 21:15), Max: 37.3 (07-04-25 @ 21:15)  HR: 56 (07-04-25 @ 21:15) (55 - 61)  BP: 111/73 (07-04-25 @ 21:15) (107/69 - 112/66)  RR: 18 (07-04-25 @ 21:15) (18 - 18)  SpO2: 96% (07-04-25 @ 21:15) (93% - 96%)  Wt(kg): --    Physical :  Gen- NAD, ncat  Cardio - s+1,s+2, rrr, no murmur  Lung - cta b/l, no wheeze, no rhonchi, no rales   Abdomen- mild tenderness to palpation in RUQ  Ext- no edema, 2+ pulses b/l  Neuro- CN grossly intact, strength 5/5 b/l extrem    LABS:                        14.3   5.58  )-----------( 287      ( 04 Jul 2025 06:24 )             44.0     07-04    138  |  108  |  8   ----------------------------<  87  3.7   |  23  |  0.67    Ca    8.6      04 Jul 2025 06:24    TPro  7.0  /  Alb  3.1[L]  /  TBili  1.5[H]  /  DBili  x   /  AST  249[H]  /  ALT  756[H]  /  AlkPhos  454[H]  07-04    PT/INR - ( 04 Jul 2025 06:24 )   PT: 11.4 sec;   INR: 0.97 ratio         PTT - ( 04 Jul 2025 06:24 )  PTT:36.1 sec  Urinalysis Basic - ( 04 Jul 2025 06:24 )    Color: x / Appearance: x / SG: x / pH: x  Gluc: 87 mg/dL / Ketone: x  / Bili: x / Urobili: x   Blood: x / Protein: x / Nitrite: x   Leuk Esterase: x / RBC: x / WBC x   Sq Epi: x / Non Sq Epi: x / Bacteria: x              Assessment/Plan  Pt is a 55 yo M PMHx depression , HLD, HTN (not on medication), HLD, overactive bladder?  presenting  to ED for abnormal labs. Admitted for transaminitis.    - Toradol IV ordered to help subside pain and discomfort with sleeping.  - GI PCR ordered to evaluate diarrhea. Called by RN for pt experiencing trouble sleeping due to abdominal pain and diarrhea. On physical exam, mild tenderness to palpation in RUQ with no rebound/guarding. Negative Martinez sign. Pt rates pain as mild and reports that it is non-radiating. Pt reports diarrhea that is improving but still causing some discomfort as it is accompanied with gassiness.         T(C): 37.3 (07-04-25 @ 21:15), Max: 37.3 (07-04-25 @ 21:15)  HR: 56 (07-04-25 @ 21:15) (55 - 61)  BP: 111/73 (07-04-25 @ 21:15) (107/69 - 112/66)  RR: 18 (07-04-25 @ 21:15) (18 - 18)  SpO2: 96% (07-04-25 @ 21:15) (93% - 96%)  Wt(kg): --    Physical :  Gen- NAD, ncat  Cardio - s+1,s+2, rrr, no murmur  Lung - cta b/l, no wheeze, no rhonchi, no rales   Abdomen- mild tenderness to palpation in RUQ  Ext- no edema, 2+ pulses b/l  Neuro- CN grossly intact, strength 5/5 b/l extrem    LABS:                        14.3   5.58  )-----------( 287      ( 04 Jul 2025 06:24 )             44.0     07-04    138  |  108  |  8   ----------------------------<  87  3.7   |  23  |  0.67    Ca    8.6      04 Jul 2025 06:24    TPro  7.0  /  Alb  3.1[L]  /  TBili  1.5[H]  /  DBili  x   /  AST  249[H]  /  ALT  756[H]  /  AlkPhos  454[H]  07-04    PT/INR - ( 04 Jul 2025 06:24 )   PT: 11.4 sec;   INR: 0.97 ratio         PTT - ( 04 Jul 2025 06:24 )  PTT:36.1 sec  Urinalysis Basic - ( 04 Jul 2025 06:24 )    Color: x / Appearance: x / SG: x / pH: x  Gluc: 87 mg/dL / Ketone: x  / Bili: x / Urobili: x   Blood: x / Protein: x / Nitrite: x   Leuk Esterase: x / RBC: x / WBC x   Sq Epi: x / Non Sq Epi: x / Bacteria: x              Assessment/Plan  Pt is a 53 yo M PMHx depression , HLD, HTN (not on medication), HLD, overactive bladder?  presenting  to ED for abnormal labs. Admitted for transaminitis.    - Toradol IV ordered to help subside pain and discomfort with sleeping.  - GI PCR ordered to evaluate diarrhea.

## 2025-07-05 NOTE — PROGRESS NOTE ADULT - SUBJECTIVE AND OBJECTIVE BOX
SURGERY PA NOTE ON BEHALF OF DR. SIMS:    S: Patient seen and examined at bedside.   No acute events overnight.  Patient reports improved abdominal pain, he has no pain this AM.  Passing flatus.  Denies fevers, chills, chest pain, SOB, palpitations, calf pain.      MEDICATIONS:  aluminum hydroxide/magnesium hydroxide/simethicone Suspension 30 milliLiter(s) Oral every 4 hours PRN  cefTRIAXone   IVPB 1000 milliGRAM(s) IV Intermittent every 24 hours  enoxaparin Injectable 40 milliGRAM(s) SubCutaneous every 24 hours  melatonin 3 milliGRAM(s) Oral at bedtime PRN  ondansetron Injectable 4 milliGRAM(s) IV Push every 8 hours PRN  oxybutynin XL 10 milliGRAM(s) Oral daily  sodium chloride 0.9%. 1000 milliLiter(s) IV Continuous <Continuous>      O:  Vital Signs Last 24 Hrs  T(C): 36.9 (05 Jul 2025 04:35), Max: 37.3 (04 Jul 2025 21:15)  T(F): 98.5 (05 Jul 2025 04:35), Max: 99.1 (04 Jul 2025 21:15)  HR: 55 (05 Jul 2025 04:35) (55 - 61)  BP: 107/72 (05 Jul 2025 04:35) (107/72 - 112/66)  BP(mean): --  RR: 18 (05 Jul 2025 04:35) (18 - 18)  SpO2: 91% (05 Jul 2025 04:35) (91% - 96%)    Parameters below as of 05 Jul 2025 04:35  Patient On (Oxygen Delivery Method): room air        I&O SUMMARY:    07-04-25 @ 07:01  -  07-05-25 @ 07:00  --------------------------------------------------------  IN: 825 mL / OUT: 0 mL / NET: 825 mL        PHYSICAL EXAM:  Lungs: CTA bilat without W/R/R  Card: S1S2  Abd: Soft, NT, ND.  +BS x 4.  No rebound/guarding.    Ext: Calves soft, NT, without edema bilat    LABS:                        14.6   6.71  )-----------( 280      ( 05 Jul 2025 05:40 )             43.3     07-05    137  |  104  |  12  ----------------------------<  129[H]  3.5   |  26  |  0.88    Ca    8.9      05 Jul 2025 05:40    TPro  7.2  /  Alb  3.3  /  TBili  2.0[H]  /  DBili  x   /  AST  878[H]  /  ALT  1042[H]  /  AlkPhos  579[H]  07-05    PT/INR - ( 04 Jul 2025 06:24 )   PT: 11.4 sec;   INR: 0.97 ratio         PTT - ( 04 Jul 2025 06:24 )  PTT:36.1 sec

## 2025-07-05 NOTE — PROGRESS NOTE ADULT - ASSESSMENT
54 year old Male PMHx HTN, PSHx appendectomy with partial cecectomy (Dr. Rodgers, 2021) sent in by PCP for abnormal labs, found to have dilated CBD, and gallbladder with moderate distention.MRCP performed yseterday with 6mm CBD stone with biliary duct dilatation, distended gallbladdder with stones and mural edema.   No leukocytosis, TBili elevated (2.0) and rise in LFTs.  Abdominal exam reassuring.      Plan:  - Tentative plan for cholecystectomy this week  - Reg diet  - Appreciate GI team input for possibility of ERCP  - Rest of care per primary  - to discuss w/ Dr. Johnson

## 2025-07-05 NOTE — PROGRESS NOTE ADULT - SUBJECTIVE AND OBJECTIVE BOX
Patient is a 54y old  Male who presents with a chief complaint of Transaminitis (05 Jul 2025 10:27)    HPI:  Pt is a 53 yo M PMHx depression (possible Bipolar I?), HLD, HTN (not on medication), HLD, overactive bladder? presenting to ED for abnormal labs. Pt saw his PCP and got lab work, showing elevated LFTs so was instructed to go to ED. Pt has no acute complaints at present, denies abdominal pain, fever, chills, n/v/d/c.    ED Course:   Vitals: BP: 104/71, HR: 61, Temp: 98.1F, RR: 18, SpO2: 94% on RA   Labs:  WBC 5.32, Hg 14.5, K 3.5, Gluc 118, Tbili 2.2, D bili 1.2, Alk phos 515, AST//1132, lipase 172  CT: Gallbladder is moderately distended. No definite gallstones or gallbladder wall thickening. Increased caliber of the extrahepatic CBD measures up to 1.4 cm, with mild intrahepatic biliary prominence.   Received in the ED: 1000cc NS bolus x1 (02 Jul 2025 22:14)    INTERVAL HPI:  07/03: Pt seen and examined at bedside. Pt endorses an abundance if gas but denies any nausea, vomiting, constipation, diarrhea, abdominal pain, fever, or chills. Has no other complaints at this time. RUQ ultrasound unremarkable; MRCP shows evidence of 6mm distal common bile duct calculus with associated biliary duct dilatation- suggestive of acute cholecystitis. Diet advanced from NPO.  MRCP today ,elevated LFT  7/4: Pt seen ,examined  MRCP + for CBD stone, AC ramiro, on IV Rocephin, LFT's improving ,On PO diet  7/5: Pt seen and examined. Overnight with some RUQ pain, resolved with IV Toradol. Denies any nausea, vomiting, diarrhea or constipation. LFTs rise from yesterday. Remains on PO diet, tolerating well.     OVERNIGHT EVENTS: RUQ pain, given toradol with resolution of sxs.       Home Medications:  atorvastatin 10 mg oral tablet: 1 tab(s) orally once a day (at bedtime) (02 Jul 2025 22:27)  oxyBUTYnin 10 mg/24 hr oral tablet, extended release: 1 tab(s) orally once a day (02 Jul 2025 22:27)  sertraline 50 mg oral tablet: 1 tab(s) orally once a day (02 Jul 2025 22:27)      MEDICATIONS  (STANDING):  cefTRIAXone   IVPB 1000 milliGRAM(s) IV Intermittent every 24 hours  enoxaparin Injectable 40 milliGRAM(s) SubCutaneous every 24 hours  oxybutynin XL 10 milliGRAM(s) Oral daily  potassium chloride    Tablet ER 40 milliEquivalent(s) Oral once  sodium chloride 0.9%. 1000 milliLiter(s) (75 mL/Hr) IV Continuous <Continuous>    MEDICATIONS  (PRN):  aluminum hydroxide/magnesium hydroxide/simethicone Suspension 30 milliLiter(s) Oral every 4 hours PRN Dyspepsia  melatonin 3 milliGRAM(s) Oral at bedtime PRN Insomnia  ondansetron Injectable 4 milliGRAM(s) IV Push every 8 hours PRN Nausea and/or Vomiting      Allergies    No Known Allergies    Intolerances        Social History:       REVIEW OF SYSTEMS:   CONSTITUTIONAL: No fever, No chills, No fatigue, No myalgia, No Body ache, No Weakness  EYES: No eye pain,  No visual disturbances, No discharge, NO Redness  ENMT:  No ear pain, No nose bleed, No vertigo; No sinus pain, NO throat pain, No Congestion  NECK: No pain, No stiffness  RESPIRATORY: No cough, NO wheezing, No  hemoptysis, NO  shortness of breath  CARDIOVASCULAR: No chest pain, palpitations  GASTROINTESTINAL: No abdominal pain, NO epigastric pain. No nausea, No vomiting; No diarrhea, No constipation. [ x ] BM  GENITOURINARY: No dysuria, No frequency, No urgency, No hematuria, NO incontinence  NEUROLOGICAL: No headaches, No dizziness, No numbness, No tingling, No tremors, No weakness  EXT: No Swelling, No Pain, No Edema  SKIN:  [ x ] No itching, burning, rashes, or lesions   MUSCULOSKELETAL: No joint pain ,No Jt swelling; No muscle pain, No back pain, No extremity pain  PSYCHIATRIC: No depression,  No anxiety,  No mood swings ,No difficulty sleeping at night  PAIN SCALE: [x  ] None  [  ] Other-  ROS Unable to obtain due to - [  ] Dementia  [  ] Lethargy [  ] Drowsy [  ] Sedated [  ] non verbal  REST OF REVIEW Of SYSTEM - [x  ] Normal     Vital Signs Last 24 Hrs  T(C): 36.8 (05 Jul 2025 12:32), Max: 37.3 (04 Jul 2025 21:15)  T(F): 98.2 (05 Jul 2025 12:32), Max: 99.1 (04 Jul 2025 21:15)  HR: 60 (05 Jul 2025 12:32) (55 - 60)  BP: 113/71 (05 Jul 2025 12:32) (107/72 - 113/71)  BP(mean): --  RR: 18 (05 Jul 2025 12:32) (18 - 18)  SpO2: 92% (05 Jul 2025 12:32) (91% - 96%)    Parameters below as of 05 Jul 2025 12:32  Patient On (Oxygen Delivery Method): room air      Finger Stick        07-04 @ 07:01  -  07-05 @ 07:00  --------------------------------------------------------  IN: 825 mL / OUT: 0 mL / NET: 825 mL        PHYSICAL EXAM:  GENERAL:  [ X ] NAD , [  X] well appearing, [  ] Agitated, [  ] Drowsy,  [  ] Lethargy, [  ] confused   HEAD:  [ X ] Normal, [  ] Other  EYES:  [  X] EOMI, [ X ] PERRLA, [X  ] conjunctiva and sclera clear normal, [  ] Other,  [  ] Pallor,[  ] Discharge  ENMT:  [ X ] Normal, [  X] Moist mucous membranes, [X  ] Good dentition, [ x ] No Thrush  NECK:  [ X ] Supple, [ X ] No JVD, [ X ] Normal thyroid, [  ] Lymphadenopathy [  ] Other  CHEST/LUNG:  [ X ] Clear to auscultation bilaterally, [ X ] Breath Sounds equal B/L / Decrease, [  ] poor effort  [ x ] No rales, [ x ] No rhonchi  [ x ]  No wheezing,   HEART:  [X  ] Regular rate and rhythm, [  ] tachycardia, [  ] Bradycardia,  [  ] irregular  [X  ] No murmurs, No rubs, No gallops, [  ] PPM in place (Mfr:  )  ABDOMEN:  [ X ] Soft, [X  ] Nontender, [X  ] Nondistended, [X  ] No mass, [X  ] Bowel sounds present, [ x ] obese  NERVOUS SYSTEM:  [X ] Alert & Oriented X3, [X  ] Nonfocal  [  ] Confusion  [  ] Encephalopathic [  ] Sedated [  ] Unable to assess, [  ] Dementia [  ] Other-  EXTREMITIES: [X  ] 2+ Peripheral Pulses, No clubbing, No cyanosis,  [  ] edema B/L lower EXT. [  ] PVD stasis skin changes B/L Lower EXT, [  ] wound  LYMPH: No lymphadenopathy noted  SKIN:  [ X ] No rashes or lesions, [  ] Pressure Ulcers, [  ] ecchymosis, [  ] Skin Tears, [  ] Other    DIET: Diet, DASH/TLC:   Sodium & Cholesterol Restricted (07-04-25 @ 09:39)      LABS:                        14.6   6.71  )-----------( 280      ( 05 Jul 2025 05:40 )             43.3     05 Jul 2025 05:40    137    |  104    |  12     ----------------------------<  129    3.5     |  26     |  0.88     Ca    8.9        05 Jul 2025 05:40    TPro  7.2    /  Alb  3.3    /  TBili  2.0    /  DBili  x      /  AST  878    /  ALT  1042   /  AlkPhos  579    05 Jul 2025 05:40    PT/INR - ( 04 Jul 2025 06:24 )   PT: 11.4 sec;   INR: 0.97 ratio         PTT - ( 04 Jul 2025 06:24 )  PTT:36.1 sec  Urinalysis Basic - ( 05 Jul 2025 05:40 )    Color: x / Appearance: x / SG: x / pH: x  Gluc: 129 mg/dL / Ketone: x  / Bili: x / Urobili: x   Blood: x / Protein: x / Nitrite: x   Leuk Esterase: x / RBC: x / WBC x   Sq Epi: x / Non Sq Epi: x / Bacteria: x                           Anemia Panel:      Thyroid Panel:        Lipase: 172 U/L (07-02-25 @ 17:50)          RADIOLOGY & ADDITIONAL TESTS: < from: MR MRCP w/wo IV Cont (07.03.25 @ 13:29) >  ACC: [id number] EXAM:  MR MRCP WAW IC   ORDERED BY: ANUP AYALA     PROCEDURE DATE:  07/03/2025          INTERPRETATION:  CLINICAL INFORMATION: Transaminitis. Dilated common bile   duct on CT    COMPARISON: CT abdomen pelvis 7/2/2025; abdominal ultrasound 7/2/2025    CONTRAST/COMPLICATIONS:  IV Contrast: Gadavist  9 cc administered  Oral Contrast: NONE.    PROCEDURE:  MRI of the abdomen was performed.  MRCP was performed.    FINDINGS:  LOWER CHEST: Within normal limits.    LIVER: Within normal limits.  BILE DUCTS: 6 mm distal common bile duct stone (series 5, image 21) with   associated duct dilatation measuring up to 1.3 cm  GALLBLADDER: Multiple layering gallstones. Mildly distended gallbladder   with mildly edematous wall.  SPLEEN: Within normal limits.  PANCREAS: Within normal limits.  ADRENALS: Within normal limits.  KIDNEYS/URETERS: Subcentimeter bilateral renal cysts.    VISUALIZED PORTIONS:  BOWEL: Within normal limits.  PERITONEUM: No ascites.  VESSELS: Within normal limits.  RETROPERITONEUM/LYMPH NODES: No lymphadenopathy.  ABDOMINAL WALL: Within normal limits.  BONES: Within normal limits.    IMPRESSION:  6 mm distal common bile duct calculus with associated biliary duct   dilatation.    Distended gallbladder with stones and mural edema. Findings are   suggestive of acute cholecystitis in the appropriate clinical context.        --- End of Report ---    < end of copied text >        HEALTH ISSUES - PROBLEM Dx:  Transaminitis    Need for prophylactic measure    Anxiety and depression    HLD (hyperlipidemia)    HTN (hypertension)    OAB (overactive bladder)            Consultant(s) Notes Reviewed:  [ X ] YES     Care Discussed with [X] Consultants  [ X ] Patient  [  ] Family [  ] HCP [  ]   [  ] Social Service  [ X ] RN, [  ] Physical Therapy,[  ] Palliative care team  DVT PPX: [X  ] Lovenox, [  ] S C Heparin, [  ] Coumadin, [  ] Xarelto, [  ] Eliquis, [  ] Pradaxa, [  ] IV Heparin drip, [  ] SCD [  ] Contraindication 2 to GI Bleed,[  ] Ambulation [  ] Contraindicated 2 to  bleed [  ] Contraindicated 2 to Brain Bleed  Advanced directive: [ X ] None, [  ] DNR/DNI

## 2025-07-05 NOTE — PROGRESS NOTE ADULT - ASSESSMENT
elevated lfts    7/3 MRCP: 6 mm distal common bile duct calculus with associated biliary duct dilatation.    Plan:  - CT noted with dilated ducts however u/s shows non-dilated duct  - MRCP noted, positive for CBD stone  - Tentative ERCP as add on for Monday if there is OR availability  - Monitor WBC and LFTs, bilirubin  - Diet ok for now  - Will need NPO after midnight on Sunday evening  - D/w pt

## 2025-07-05 NOTE — PROGRESS NOTE ADULT - SUBJECTIVE AND OBJECTIVE BOX
[facility]  Yasmany Snell NP  [address] [place] [address]  [phone]      INTERVAL HPI/OVERNIGHT EVENTS:  Pt s/e  Pt had abdominal pain RUQ overnight  Currently pain improved with pain regimen    MEDICATIONS  (STANDING):  cefTRIAXone   IVPB 1000 milliGRAM(s) IV Intermittent every 24 hours  enoxaparin Injectable 40 milliGRAM(s) SubCutaneous every 24 hours  oxybutynin XL 10 milliGRAM(s) Oral daily  sodium chloride 0.9%. 1000 milliLiter(s) (75 mL/Hr) IV Continuous <Continuous>    MEDICATIONS  (PRN):  aluminum hydroxide/magnesium hydroxide/simethicone Suspension 30 milliLiter(s) Oral every 4 hours PRN Dyspepsia  melatonin 3 milliGRAM(s) Oral at bedtime PRN Insomnia  ondansetron Injectable 4 milliGRAM(s) IV Push every 8 hours PRN Nausea and/or Vomiting      Allergies  No Known Allergies      PHYSICAL EXAM:   Vital Signs:  Vital Signs Last 24 Hrs  T(C): 36.9 (2025 04:35), Max: 37.3 (2025 21:15)  T(F): 98.5 (2025 04:35), Max: 99.1 (2025 21:15)  HR: 55 (2025 04:35) (55 - 61)  BP: 107/72 (2025 04:35) (107/72 - 112/66)  BP(mean): --  RR: 18 (2025 04:35) (18 - 18)  SpO2: 91% (2025 04:35) (91% - 96%)    Parameters below as of 2025 04:35  Patient On (Oxygen Delivery Method): room air      Daily     Daily Weight in k.8 (2025 04:35)    GENERAL:  Appears stated age  HEENT:  NC/AT  CHEST:  Full & symmetric excursion  HEART:  Regular rhythm  ABDOMEN:  Soft, non-tender, non-distended  EXTEREMITIES:  no cyanosis  SKIN:  No rash  NEURO:  Alert      LABS:                        14.6   6.71  )-----------( 280      ( 2025 05:40 )             43.3     07-    137  |  104  |  12  ----------------------------<  129[H]  3.5   |  26  |  0.88    Ca    8.9      2025 05:40    TPro  7.2  /  Alb  3.3  /  TBili  2.0[H]  /  DBili  x   /  AST  878[H]  /  ALT  1042[H]  /  AlkPhos  579[H]  07-05    PT/INR - ( 2025 06:24 )   PT: 11.4 sec;   INR: 0.97 ratio         PTT - ( 2025 06:24 )  PTT:36.1 sec  Urinalysis Basic - ( 2025 05:40 )    Color: x / Appearance: x / SG: x / pH: x  Gluc: 129 mg/dL / Ketone: x  / Bili: x / Urobili: x   Blood: x / Protein: x / Nitrite: x   Leuk Esterase: x / RBC: x / WBC x   Sq Epi: x / Non Sq Epi: x / Bacteria: x

## 2025-07-05 NOTE — PROGRESS NOTE ADULT - PROBLEM SELECTOR PLAN 1
transaminitis-elevated LFT's -CBD stone obstruction  - Afebrile, no leukocytosis.  - CT A/P with IV contrast: gallbladder is moderately distended. No definite gallstones or gallbladder wall thickening. Increased caliber of the extrahepatic CBD measures up to 1.4 cm, with mild intrahepatic biliary prominence.   - RUQ US WNL  - MRCP 6 mm distal common bile duct calculus with associated biliary duct dilatation AC ramiro   - advanced diet   - AM  LFTs  - Rocephin 1g QD   - Hold home statin  - Avoid hepatotoxic meds  - GI (Dr. Solorio   d/w Ok for Po diet , ERCP on Monday  Start IV Abx as AC Ramiro   - Surgery (Dr. Johnson)  follow up with abnormal MRCP + AC ramiro d/w Sx PA no plan

## 2025-07-06 LAB
ALBUMIN SERPL ELPH-MCNC: 3.1 G/DL — LOW (ref 3.3–5)
ALP SERPL-CCNC: 554 U/L — HIGH (ref 40–120)
ALT FLD-CCNC: 885 U/L — HIGH (ref 12–78)
ANION GAP SERPL CALC-SCNC: 5 MMOL/L — SIGNIFICANT CHANGE UP (ref 5–17)
AST SERPL-CCNC: 375 U/L — HIGH (ref 15–37)
BILIRUB SERPL-MCNC: 1.2 MG/DL — SIGNIFICANT CHANGE UP (ref 0.2–1.2)
BUN SERPL-MCNC: 8 MG/DL — SIGNIFICANT CHANGE UP (ref 7–23)
CALCIUM SERPL-MCNC: 9 MG/DL — SIGNIFICANT CHANGE UP (ref 8.5–10.1)
CHLORIDE SERPL-SCNC: 107 MMOL/L — SIGNIFICANT CHANGE UP (ref 96–108)
CO2 SERPL-SCNC: 25 MMOL/L — SIGNIFICANT CHANGE UP (ref 22–31)
CREAT SERPL-MCNC: 0.69 MG/DL — SIGNIFICANT CHANGE UP (ref 0.5–1.3)
EGFR: 111 ML/MIN/1.73M2 — SIGNIFICANT CHANGE UP
EGFR: 111 ML/MIN/1.73M2 — SIGNIFICANT CHANGE UP
GLUCOSE SERPL-MCNC: 98 MG/DL — SIGNIFICANT CHANGE UP (ref 70–99)
HCT VFR BLD CALC: 43.6 % — SIGNIFICANT CHANGE UP (ref 39–50)
HGB BLD-MCNC: 14.4 G/DL — SIGNIFICANT CHANGE UP (ref 13–17)
MCHC RBC-ENTMCNC: 29.8 PG — SIGNIFICANT CHANGE UP (ref 27–34)
MCHC RBC-ENTMCNC: 33 G/DL — SIGNIFICANT CHANGE UP (ref 32–36)
MCV RBC AUTO: 90.3 FL — SIGNIFICANT CHANGE UP (ref 80–100)
NRBC # BLD AUTO: 0 K/UL — SIGNIFICANT CHANGE UP (ref 0–0)
NRBC # FLD: 0 K/UL — SIGNIFICANT CHANGE UP (ref 0–0)
NRBC BLD AUTO-RTO: 0 /100 WBCS — SIGNIFICANT CHANGE UP (ref 0–0)
PLATELET # BLD AUTO: 305 K/UL — SIGNIFICANT CHANGE UP (ref 150–400)
PMV BLD: 9.1 FL — SIGNIFICANT CHANGE UP (ref 7–13)
POTASSIUM SERPL-MCNC: 3.5 MMOL/L — SIGNIFICANT CHANGE UP (ref 3.5–5.3)
POTASSIUM SERPL-SCNC: 3.5 MMOL/L — SIGNIFICANT CHANGE UP (ref 3.5–5.3)
PROT SERPL-MCNC: 7 G/DL — SIGNIFICANT CHANGE UP (ref 6–8.3)
RBC # BLD: 4.83 M/UL — SIGNIFICANT CHANGE UP (ref 4.2–5.8)
RBC # FLD: 15.2 % — HIGH (ref 10.3–14.5)
SODIUM SERPL-SCNC: 137 MMOL/L — SIGNIFICANT CHANGE UP (ref 135–145)
WBC # BLD: 8 K/UL — SIGNIFICANT CHANGE UP (ref 3.8–10.5)
WBC # FLD AUTO: 8 K/UL — SIGNIFICANT CHANGE UP (ref 3.8–10.5)

## 2025-07-06 PROCEDURE — 71045 X-RAY EXAM CHEST 1 VIEW: CPT | Mod: 26

## 2025-07-06 PROCEDURE — 93010 ELECTROCARDIOGRAM REPORT: CPT

## 2025-07-06 RX ORDER — INDOCYANINE GREEN AND WATER 25 MG
5 KIT INJECTION ONCE
Refills: 0 | Status: COMPLETED | OUTPATIENT
Start: 2025-07-07 | End: 2025-07-07

## 2025-07-06 RX ADMIN — Medication 75 MILLILITER(S): at 05:15

## 2025-07-06 RX ADMIN — ENOXAPARIN SODIUM 40 MILLIGRAM(S): 100 INJECTION SUBCUTANEOUS at 09:59

## 2025-07-06 RX ADMIN — CEFTRIAXONE 100 MILLIGRAM(S): 500 INJECTION, POWDER, FOR SOLUTION INTRAMUSCULAR; INTRAVENOUS at 18:00

## 2025-07-06 RX ADMIN — OXYBUTYNIN CHLORIDE 10 MILLIGRAM(S): 5 TABLET, FILM COATED, EXTENDED RELEASE ORAL at 12:15

## 2025-07-06 RX ADMIN — Medication 75 MILLILITER(S): at 18:00

## 2025-07-06 NOTE — PROGRESS NOTE ADULT - PROBLEM SELECTOR PLAN 1
transaminitis-elevated LFT's -CBD stone obstruction  - Afebrile, no leukocytosis.  - CT A/P with IV contrast: gallbladder is moderately distended. No definite gallstones or gallbladder wall thickening. Increased caliber of the extrahepatic CBD measures up to 1.4 cm, with mild intrahepatic biliary prominence.   - RUQ US WNL  - MRCP 6 mm distal common bile duct calculus with associated biliary duct dilatation AC ramiro   - advanced diet   - AM  LFTs  - Rocephin 1g QD   - Hold home statin  - Avoid hepatotoxic meds  - GI (Dr. Solorio   d/w Ok for Po diet , ERCP on Monday  Start IV Abx as AC Ramiro   - Surgery (Dr. Johnson)  follow up with abnormal MRCP + AC ramiro d/w Sx PA no plan transaminitis-elevated LFT's -CBD stone obstruction  - Afebrile, no leukocytosis.  - CT A/P with IV contrast: gallbladder is moderately distended. No definite gallstones or gallbladder wall thickening. Increased caliber of the extrahepatic CBD measures up to 1.4 cm, with mild intrahepatic biliary prominence.   - RUQ US WNL  - MRCP 6 mm distal common bile duct calculus with associated biliary duct dilatation AC ramiro   - advanced diet   - AM  LFTs  - Rocephin 1g QD   - Hold home statin  - Avoid hepatotoxic meds  - GI (Dr. Solorio   d/w Ok for Po diet , ERCP on Tuesday ,On  IV Abx as AC Ramiro   - Surgery (Dr. Johnson)  OR on 7/6 for Lap Ramiro

## 2025-07-06 NOTE — PROGRESS NOTE ADULT - ASSESSMENT
54 year old Male PMHx HTN, PSHx appendectomy with partial cecectomy (Dr. Rodgers, 2021) sent in by PCP for abnormal labs, found to have dilated CBD, and gallbladder with moderate distention.MRCP performed yseterday with 6mm CBD stone with biliary duct dilatation, distended gallbladdder with stones and mural edema.   No leukocytosis, Tbili downtrending (1.2) and downtrending LFTs. Abdominal exam reassuring.      Plan:  - Tentative plan for cholecystectomy this week  - Reg diet  - Appreciate GI team input for possibility of ERCP  - Rest of care per primary  - to discuss w/ Dr. Johnson

## 2025-07-06 NOTE — PROGRESS NOTE ADULT - ASSESSMENT
elevated lfts    7/3 MRCP: 6 mm distal common bile duct calculus with associated biliary duct dilatation.    Plan:  - CT noted with dilated ducts however u/s shows non-dilated duct  - MRCP noted, positive for CBD stone  - ERCP will likely be on Tuesday due to schedule conflict  - d/w surgery team, possible cholecystectomy tomorrow and ERCP Tuesday  - Monitor WBC and LFTs, bilirubin  - Diet ok for now  - Will need NPO after midnight on Sunday evening  - Discussed with primary attending  - D/w pt

## 2025-07-06 NOTE — PROGRESS NOTE ADULT - SUBJECTIVE AND OBJECTIVE BOX
Patient is a 54y old  Male who presents with a chief complaint of Transaminitis (06 Jul 2025 10:53)    HPI:  Pt is a 55 yo M PMHx depression (possible Bipolar I?), HLD, HTN (not on medication), HLD, overactive bladder? presenting to ED for abnormal labs. Pt saw his PCP and got lab work, showing elevated LFTs so was instructed to go to ED. Pt has no acute complaints at present, denies abdominal pain, fever, chills, n/v/d/c.    ED Course:   Vitals: BP: 104/71, HR: 61, Temp: 98.1F, RR: 18, SpO2: 94% on RA   Labs:  WBC 5.32, Hg 14.5, K 3.5, Gluc 118, Tbili 2.2, D bili 1.2, Alk phos 515, AST//1132, lipase 172  CT: Gallbladder is moderately distended. No definite gallstones or gallbladder wall thickening. Increased caliber of the extrahepatic CBD measures up to 1.4 cm, with mild intrahepatic biliary prominence.   Received in the ED: 1000cc NS bolus x1 (02 Jul 2025 22:14)    INTERVAL HPI:  07/03: Pt seen and examined at bedside. Pt endorses an abundance if gas but denies any nausea, vomiting, constipation, diarrhea, abdominal pain, fever, or chills. Has no other complaints at this time. RUQ ultrasound unremarkable; MRCP shows evidence of 6mm distal common bile duct calculus with associated biliary duct dilatation- suggestive of acute cholecystitis. Diet advanced from NPO.  MRCP today ,elevated LFT  7/4: Pt seen ,examined  MRCP + for CBD stone, AC ramiro, on IV Rocephin, LFT's improving ,On PO diet  7/5: Pt seen and examined. Overnight with some RUQ pain, resolved with IV Toradol. Denies any nausea, vomiting, diarrhea or constipation. LFTs rise from yesterday. Remains on PO diet, tolerating well.   7/6: Pt seen and examined. Has no acute complaints at this time and denies nausea, vomiting, diarrhea or constipation. LFT's downtrending today but still high. Pt scheduled for lap choly tomorrow, started NPO and continue holding Lovenox. ERCP pending possibly Tuesday.       OVERNIGHT EVENTS:  None    Home Medications:  atorvastatin 10 mg oral tablet: 1 tab(s) orally once a day (at bedtime) (02 Jul 2025 22:27)  oxyBUTYnin 10 mg/24 hr oral tablet, extended release: 1 tab(s) orally once a day (02 Jul 2025 22:27)  sertraline 50 mg oral tablet: 1 tab(s) orally once a day (02 Jul 2025 22:27)      MEDICATIONS  (STANDING):  cefTRIAXone   IVPB 1000 milliGRAM(s) IV Intermittent every 24 hours  oxybutynin XL 10 milliGRAM(s) Oral daily  sodium chloride 0.9%. 1000 milliLiter(s) (75 mL/Hr) IV Continuous <Continuous>    MEDICATIONS  (PRN):  aluminum hydroxide/magnesium hydroxide/simethicone Suspension 30 milliLiter(s) Oral every 4 hours PRN Dyspepsia  melatonin 3 milliGRAM(s) Oral at bedtime PRN Insomnia  ondansetron Injectable 4 milliGRAM(s) IV Push every 8 hours PRN Nausea and/or Vomiting      Allergies    No Known Allergies    Intolerances        Social History:      REVIEW OF SYSTEMS:  CONSTITUTIONAL: No fever, No chills, No fatigue, No myalgia, No Body ache, No Weakness  EYES: No eye pain,  No visual disturbances, No discharge, NO Redness  ENMT:  No ear pain, No nose bleed, No vertigo; No sinus pain, NO throat pain, No Congestion  NECK: No pain, No stiffness  RESPIRATORY: No cough, NO wheezing, No  hemoptysis, NO  shortness of breath  CARDIOVASCULAR: No chest pain, palpitations  GASTROINTESTINAL: No abdominal pain, NO epigastric pain. No nausea, No vomiting; No diarrhea, No constipation. [  ] BM  GENITOURINARY: No dysuria, No frequency, No urgency, No hematuria, NO incontinence  NEUROLOGICAL: No headaches, No dizziness, No numbness, No tingling, No tremors, No weakness  EXT: No Swelling, No Pain, No Edema  SKIN:  [  ] No itching, burning, rashes, or lesions   MUSCULOSKELETAL: No joint pain ,No Jt swelling; No muscle pain, No back pain, No extremity pain  PSYCHIATRIC: No depression,  No anxiety,  No mood swings ,No difficulty sleeping at night  PAIN SCALE: [  ] None  [  ] Other-  ROS Unable to obtain due to - [  ] Dementia  [  ] Lethargy [  ] Drowsy [  ] Sedated [  ] non verbal  REST OF REVIEW Of SYSTEM - [  ] Normal     Vital Signs Last 24 Hrs  T(C): 37.3 (06 Jul 2025 12:06), Max: 37.3 (06 Jul 2025 12:06)  T(F): 99.1 (06 Jul 2025 12:06), Max: 99.1 (06 Jul 2025 12:06)  HR: 68 (06 Jul 2025 12:06) (66 - 75)  BP: 93/59 (06 Jul 2025 12:06) (93/59 - 118/80)  BP(mean): --  RR: 18 (06 Jul 2025 12:06) (18 - 18)  SpO2: 94% (06 Jul 2025 12:06) (94% - 95%)    Parameters below as of 06 Jul 2025 12:06  Patient On (Oxygen Delivery Method): room air      Finger Stick        07-05 @ 07:01  -  07-06 @ 07:00  --------------------------------------------------------  IN: 1890 mL / OUT: 0 mL / NET: 1890 mL        PHYSICAL EXAM:  GENERAL:  [  ] NAD , [  ] well appearing, [  ] Agitated, [  ] Drowsy,  [  ] Lethargy, [  ] confused   HEAD:  [  ] Normal, [  ] Other  EYES:  [  ] EOMI, [  ] PERRLA, [  ] conjunctiva and sclera clear normal, [  ] Other,  [  ] Pallor,[  ] Discharge  ENMT:  [  ] Normal, [  ] Moist mucous membranes, [  ] Good dentition, [  ] No Thrush  NECK:  [  ] Supple, [  ] No JVD, [  ] Normal thyroid, [  ] Lymphadenopathy [  ] Other  CHEST/LUNG:  [  ] Clear to auscultation bilaterally, [  ] Breath Sounds equal B/L / Decrease, [  ] poor effort  [  ] No rales, [  ] No rhonchi  [  ]  No wheezing,   HEART:  [  ] Regular rate and rhythm, [  ] tachycardia, [  ] Bradycardia,  [  ] irregular  [  ] No murmurs, No rubs, No gallops, [  ] PPM in place (Mfr:  )  ABDOMEN:  [  ] Soft, [  ] Nontender, [  ] Nondistended, [  ] No mass, [  ] Bowel sounds present, [  ] obese  NERVOUS SYSTEM:  [  ] Alert & Oriented X3, [  ] Nonfocal  [  ] Confusion  [  ] Encephalopathic [  ] Sedated [  ] Unable to assess, [  ] Dementia [  ] Other-  EXTREMITIES: [  ] 2+ Peripheral Pulses, No clubbing, No cyanosis,  [  ] edema B/L lower EXT. [  ] PVD stasis skin changes B/L Lower EXT, [  ] wound  LYMPH: No lymphadenopathy noted  SKIN:  [  ] No rashes or lesions, [  ] Pressure Ulcers, [  ] ecchymosis, [  ] Skin Tears, [  ] Other    DIET: Diet, NPO after Midnight:      NPO Start Date: 06-Jul-2025,   NPO Start Time: 23:59  Except Medications (07-06-25 @ 10:58)  Diet, DASH/TLC:   Sodium & Cholesterol Restricted (07-04-25 @ 09:39)      LABS:                        14.4   8.00  )-----------( 305      ( 06 Jul 2025 04:35 )             43.6     06 Jul 2025 04:35    137    |  107    |  8      ----------------------------<  98     3.5     |  25     |  0.69     Ca    9.0        06 Jul 2025 04:35    TPro  7.0    /  Alb  3.1    /  TBili  1.2    /  DBili  0.4    /  AST  375    /  ALT  885    /  AlkPhos  554    06 Jul 2025 04:35      Urinalysis Basic - ( 06 Jul 2025 04:35 )    Color: x / Appearance: x / SG: x / pH: x  Gluc: 98 mg/dL / Ketone: x  / Bili: x / Urobili: x   Blood: x / Protein: x / Nitrite: x   Leuk Esterase: x / RBC: x / WBC x   Sq Epi: x / Non Sq Epi: x / Bacteria: x                           Anemia Panel:      Thyroid Panel:        Lipase: 172 U/L (07-02-25 @ 17:50)          RADIOLOGY & ADDITIONAL TESTS:      HEALTH ISSUES - PROBLEM Dx:  Transaminitis    Need for prophylactic measure    Anxiety and depression    HLD (hyperlipidemia)    HTN (hypertension)    OAB (overactive bladder)            Consultant(s) Notes Reviewed:  [  ] YES     Care Discussed with [X] Consultants  [  ] Patient  [  ] Family [  ] HCP [  ]   [  ] Social Service  [  ] RN, [  ] Physical Therapy,[  ] Palliative care team  DVT PPX: [  ] Lovenox, [  ] S C Heparin, [  ] Coumadin, [  ] Xarelto, [  ] Eliquis, [  ] Pradaxa, [  ] IV Heparin drip, [  ] SCD [  ] Contraindication 2 to GI Bleed,[  ] Ambulation [  ] Contraindicated 2 to  bleed [  ] Contraindicated 2 to Brain Bleed  Advanced directive: [  ] None, [  ] DNR/DNI Patient is a 54y old  Male who presents with a chief complaint of Transaminitis (06 Jul 2025 10:53)    HPI:  Pt is a 55 yo M PMHx depression (possible Bipolar I?), HLD, HTN (not on medication), HLD, overactive bladder? presenting to ED for abnormal labs. Pt saw his PCP and got lab work, showing elevated LFTs so was instructed to go to ED. Pt has no acute complaints at present, denies abdominal pain, fever, chills, n/v/d/c.    ED Course:   Vitals: BP: 104/71, HR: 61, Temp: 98.1F, RR: 18, SpO2: 94% on RA   Labs:  WBC 5.32, Hg 14.5, K 3.5, Gluc 118, Tbili 2.2, D bili 1.2, Alk phos 515, AST//1132, lipase 172  CT: Gallbladder is moderately distended. No definite gallstones or gallbladder wall thickening. Increased caliber of the extrahepatic CBD measures up to 1.4 cm, with mild intrahepatic biliary prominence.   Received in the ED: 1000cc NS bolus x1 (02 Jul 2025 22:14)    INTERVAL HPI:  07/03: Pt seen and examined at bedside. Pt endorses an abundance if gas but denies any nausea, vomiting, constipation, diarrhea, abdominal pain, fever, or chills. Has no other complaints at this time. RUQ ultrasound unremarkable; MRCP shows evidence of 6mm distal common bile duct calculus with associated biliary duct dilatation- suggestive of acute cholecystitis. Diet advanced from NPO.  MRCP today ,elevated LFT  7/4: Pt seen ,examined  MRCP + for CBD stone, AC ramiro, on IV Rocephin, LFT's improving ,On PO diet  7/5: Pt seen and examined. Overnight with some RUQ pain, resolved with IV Toradol. Denies any nausea, vomiting, diarrhea or constipation. LFTs rise from yesterday. Remains on PO diet, tolerating well.   7/6: Pt seen and examined. Has no acute complaints at this time and denies nausea, vomiting, diarrhea or constipation. LFT's downtrending today but still high. Pt scheduled for lap choly tomorrow, started NPO and continue holding Lovenox. ERCP pending possibly Tuesday.       OVERNIGHT EVENTS:  None    Home Medications:  atorvastatin 10 mg oral tablet: 1 tab(s) orally once a day (at bedtime) (02 Jul 2025 22:27)  oxyBUTYnin 10 mg/24 hr oral tablet, extended release: 1 tab(s) orally once a day (02 Jul 2025 22:27)  sertraline 50 mg oral tablet: 1 tab(s) orally once a day (02 Jul 2025 22:27)      MEDICATIONS  (STANDING):  cefTRIAXone   IVPB 1000 milliGRAM(s) IV Intermittent every 24 hours  oxybutynin XL 10 milliGRAM(s) Oral daily  sodium chloride 0.9%. 1000 milliLiter(s) (75 mL/Hr) IV Continuous <Continuous>    MEDICATIONS  (PRN):  aluminum hydroxide/magnesium hydroxide/simethicone Suspension 30 milliLiter(s) Oral every 4 hours PRN Dyspepsia  melatonin 3 milliGRAM(s) Oral at bedtime PRN Insomnia  ondansetron Injectable 4 milliGRAM(s) IV Push every 8 hours PRN Nausea and/or Vomiting      Allergies    No Known Allergies    Intolerances        Social History:      REVIEW OF SYSTEMS:  CONSTITUTIONAL: No fever, No chills, No fatigue, No myalgia, No Body ache, No Weakness  EYES: No eye pain,  No visual disturbances, No discharge, NO Redness  ENMT:  No ear pain, No nose bleed, No vertigo; No sinus pain, NO throat pain, No Congestion  NECK: No pain, No stiffness  RESPIRATORY: No cough, NO wheezing, No  hemoptysis, NO  shortness of breath  CARDIOVASCULAR: No chest pain, palpitations  GASTROINTESTINAL: No abdominal pain, NO epigastric pain. No nausea, No vomiting; No diarrhea, No constipation. [  ] BM  GENITOURINARY: No dysuria, No frequency, No urgency, No hematuria, NO incontinence  NEUROLOGICAL: No headaches, No dizziness, No numbness, No tingling, No tremors, No weakness  EXT: No Swelling, No Pain, No Edema  SKIN:  [  ] No itching, burning, rashes, or lesions   MUSCULOSKELETAL: No joint pain ,No Jt swelling; No muscle pain, No back pain, No extremity pain  PSYCHIATRIC: No depression,  No anxiety,  No mood swings ,No difficulty sleeping at night  PAIN SCALE: [  ] None  [  ] Other-  ROS Unable to obtain due to - [  ] Dementia  [  ] Lethargy [  ] Drowsy [  ] Sedated [  ] non verbal  REST OF REVIEW Of SYSTEM - [  ] Normal     Vital Signs Last 24 Hrs  T(C): 37.3 (06 Jul 2025 12:06), Max: 37.3 (06 Jul 2025 12:06)  T(F): 99.1 (06 Jul 2025 12:06), Max: 99.1 (06 Jul 2025 12:06)  HR: 68 (06 Jul 2025 12:06) (66 - 75)  BP: 93/59 (06 Jul 2025 12:06) (93/59 - 118/80)  BP(mean): --  RR: 18 (06 Jul 2025 12:06) (18 - 18)  SpO2: 94% (06 Jul 2025 12:06) (94% - 95%)    Parameters below as of 06 Jul 2025 12:06  Patient On (Oxygen Delivery Method): room air      Finger Stick        07-05 @ 07:01  -  07-06 @ 07:00  --------------------------------------------------------  IN: 1890 mL / OUT: 0 mL / NET: 1890 mL        PHYSICAL EXAM:  GENERAL:  [  ] NAD , [  ] well appearing, [  ] Agitated, [  ] Drowsy,  [  ] Lethargy, [  ] confused   HEAD:  [  ] Normal, [  ] Other  EYES:  [  ] EOMI, [  ] PERRLA, [  ] conjunctiva and sclera clear normal, [  ] Other,  [  ] Pallor,[  ] Discharge  ENMT:  [  ] Normal, [  ] Moist mucous membranes, [  ] Good dentition, [  ] No Thrush  NECK:  [  ] Supple, [  ] No JVD, [  ] Normal thyroid, [  ] Lymphadenopathy [  ] Other  CHEST/LUNG:  [  ] Clear to auscultation bilaterally, [  ] Breath Sounds equal B/L / Decrease, [  ] poor effort  [  ] No rales, [  ] No rhonchi  [  ]  No wheezing,   HEART:  [  ] Regular rate and rhythm, [  ] tachycardia, [  ] Bradycardia,  [  ] irregular  [  ] No murmurs, No rubs, No gallops, [  ] PPM in place (Mfr:  )  ABDOMEN:  [  ] Soft, [  ] Nontender, [  ] Nondistended, [  ] No mass, [  ] Bowel sounds present, [  ] obese  NERVOUS SYSTEM:  [  ] Alert & Oriented X3, [  ] Nonfocal  [  ] Confusion  [  ] Encephalopathic [  ] Sedated [  ] Unable to assess, [  ] Dementia [  ] Other-  EXTREMITIES: [  ] 2+ Peripheral Pulses, No clubbing, No cyanosis,  [  ] edema B/L lower EXT. [  ] PVD stasis skin changes B/L Lower EXT, [  ] wound  LYMPH: No lymphadenopathy noted  SKIN:  [  ] No rashes or lesions, [  ] Pressure Ulcers, [  ] ecchymosis, [  ] Skin Tears, [  ] Other    DIET: Diet, NPO after Midnight:      NPO Start Date: 06-Jul-2025,   NPO Start Time: 23:59  Except Medications (07-06-25 @ 10:58)  Diet, DASH/TLC:   Sodium & Cholesterol Restricted (07-04-25 @ 09:39)      LABS:                        14.4   8.00  )-----------( 305      ( 06 Jul 2025 04:35 )             43.6     06 Jul 2025 04:35    137    |  107    |  8      ----------------------------<  98     3.5     |  25     |  0.69     Ca    9.0        06 Jul 2025 04:35    TPro  7.0    /  Alb  3.1    /  TBili  1.2    /  DBili  0.4    /  AST  375    /  ALT  885    /  AlkPhos  554    06 Jul 2025 04:35      Urinalysis Basic - ( 06 Jul 2025 04:35 )    Color: x / Appearance: x / SG: x / pH: x  Gluc: 98 mg/dL / Ketone: x  / Bili: x / Urobili: x   Blood: x / Protein: x / Nitrite: x   Leuk Esterase: x / RBC: x / WBC x   Sq Epi: x / Non Sq Epi: x / Bacteria: x                           Anemia Panel:      Thyroid Panel:        Lipase: 172 U/L (07-02-25 @ 17:50)          RADIOLOGY & ADDITIONAL TESTS:  < from: MR MRCP w/wo IV Cont (07.03.25 @ 13:29) >    ACC: [id number] EXAM:  MR MRCP [facility]AW IC   ORDERED BY: ANUP AYALA     PROCEDURE DATE:  07/03/2025          INTERPRETATION:  CLINICAL INFORMATION: Transaminitis. Dilated common bile   duct on CT    COMPARISON: CT abdomen pelvis 7/2/2025; abdominal ultrasound 7/2/2025    CONTRAST/COMPLICATIONS:  IV Contrast: Gadavist  9 cc administered  Oral Contrast: NONE.    PROCEDURE:  MRI of the abdomen was performed.  MRCP was performed.    FINDINGS:  LOWER CHEST: Within normal limits.    LIVER: Within normal limits.  BILE DUCTS: 6 mm distal common bile duct stone (series 5, image 21) with   associated duct dilatation measuring up to 1.3 cm  GALLBLADDER: Multiple layering gallstones. Mildly distended gallbladder   with mildly edematous wall.  SPLEEN: Within normal limits.  PANCREAS: Within normal limits.  ADRENALS: Within normal limits.  KIDNEYS/URETERS: Subcentimeter bilateral renal cysts.    VISUALIZED PORTIONS:  BOWEL: Within normal limits.  PERITONEUM: No ascites.  VESSELS: Within normal limits.  RETROPERITONEUM/LYMPH NODES: No lymphadenopathy.  ABDOMINAL WALL: Within normal limits.  BONES: Within normal limits.    IMPRESSION:  6 mm distal common bile duct calculus with associated biliary duct   dilatation.    Distended gallbladder with stones and mural edema. Findings are   suggestive of acute cholecystitis in the appropriate clinical context.        --- End of Report ---    < end of copied text >        HEALTH ISSUES - PROBLEM Dx:  Transaminitis    Need for prophylactic measure    Anxiety and depression    HLD (hyperlipidemia)    HTN (hypertension)    OAB (overactive bladder)            Consultant(s) Notes Reviewed:  [X  ] YES     Care Discussed with [X] Consultants  [X  ] Patient  [  ] Family [  ] HCP [  ]   [  ] Social Service  [ X ] RN, [  ] Physical Therapy,[  ] Palliative care team  DVT PPX: [  ] Lovenox, [  ] S C Heparin, [  ] Coumadin, [  ] Xarelto, [  ] Eliquis, [  ] Pradaxa, [  ] IV Heparin drip, [  ] SCD [  ] Contraindication 2 to GI Bleed,[  ] Ambulation [  ] Contraindicated 2 to  bleed [  ] Contraindicated 2 to Brain Bleed  Advanced directive: [  ] None, [  ] DNR/DNI Patient is a 54y old  Male who presents with a chief complaint of Transaminitis (06 Jul 2025 10:53)    HPI:  Pt is a 55 yo M PMHx depression (possible Bipolar I?), HLD, HTN (not on medication), HLD, overactive bladder? presenting to ED for abnormal labs. Pt saw his PCP and got lab work, showing elevated LFTs so was instructed to go to ED. Pt has no acute complaints at present, denies abdominal pain, fever, chills, n/v/d/c.    ED Course:   Vitals: BP: 104/71, HR: 61, Temp: 98.1F, RR: 18, SpO2: 94% on RA   Labs:  WBC 5.32, Hg 14.5, K 3.5, Gluc 118, Tbili 2.2, D bili 1.2, Alk phos 515, AST//1132, lipase 172  CT: Gallbladder is moderately distended. No definite gallstones or gallbladder wall thickening. Increased caliber of the extrahepatic CBD measures up to 1.4 cm, with mild intrahepatic biliary prominence.   Received in the ED: 1000cc NS bolus x1 (02 Jul 2025 22:14)    INTERVAL HPI:  07/03: Pt seen and examined at bedside. Pt endorses an abundance if gas but denies any nausea, vomiting, constipation, diarrhea, abdominal pain, fever, or chills. Has no other complaints at this time. RUQ ultrasound unremarkable; MRCP shows evidence of 6mm distal common bile duct calculus with associated biliary duct dilatation- suggestive of acute cholecystitis. Diet advanced from NPO.  MRCP today ,elevated LFT  7/4: Pt seen ,examined  MRCP + for CBD stone, AC ramiro, on IV Rocephin, LFT's improving ,On PO diet  7/5: Pt seen and examined. Overnight with some RUQ pain, resolved with IV Toradol. Denies any nausea, vomiting, diarrhea or constipation. LFTs rise from yesterday. Remains on PO diet, tolerating well.   7/6: Pt seen and examined. Has no acute complaints at this time and denies nausea, vomiting, diarrhea or constipation. LFT's downtrending today but still high. Pt scheduled for lap ramiro  tomorrow, started NPO and continue holding Lovenox. ERCP pending possibly Tuesday.       OVERNIGHT EVENTS:  None    Home Medications:  atorvastatin 10 mg oral tablet: 1 tab(s) orally once a day (at bedtime) (02 Jul 2025 22:27)  oxyBUTYnin 10 mg/24 hr oral tablet, extended release: 1 tab(s) orally once a day (02 Jul 2025 22:27)  sertraline 50 mg oral tablet: 1 tab(s) orally once a day (02 Jul 2025 22:27)      MEDICATIONS  (STANDING):  cefTRIAXone   IVPB 1000 milliGRAM(s) IV Intermittent every 24 hours  oxybutynin XL 10 milliGRAM(s) Oral daily  sodium chloride 0.9%. 1000 milliLiter(s) (75 mL/Hr) IV Continuous <Continuous>    MEDICATIONS  (PRN):  aluminum hydroxide/magnesium hydroxide/simethicone Suspension 30 milliLiter(s) Oral every 4 hours PRN Dyspepsia  melatonin 3 milliGRAM(s) Oral at bedtime PRN Insomnia  ondansetron Injectable 4 milliGRAM(s) IV Push every 8 hours PRN Nausea and/or Vomiting      Allergies    No Known Allergies    Intolerances        Social History:      REVIEW OF SYSTEMS:  CONSTITUTIONAL: No fever, No chills, No fatigue, No myalgia, No Body ache, No Weakness  EYES: No eye pain,  No visual disturbances, No discharge, NO Redness  ENMT:  No ear pain, No nose bleed, No vertigo; No sinus pain, NO throat pain, No Congestion  NECK: No pain, No stiffness  RESPIRATORY: No cough, NO wheezing, No  hemoptysis, NO  shortness of breath  CARDIOVASCULAR: No chest pain, palpitations  GASTROINTESTINAL: No abdominal pain, NO epigastric pain. No nausea, No vomiting; No diarrhea, No constipation. [  ] BM  GENITOURINARY: No dysuria, No frequency, No urgency, No hematuria, NO incontinence  NEUROLOGICAL: No headaches, No dizziness, No numbness, No tingling, No tremors, No weakness  EXT: No Swelling, No Pain, No Edema  SKIN:  [x  ] No itching, burning, rashes, or lesions   MUSCULOSKELETAL: No joint pain ,No Jt swelling; No muscle pain, No back pain, No extremity pain  PSYCHIATRIC: No depression,  No anxiety,  No mood swings ,No difficulty sleeping at night  PAIN SCALE: [x  ] None  [  ] Other-  ROS Unable to obtain due to - [  ] Dementia  [  ] Lethargy [  ] Drowsy [  ] Sedated [  ] non verbal  REST OF REVIEW Of SYSTEM - [x  ] Normal     Vital Signs Last 24 Hrs  T(C): 37.3 (06 Jul 2025 12:06), Max: 37.3 (06 Jul 2025 12:06)  T(F): 99.1 (06 Jul 2025 12:06), Max: 99.1 (06 Jul 2025 12:06)  HR: 68 (06 Jul 2025 12:06) (66 - 75)  BP: 93/59 (06 Jul 2025 12:06) (93/59 - 118/80)  BP(mean): --  RR: 18 (06 Jul 2025 12:06) (18 - 18)  SpO2: 94% (06 Jul 2025 12:06) (94% - 95%)    Parameters below as of 06 Jul 2025 12:06  Patient On (Oxygen Delivery Method): room air      Finger Stick        07-05 @ 07:01  -  07-06 @ 07:00  --------------------------------------------------------  IN: 1890 mL / OUT: 0 mL / NET: 1890 mL      PHYSICAL EXAM:  GENERAL:  [ X ] NAD , [  X] well appearing, [  ] Agitated, [  ] Drowsy,  [  ] Lethargy, [  ] confused   HEAD:  [ X ] Normal, [  ] Other  EYES:  [  X] EOMI, [ X ] PERRLA, [X  ] conjunctiva and sclera clear normal, [  ] Other,  [  ] Pallor,[  ] Discharge  ENMT:  [ X ] Normal, [  X] Moist mucous membranes, [X  ] Good dentition, [ x ] No Thrush  NECK:  [ X ] Supple, [ X ] No JVD, [ X ] Normal thyroid, [  ] Lymphadenopathy [  ] Other  CHEST/LUNG:  [ X ] Clear to auscultation bilaterally, [ X ] Breath Sounds equal B/L / Decrease, [  ] poor effort  [ x ] No rales, [ x ] No rhonchi  [ x ]  No wheezing,   HEART:  [X  ] Regular rate and rhythm, [  ] tachycardia, [  ] Bradycardia,  [  ] irregular  [X  ] No murmurs, No rubs, No gallops, [  ] PPM in place (Mfr:  )  ABDOMEN:  [ X ] Soft, [X  ] Nontender, [X  ] Nondistended, [X  ] No mass, [X  ] Bowel sounds present, [ x ] obese  NERVOUS SYSTEM:  [X ] Alert & Oriented X3, [X  ] Nonfocal  [  ] Confusion  [  ] Encephalopathic [  ] Sedated [  ] Unable to assess, [  ] Dementia [  ] Other-  EXTREMITIES: [X  ] 2+ Peripheral Pulses, No clubbing, No cyanosis,  [  ] edema B/L lower EXT. [  ] PVD stasis skin changes B/L Lower EXT, [  ] wound  LYMPH: No lymphadenopathy noted  SKIN:  [ X ] No rashes or lesions, [  ] Pressure Ulcers, [  ] ecchymosis, [  ] Skin Tears, [  ] Other      DIET: Diet, NPO after Midnight:      NPO Start Date: 06-Jul-2025,   NPO Start Time: 23:59  Except Medications (07-06-25 @ 10:58)  Diet, DASH/TLC:   Sodium & Cholesterol Restricted (07-04-25 @ 09:39)      LABS:                        14.4   8.00  )-----------( 305      ( 06 Jul 2025 04:35 )             43.6     06 Jul 2025 04:35    137    |  107    |  8      ----------------------------<  98     3.5     |  25     |  0.69     Ca    9.0        06 Jul 2025 04:35    TPro  7.0    /  Alb  3.1    /  TBili  1.2    /  DBili  0.4    /  AST  375    /  ALT  885    /  AlkPhos  554    06 Jul 2025 04:35      Urinalysis Basic - ( 06 Jul 2025 04:35 )    Color: x / Appearance: x / SG: x / pH: x  Gluc: 98 mg/dL / Ketone: x  / Bili: x / Urobili: x   Blood: x / Protein: x / Nitrite: x   Leuk Esterase: x / RBC: x / WBC x   Sq Epi: x / Non Sq Epi: x / Bacteria: x        Lipase: 172 U/L (07-02-25 @ 17:50)          RADIOLOGY & ADDITIONAL TESTS:      HEALTH ISSUES - PROBLEM Dx:  Transaminitis    Need for prophylactic measure    Anxiety and depression    HLD (hyperlipidemia)    HTN (hypertension)    OAB (overactive bladder)            Consultant(s) Notes Reviewed:  [  x] YES     Care Discussed with [X] Consultants  [ x ] Patient  [  ] Family [  ] HCP [  ]   [  ] Social Service  [ x ] RN, [  ] Physical Therapy,[  ] Palliative care team  DVT PPX: [ x ] Lovenox, [  ] S C Heparin, [  ] Coumadin, [  ] Xarelto, [  ] Eliquis, [  ] Pradaxa, [  ] IV Heparin drip, [  ] SCD [  ] Contraindication 2 to GI Bleed,[  ] Ambulation [  ] Contraindicated 2 to  bleed [  ] Contraindicated 2 to Brain Bleed  Advanced directive: [x  ] None, [  ] DNR/DNI

## 2025-07-06 NOTE — PROGRESS NOTE ADULT - SUBJECTIVE AND OBJECTIVE BOX
SURGERY PA NOTE ON BEHALF OF DR. SIMS:    S: Patient seen and examined at bedside.   No acute events overnight.  Patient denies abdominal pain, nausea, vomiting.  patient is passing flatus had BM.  Tolerating diet.      MEDICATIONS:  aluminum hydroxide/magnesium hydroxide/simethicone Suspension 30 milliLiter(s) Oral every 4 hours PRN  cefTRIAXone   IVPB 1000 milliGRAM(s) IV Intermittent every 24 hours  enoxaparin Injectable 40 milliGRAM(s) SubCutaneous every 24 hours  melatonin 3 milliGRAM(s) Oral at bedtime PRN  ondansetron Injectable 4 milliGRAM(s) IV Push every 8 hours PRN  oxybutynin XL 10 milliGRAM(s) Oral daily  sodium chloride 0.9%. 1000 milliLiter(s) IV Continuous <Continuous>      O:  Vital Signs Last 24 Hrs  T(C): 36.9 (06 Jul 2025 04:41), Max: 36.9 (06 Jul 2025 04:41)  T(F): 98.5 (06 Jul 2025 04:41), Max: 98.5 (06 Jul 2025 04:41)  HR: 75 (06 Jul 2025 04:41) (60 - 75)  BP: 118/80 (06 Jul 2025 04:41) (113/71 - 118/80)  BP(mean): --  RR: 18 (06 Jul 2025 04:41) (18 - 18)  SpO2: 95% (06 Jul 2025 04:41) (92% - 95%)    Parameters below as of 06 Jul 2025 04:41  Patient On (Oxygen Delivery Method): room air        I&O SUMMARY:    07-05-25 @ 07:01  -  07-06-25 @ 07:00  --------------------------------------------------------  IN: 1890 mL / OUT: 0 mL / NET: 1890 mL        PHYSICAL EXAM:  Lungs: CTA bilat without W/R/R  Card: S1S2  Abd: Soft, NT, ND.  +BS x 4.  No rebound/guarding.    Ext: Calves soft, NT, without edema bilat    LABS:                        14.4   8.00  )-----------( 305      ( 06 Jul 2025 04:35 )             43.6     07-06    137  |  107  |  8   ----------------------------<  98  3.5   |  25  |  0.69    Ca    9.0      06 Jul 2025 04:35    TPro  7.0  /  Alb  3.1[L]  /  TBili  1.2  /  DBili  0.4[H]  /  AST  375[H]  /  ALT  885[H]  /  AlkPhos  554[H]  07-06

## 2025-07-06 NOTE — PROGRESS NOTE ADULT - PROBLEM SELECTOR PLAN 2
Chronic  - c/w home zoloft  - Possible Bipolar I disorder, pt hospitalized at [facility] for psychotic episode- discharged on Risperdal 3mg qhs, Depakote DR 500mg BID, Klonopin to 0.5mg qhs. Per pt no longer taking

## 2025-07-06 NOTE — PROGRESS NOTE ADULT - SUBJECTIVE AND OBJECTIVE BOX
[facility]  Yasmany Snell NP  [address] [place] [address]  [phone]      INTERVAL HPI/OVERNIGHT EVENTS:  Pt s/e  Pt feels well without GI complaints    MEDICATIONS  (STANDING):  cefTRIAXone   IVPB 1000 milliGRAM(s) IV Intermittent every 24 hours  enoxaparin Injectable 40 milliGRAM(s) SubCutaneous every 24 hours  oxybutynin XL 10 milliGRAM(s) Oral daily  sodium chloride 0.9%. 1000 milliLiter(s) (75 mL/Hr) IV Continuous <Continuous>    MEDICATIONS  (PRN):  aluminum hydroxide/magnesium hydroxide/simethicone Suspension 30 milliLiter(s) Oral every 4 hours PRN Dyspepsia  melatonin 3 milliGRAM(s) Oral at bedtime PRN Insomnia  ondansetron Injectable 4 milliGRAM(s) IV Push every 8 hours PRN Nausea and/or Vomiting      Allergies  No Known Allergies    PHYSICAL EXAM:   Vital Signs:  Vital Signs Last 24 Hrs  T(C): 36.9 (2025 04:41), Max: 36.9 (2025 04:41)  T(F): 98.5 (2025 04:41), Max: 98.5 (2025 04:41)  HR: 75 (2025 04:41) (60 - 75)  BP: 118/80 (2025 04:41) (113/71 - 118/80)  BP(mean): --  RR: 18 (2025 04:41) (18 - 18)  SpO2: 95% (2025 04:41) (92% - 95%)    Parameters below as of 2025 04:41  Patient On (Oxygen Delivery Method): room air      Daily     Daily Weight in k.8 (2025 04:41)    GENERAL:  Appears stated age  HEENT:  NC/AT  CHEST:  Full & symmetric excursion  HEART:  Regular rhythm  ABDOMEN:  Soft, non-tender, non-distended  EXTEREMITIES:  no cyanosis  SKIN:  No rash  NEURO:  Alert      LABS:                        14.4   8.00  )-----------( 305      ( 2025 04:35 )             43.6     07-    137  |  107  |  8   ----------------------------<  98  3.5   |  25  |  0.69    Ca    9.0      2025 04:35    TPro  7.0  /  Alb  3.1[L]  /  TBili  1.2  /  DBili  0.4[H]  /  AST  375[H]  /  ALT  885[H]  /  AlkPhos  554[H]  07-      Urinalysis Basic - ( 2025 04:35 )    Color: x / Appearance: x / SG: x / pH: x  Gluc: 98 mg/dL / Ketone: x  / Bili: x / Urobili: x   Blood: x / Protein: x / Nitrite: x   Leuk Esterase: x / RBC: x / WBC x   Sq Epi: x / Non Sq Epi: x / Bacteria: x

## 2025-07-06 NOTE — PROGRESS NOTE ADULT - ASSESSMENT
Pt is a 55 yo M PMHx depression , HLD, HTN (not on medication), HLD, overactive bladder?  presenting  to ED for abnormal labs. Admitted for transaminitis.

## 2025-07-07 ENCOUNTER — TRANSCRIPTION ENCOUNTER (OUTPATIENT)
Age: 55
End: 2025-07-07

## 2025-07-07 LAB
ALBUMIN SERPL ELPH-MCNC: 3.2 G/DL — LOW (ref 3.3–5)
ALP SERPL-CCNC: 464 U/L — HIGH (ref 40–120)
ALT FLD-CCNC: 621 U/L — HIGH (ref 12–78)
ANION GAP SERPL CALC-SCNC: 8 MMOL/L — SIGNIFICANT CHANGE UP (ref 5–17)
APTT BLD: 33.1 SEC — SIGNIFICANT CHANGE UP (ref 26.1–36.8)
AST SERPL-CCNC: 169 U/L — HIGH (ref 15–37)
BILIRUB SERPL-MCNC: 1.3 MG/DL — HIGH (ref 0.2–1.2)
BUN SERPL-MCNC: 7 MG/DL — SIGNIFICANT CHANGE UP (ref 7–23)
CALCIUM SERPL-MCNC: 9.4 MG/DL — SIGNIFICANT CHANGE UP (ref 8.5–10.1)
CHLORIDE SERPL-SCNC: 103 MMOL/L — SIGNIFICANT CHANGE UP (ref 96–108)
CO2 SERPL-SCNC: 23 MMOL/L — SIGNIFICANT CHANGE UP (ref 22–31)
CREAT SERPL-MCNC: 0.82 MG/DL — SIGNIFICANT CHANGE UP (ref 0.5–1.3)
EGFR: 105 ML/MIN/1.73M2 — SIGNIFICANT CHANGE UP
EGFR: 105 ML/MIN/1.73M2 — SIGNIFICANT CHANGE UP
GLUCOSE SERPL-MCNC: 99 MG/DL — SIGNIFICANT CHANGE UP (ref 70–99)
HCT VFR BLD CALC: 43.2 % — SIGNIFICANT CHANGE UP (ref 39–50)
HGB BLD-MCNC: 14.3 G/DL — SIGNIFICANT CHANGE UP (ref 13–17)
INR BLD: 1.05 RATIO — SIGNIFICANT CHANGE UP (ref 0.85–1.16)
MCHC RBC-ENTMCNC: 29.7 PG — SIGNIFICANT CHANGE UP (ref 27–34)
MCHC RBC-ENTMCNC: 33.1 G/DL — SIGNIFICANT CHANGE UP (ref 32–36)
MCV RBC AUTO: 89.6 FL — SIGNIFICANT CHANGE UP (ref 80–100)
NRBC # BLD AUTO: 0 K/UL — SIGNIFICANT CHANGE UP (ref 0–0)
NRBC # FLD: 0 K/UL — SIGNIFICANT CHANGE UP (ref 0–0)
NRBC BLD AUTO-RTO: 0 /100 WBCS — SIGNIFICANT CHANGE UP (ref 0–0)
PLATELET # BLD AUTO: 312 K/UL — SIGNIFICANT CHANGE UP (ref 150–400)
PMV BLD: 8.9 FL — SIGNIFICANT CHANGE UP (ref 7–13)
POTASSIUM SERPL-MCNC: 3.6 MMOL/L — SIGNIFICANT CHANGE UP (ref 3.5–5.3)
POTASSIUM SERPL-SCNC: 3.6 MMOL/L — SIGNIFICANT CHANGE UP (ref 3.5–5.3)
PROT SERPL-MCNC: 7.4 G/DL — SIGNIFICANT CHANGE UP (ref 6–8.3)
PROTHROM AB SERPL-ACNC: 12.3 SEC — SIGNIFICANT CHANGE UP (ref 9.9–13.4)
RBC # BLD: 4.82 M/UL — SIGNIFICANT CHANGE UP (ref 4.2–5.8)
RBC # FLD: 15.1 % — HIGH (ref 10.3–14.5)
SODIUM SERPL-SCNC: 134 MMOL/L — LOW (ref 135–145)
WBC # BLD: 9.31 K/UL — SIGNIFICANT CHANGE UP (ref 3.8–10.5)
WBC # FLD AUTO: 9.31 K/UL — SIGNIFICANT CHANGE UP (ref 3.8–10.5)

## 2025-07-07 PROCEDURE — 85730 THROMBOPLASTIN TIME PARTIAL: CPT

## 2025-07-07 PROCEDURE — 86900 BLOOD TYPING SEROLOGIC ABO: CPT

## 2025-07-07 PROCEDURE — 86901 BLOOD TYPING SEROLOGIC RH(D): CPT

## 2025-07-07 PROCEDURE — 80053 COMPREHEN METABOLIC PANEL: CPT

## 2025-07-07 PROCEDURE — 85027 COMPLETE CBC AUTOMATED: CPT

## 2025-07-07 PROCEDURE — 47563 LAPARO CHOLECYSTECTOMY/GRAPH: CPT | Mod: AS

## 2025-07-07 PROCEDURE — 85025 COMPLETE CBC W/AUTO DIFF WBC: CPT

## 2025-07-07 PROCEDURE — 71045 X-RAY EXAM CHEST 1 VIEW: CPT

## 2025-07-07 PROCEDURE — 82248 BILIRUBIN DIRECT: CPT

## 2025-07-07 PROCEDURE — 74183 MRI ABD W/O CNTR FLWD CNTR: CPT

## 2025-07-07 PROCEDURE — 80074 ACUTE HEPATITIS PANEL: CPT

## 2025-07-07 PROCEDURE — 76705 ECHO EXAM OF ABDOMEN: CPT

## 2025-07-07 PROCEDURE — 36415 COLL VENOUS BLD VENIPUNCTURE: CPT

## 2025-07-07 PROCEDURE — 88304 TISSUE EXAM BY PATHOLOGIST: CPT | Mod: 26

## 2025-07-07 PROCEDURE — 85610 PROTHROMBIN TIME: CPT

## 2025-07-07 PROCEDURE — 93005 ELECTROCARDIOGRAM TRACING: CPT

## 2025-07-07 PROCEDURE — 80076 HEPATIC FUNCTION PANEL: CPT

## 2025-07-07 PROCEDURE — 83690 ASSAY OF LIPASE: CPT

## 2025-07-07 PROCEDURE — 86850 RBC ANTIBODY SCREEN: CPT

## 2025-07-07 PROCEDURE — 74177 CT ABD & PELVIS W/CONTRAST: CPT

## 2025-07-07 PROCEDURE — 80048 BASIC METABOLIC PNL TOTAL CA: CPT

## 2025-07-07 PROCEDURE — A9579: CPT

## 2025-07-07 DEVICE — LIGATING CLIPS WECK HEMOLOK POLYMER MEDIUM-LARGE (GREEN) 6: Type: IMPLANTABLE DEVICE | Status: FUNCTIONAL

## 2025-07-07 RX ORDER — ACETAMINOPHEN 500 MG/5ML
1000 LIQUID (ML) ORAL EVERY 6 HOURS
Refills: 0 | Status: DISCONTINUED | OUTPATIENT
Start: 2025-07-07 | End: 2025-07-09

## 2025-07-07 RX ORDER — OXYCODONE HYDROCHLORIDE 30 MG/1
5 TABLET ORAL ONCE
Refills: 0 | Status: DISCONTINUED | OUTPATIENT
Start: 2025-07-07 | End: 2025-07-07

## 2025-07-07 RX ORDER — OXYCODONE HYDROCHLORIDE 30 MG/1
5 TABLET ORAL EVERY 6 HOURS
Refills: 0 | Status: DISCONTINUED | OUTPATIENT
Start: 2025-07-07 | End: 2025-07-09

## 2025-07-07 RX ORDER — SODIUM CHLORIDE 9 G/1000ML
1000 INJECTION, SOLUTION INTRAVENOUS
Refills: 0 | Status: DISCONTINUED | OUTPATIENT
Start: 2025-07-07 | End: 2025-07-07

## 2025-07-07 RX ORDER — ONDANSETRON HCL/PF 4 MG/2 ML
4 VIAL (ML) INJECTION ONCE
Refills: 0 | Status: DISCONTINUED | OUTPATIENT
Start: 2025-07-07 | End: 2025-07-07

## 2025-07-07 RX ORDER — HYDROMORPHONE/SOD CHLOR,ISO/PF 2 MG/10 ML
0.5 SYRINGE (ML) INJECTION
Refills: 0 | Status: DISCONTINUED | OUTPATIENT
Start: 2025-07-07 | End: 2025-07-07

## 2025-07-07 RX ADMIN — Medication 75 MILLILITER(S): at 06:14

## 2025-07-07 RX ADMIN — CEFTRIAXONE 100 MILLIGRAM(S): 500 INJECTION, POWDER, FOR SOLUTION INTRAMUSCULAR; INTRAVENOUS at 17:50

## 2025-07-07 RX ADMIN — OXYBUTYNIN CHLORIDE 10 MILLIGRAM(S): 5 TABLET, FILM COATED, EXTENDED RELEASE ORAL at 14:47

## 2025-07-07 RX ADMIN — Medication 75 MILLILITER(S): at 14:48

## 2025-07-07 RX ADMIN — INDOCYANINE GREEN AND WATER 5 MILLIGRAM(S): KIT at 09:48

## 2025-07-07 NOTE — PROGRESS NOTE ADULT - SUBJECTIVE AND OBJECTIVE BOX
Patient is a 54y old  Male who presents with a chief complaint of Transaminitis (07 Jul 2025 10:00)    HPI:  Pt is a 55 yo M PMHx depression (possible Bipolar I?), HLD, HTN (not on medication), HLD, overactive bladder? presenting to ED for abnormal labs. Pt saw his PCP and got lab work, showing elevated LFTs so was instructed to go to ED. Pt has no acute complaints at present, denies abdominal pain, fever, chills, n/v/d/c.    ED Course:   Vitals: BP: 104/71, HR: 61, Temp: 98.1F, RR: 18, SpO2: 94% on RA   Labs:  WBC 5.32, Hg 14.5, K 3.5, Gluc 118, Tbili 2.2, D bili 1.2, Alk phos 515, AST//1132, lipase 172  CT: Gallbladder is moderately distended. No definite gallstones or gallbladder wall thickening. Increased caliber of the extrahepatic CBD measures up to 1.4 cm, with mild intrahepatic biliary prominence.   Received in the ED: 1000cc NS bolus x1 (02 Jul 2025 22:14)    INTERVAL HPI:  07/03: Pt seen and examined at bedside. Pt endorses an abundance if gas but denies any nausea, vomiting, constipation, diarrhea, abdominal pain, fever, or chills. Has no other complaints at this time. RUQ ultrasound unremarkable; MRCP shows evidence of 6mm distal common bile duct calculus with associated biliary duct dilatation- suggestive of acute cholecystitis. Diet advanced from NPO.  MRCP today ,elevated LFT  7/4: Pt seen ,examined  MRCP + for CBD stone, AC ramiro, on IV Rocephin, LFT's improving ,On PO diet  7/5: Pt seen and examined. Overnight with some RUQ pain, resolved with IV Toradol. Denies any nausea, vomiting, diarrhea or constipation. LFTs rise from yesterday. Remains on PO diet, tolerating well.   7/6: Pt seen and examined. Has no acute complaints at this time and denies nausea, vomiting, diarrhea or constipation. LFT's downtrending today but still high. Pt scheduled for lap ramiro  tomorrow, started NPO and continue holding Lovenox. ERCP pending possibly Tuesday.   7/7: Pt seen and examined. Has no complaints at this time and denies any abdominal pain. Pt going for laparoscopic cholecystectomy today with tentative ERCP tomorrow.      OVERNIGHT EVENTS:  None    Home Medications:  atorvastatin 10 mg oral tablet: 1 tab(s) orally once a day (at bedtime) (02 Jul 2025 22:27)  oxyBUTYnin 10 mg/24 hr oral tablet, extended release: 1 tab(s) orally once a day (02 Jul 2025 22:27)  sertraline 50 mg oral tablet: 1 tab(s) orally once a day (02 Jul 2025 22:27)      MEDICATIONS  (STANDING):  cefTRIAXone   IVPB 1000 milliGRAM(s) IV Intermittent every 24 hours  oxybutynin XL 10 milliGRAM(s) Oral daily  sodium chloride 0.9%. 1000 milliLiter(s) (75 mL/Hr) IV Continuous <Continuous>    MEDICATIONS  (PRN):  aluminum hydroxide/magnesium hydroxide/simethicone Suspension 30 milliLiter(s) Oral every 4 hours PRN Dyspepsia  melatonin 3 milliGRAM(s) Oral at bedtime PRN Insomnia  ondansetron Injectable 4 milliGRAM(s) IV Push every 8 hours PRN Nausea and/or Vomiting      Allergies    No Known Allergies    Intolerances        Social History:      REVIEW OF SYSTEMS:  CONSTITUTIONAL: No fever, No chills, No fatigue, No myalgia, No Body ache, No Weakness  EYES: No eye pain,  No visual disturbances, No discharge, NO Redness  ENMT:  No ear pain, No nose bleed, No vertigo; No sinus pain, NO throat pain, No Congestion  NECK: No pain, No stiffness  RESPIRATORY: No cough, NO wheezing, No  hemoptysis, NO  shortness of breath  CARDIOVASCULAR: No chest pain, palpitations  GASTROINTESTINAL: No abdominal pain, NO epigastric pain. No nausea, No vomiting; No diarrhea, No constipation. [ X ] BM  GENITOURINARY: No dysuria, No frequency, No urgency, No hematuria, NO incontinence  NEUROLOGICAL: No headaches, No dizziness, No numbness, No tingling, No tremors, No weakness  EXT: No Swelling, No Pain, No Edema  SKIN:  [ X ] No itching, burning, rashes, or lesions   MUSCULOSKELETAL: No joint pain ,No Jt swelling; No muscle pain, No back pain, No extremity pain  PSYCHIATRIC: No depression,  No anxiety,  No mood swings ,No difficulty sleeping at night  PAIN SCALE: [X  ] None  [  ] Other-  ROS Unable to obtain due to - [  ] Dementia  [  ] Lethargy [  ] Drowsy [  ] Sedated [  ] non verbal  REST OF REVIEW Of SYSTEM - [ X ] Normal     Vital Signs Last 24 Hrs  T(C): 37.8 (07 Jul 2025 10:32), Max: 37.8 (07 Jul 2025 10:32)  T(F): 100.1 (07 Jul 2025 10:32), Max: 100.1 (07 Jul 2025 10:32)  HR: 85 (07 Jul 2025 10:32) (59 - 96)  BP: 99/69 (07 Jul 2025 10:32) (93/59 - 105/67)  BP(mean): --  RR: 20 (07 Jul 2025 10:32) (18 - 20)  SpO2: 95% (07 Jul 2025 10:32) (90% - 95%)    Parameters below as of 07 Jul 2025 10:32  Patient On (Oxygen Delivery Method): room air      Finger Stick        07-06 @ 07:01  -  07-07 @ 07:00  --------------------------------------------------------  IN: 1745 mL / OUT: 0 mL / NET: 1745 mL    07-07 @ 07:01  -  07-07 @ 10:46  --------------------------------------------------------  IN: 150 mL / OUT: 0 mL / NET: 150 mL        PHYSICAL EXAM:  GENERAL:  [X  ] NAD , [X  ] well appearing, [  ] Agitated, [  ] Drowsy,  [  ] Lethargy, [  ] confused   HEAD:  [ X ] Normal, [  ] Other  EYES:  [ X ] EOMI, [ X ] PERRLA, [ X ] conjunctiva and sclera clear normal, [  ] Other,  [  ] Pallor,[  ] Discharge  ENMT:  [ X ] Normal, [X  ] Moist mucous membranes, [ X ] Good dentition, [ X ] No Thrush  NECK:  [ X ] Supple, [X  ] No JVD, [ X ] Normal thyroid, [  ] Lymphadenopathy [  ] Other  CHEST/LUNG:  [X  ] Clear to auscultation bilaterally, [ X ] Breath Sounds equal B/L / Decrease, [  ] poor effort  [X  ] No rales, [X  ] No rhonchi  [X  ]  No wheezing,   HEART:  [ X ] Regular rate and rhythm, [  ] tachycardia, [  ] Bradycardia,  [  ] irregular  [ X ] No murmurs, No rubs, No gallops, [  ] PPM in place (Mfr:  )  ABDOMEN:  [ X ] Soft, [X  ] Nontender, [X  ] Nondistended, [X  ] No mass, [ X ] Bowel sounds present, [  ] obese  NERVOUS SYSTEM:  [ X ] Alert & Oriented X3, [X  ] Nonfocal  [  ] Confusion  [  ] Encephalopathic [  ] Sedated [  ] Unable to assess, [  ] Dementia [  ] Other-  EXTREMITIES: [X  ] 2+ Peripheral Pulses, No clubbing, No cyanosis,  [  ] edema B/L lower EXT. [  ] PVD stasis skin changes B/L Lower EXT, [  ] wound  LYMPH: No lymphadenopathy noted  SKIN:  [X  ] No rashes or lesions, [  ] Pressure Ulcers, [  ] ecchymosis, [  ] Skin Tears, [  ] Other    DIET: Diet, NPO after Midnight:      NPO Start Date: 07-Jul-2025,   NPO Start Time: 23:59 (07-07-25 @ 09:35)  Diet, NPO after Midnight:      NPO Start Date: 06-Jul-2025,   NPO Start Time: 23:59  Except Medications (07-06-25 @ 10:58)  Diet, DASH/TLC:   Sodium & Cholesterol Restricted (07-04-25 @ 09:39)      LABS:                        14.3   9.31  )-----------( 312      ( 07 Jul 2025 06:05 )             43.2     07 Jul 2025 06:05    134    |  103    |  7      ----------------------------<  99     3.6     |  23     |  0.82     Ca    9.4        07 Jul 2025 06:05    TPro  7.4    /  Alb  3.2    /  TBili  1.3    /  DBili  x      /  AST  169    /  ALT  621    /  AlkPhos  464    07 Jul 2025 06:05    PT/INR - ( 07 Jul 2025 06:05 )   PT: 12.3 sec;   INR: 1.05 ratio         PTT - ( 07 Jul 2025 06:05 )  PTT:33.1 sec  Urinalysis Basic - ( 07 Jul 2025 06:05 )    Color: x / Appearance: x / SG: x / pH: x  Gluc: 99 mg/dL / Ketone: x  / Bili: x / Urobili: x   Blood: x / Protein: x / Nitrite: x   Leuk Esterase: x / RBC: x / WBC x   Sq Epi: x / Non Sq Epi: x / Bacteria: x                           Anemia Panel:      Thyroid Panel:        Lipase: 172 U/L (07-02-25 @ 17:50)          RADIOLOGY & ADDITIONAL TESTS:      HEALTH ISSUES - PROBLEM Dx:  Transaminitis    Need for prophylactic measure    Anxiety and depression    HLD (hyperlipidemia)    HTN (hypertension)    OAB (overactive bladder)            Consultant(s) Notes Reviewed:  [X  ] YES     Care Discussed with [X] Consultants  [ X ] Patient  [  ] Family [  ] HCP [  ]   [  ] Social Service  [  X] RN, [  ] Physical Therapy,[  ] Palliative care team  DVT PPX: [ X ] Lovenox, [  ] S C Heparin, [  ] Coumadin, [  ] Xarelto, [  ] Eliquis, [  ] Pradaxa, [  ] IV Heparin drip, [  ] SCD [  ] Contraindication 2 to GI Bleed,[  ] Ambulation [  ] Contraindicated 2 to  bleed [  ] Contraindicated 2 to Brain Bleed  Advanced directive: [  ] None, [  ] DNR/DNI Patient is a 54y old  Male who presents with a chief complaint of Transaminitis (07 Jul 2025 10:00)    HPI:  Pt is a 53 yo M PMHx depression (possible Bipolar I?), HLD, HTN (not on medication), HLD, overactive bladder? presenting to ED for abnormal labs. Pt saw his PCP and got lab work, showing elevated LFTs so was instructed to go to ED. Pt has no acute complaints at present, denies abdominal pain, fever, chills, n/v/d/c.    ED Course:   Vitals: BP: 104/71, HR: 61, Temp: 98.1F, RR: 18, SpO2: 94% on RA   Labs:  WBC 5.32, Hg 14.5, K 3.5, Gluc 118, Tbili 2.2, D bili 1.2, Alk phos 515, AST//1132, lipase 172  CT: Gallbladder is moderately distended. No definite gallstones or gallbladder wall thickening. Increased caliber of the extrahepatic CBD measures up to 1.4 cm, with mild intrahepatic biliary prominence.   Received in the ED: 1000cc NS bolus x1 (02 Jul 2025 22:14)    INTERVAL HPI:  07/03: Pt seen and examined at bedside. Pt endorses an abundance if gas but denies any nausea, vomiting, constipation, diarrhea, abdominal pain, fever, or chills. Has no other complaints at this time. RUQ ultrasound unremarkable; MRCP shows evidence of 6mm distal common bile duct calculus with associated biliary duct dilatation- suggestive of acute cholecystitis. Diet advanced from NPO.  MRCP today ,elevated LFT  7/4: Pt seen ,examined  MRCP + for CBD stone, AC ramiro, on IV Rocephin, LFT's improving ,On PO diet  7/5: Pt seen and examined. Overnight with some RUQ pain, resolved with IV Toradol. Denies any nausea, vomiting, diarrhea or constipation. LFTs rise from yesterday. Remains on PO diet, tolerating well.   7/6: Pt seen and examined. Has no acute complaints at this time and denies nausea, vomiting, diarrhea or constipation. LFT's downtrending today but still high. Pt scheduled for lap ramiro  tomorrow, started NPO and continue holding Lovenox. ERCP pending possibly Tuesday.   7/7: Pt seen and examined. Has no complaints at this time and denies any abdominal pain. Pt going for laparoscopic cholecystectomy today with tentative ERCP tomorrow. NPO after midnight      OVERNIGHT EVENTS:  None    Home Medications:  atorvastatin 10 mg oral tablet: 1 tab(s) orally once a day (at bedtime) (02 Jul 2025 22:27)  oxyBUTYnin 10 mg/24 hr oral tablet, extended release: 1 tab(s) orally once a day (02 Jul 2025 22:27)  sertraline 50 mg oral tablet: 1 tab(s) orally once a day (02 Jul 2025 22:27)      MEDICATIONS  (STANDING):  cefTRIAXone   IVPB 1000 milliGRAM(s) IV Intermittent every 24 hours  oxybutynin XL 10 milliGRAM(s) Oral daily  sodium chloride 0.9%. 1000 milliLiter(s) (75 mL/Hr) IV Continuous <Continuous>    MEDICATIONS  (PRN):  aluminum hydroxide/magnesium hydroxide/simethicone Suspension 30 milliLiter(s) Oral every 4 hours PRN Dyspepsia  melatonin 3 milliGRAM(s) Oral at bedtime PRN Insomnia  ondansetron Injectable 4 milliGRAM(s) IV Push every 8 hours PRN Nausea and/or Vomiting      Allergies    No Known Allergies    Intolerances        Social History:      REVIEW OF SYSTEMS:  CONSTITUTIONAL: No fever, No chills, No fatigue, No myalgia, No Body ache, No Weakness  EYES: No eye pain,  No visual disturbances, No discharge, NO Redness  ENMT:  No ear pain, No nose bleed, No vertigo; No sinus pain, NO throat pain, No Congestion  NECK: No pain, No stiffness  RESPIRATORY: No cough, NO wheezing, No  hemoptysis, NO  shortness of breath  CARDIOVASCULAR: No chest pain, palpitations  GASTROINTESTINAL: No abdominal pain, NO epigastric pain. No nausea, No vomiting; No diarrhea, No constipation. [ X ] BM  GENITOURINARY: No dysuria, No frequency, No urgency, No hematuria, NO incontinence  NEUROLOGICAL: No headaches, No dizziness, No numbness, No tingling, No tremors, No weakness  EXT: No Swelling, No Pain, No Edema  SKIN:  [ X ] No itching, burning, rashes, or lesions   MUSCULOSKELETAL: No joint pain ,No Jt swelling; No muscle pain, No back pain, No extremity pain  PSYCHIATRIC: No depression,  No anxiety,  No mood swings ,No difficulty sleeping at night  PAIN SCALE: [X  ] None  [  ] Other-  ROS Unable to obtain due to - [  ] Dementia  [  ] Lethargy [  ] Drowsy [  ] Sedated [  ] non verbal  REST OF REVIEW Of SYSTEM - [ X ] Normal     Vital Signs Last 24 Hrs  T(C): 37.8 (07 Jul 2025 10:32), Max: 37.8 (07 Jul 2025 10:32)  T(F): 100.1 (07 Jul 2025 10:32), Max: 100.1 (07 Jul 2025 10:32)  HR: 85 (07 Jul 2025 10:32) (59 - 96)  BP: 99/69 (07 Jul 2025 10:32) (93/59 - 105/67)  BP(mean): --  RR: 20 (07 Jul 2025 10:32) (18 - 20)  SpO2: 95% (07 Jul 2025 10:32) (90% - 95%)    Parameters below as of 07 Jul 2025 10:32  Patient On (Oxygen Delivery Method): room air      Finger Stick        07-06 @ 07:01 - 07-07 @ 07:00  --------------------------------------------------------  IN: 1745 mL / OUT: 0 mL / NET: 1745 mL    07-07 @ 07:01 - 07-07 @ 10:46  --------------------------------------------------------  IN: 150 mL / OUT: 0 mL / NET: 150 mL        PHYSICAL EXAM:  GENERAL:  [X  ] NAD , [X  ] well appearing, [  ] Agitated, [  ] Drowsy,  [  ] Lethargy, [  ] confused   HEAD:  [ X ] Normal, [  ] Other  EYES:  [ X ] EOMI, [ X ] PERRLA, [ X ] conjunctiva and sclera clear normal, [  ] Other,  [  ] Pallor,[  ] Discharge  ENMT:  [ X ] Normal, [X  ] Moist mucous membranes, [ X ] Good dentition, [ X ] No Thrush  NECK:  [ X ] Supple, [X  ] No JVD, [ X ] Normal thyroid, [  ] Lymphadenopathy [  ] Other  CHEST/LUNG:  [X  ] Clear to auscultation bilaterally, [ X ] Breath Sounds equal B/L / Decrease, [  ] poor effort  [X  ] No rales, [X  ] No rhonchi  [X  ]  No wheezing,   HEART:  [ X ] Regular rate and rhythm, [  ] tachycardia, [  ] Bradycardia,  [  ] irregular  [ X ] No murmurs, No rubs, No gallops, [  ] PPM in place (Mfr:  )  ABDOMEN:  [ X ] Soft, [X  ] Nontender, [X  ] Nondistended, [X  ] No mass, [ X ] Bowel sounds present, [  ] obese  NERVOUS SYSTEM:  [ X ] Alert & Oriented X3, [X  ] Nonfocal  [  ] Confusion  [  ] Encephalopathic [  ] Sedated [  ] Unable to assess, [  ] Dementia [  ] Other-  EXTREMITIES: [X  ] 2+ Peripheral Pulses, No clubbing, No cyanosis,  [  ] edema B/L lower EXT. [  ] PVD stasis skin changes B/L Lower EXT, [  ] wound  LYMPH: No lymphadenopathy noted  SKIN:  [X  ] No rashes or lesions, [  ] Pressure Ulcers, [  ] ecchymosis, [  ] Skin Tears, [  ] Other    DIET: Diet, NPO after Midnight:      NPO Start Date: 07-Jul-2025,   NPO Start Time: 23:59 (07-07-25 @ 09:35)  Diet, NPO after Midnight:      NPO Start Date: 06-Jul-2025,   NPO Start Time: 23:59  Except Medications (07-06-25 @ 10:58)  Diet, DASH/TLC:   Sodium & Cholesterol Restricted (07-04-25 @ 09:39)      LABS:                        14.3   9.31  )-----------( 312      ( 07 Jul 2025 06:05 )             43.2     07 Jul 2025 06:05    134    |  103    |  7      ----------------------------<  99     3.6     |  23     |  0.82     Ca    9.4        07 Jul 2025 06:05    TPro  7.4    /  Alb  3.2    /  TBili  1.3    /  DBili  x      /  AST  169    /  ALT  621    /  AlkPhos  464    07 Jul 2025 06:05    PT/INR - ( 07 Jul 2025 06:05 )   PT: 12.3 sec;   INR: 1.05 ratio         PTT - ( 07 Jul 2025 06:05 )  PTT:33.1 sec  Urinalysis Basic - ( 07 Jul 2025 06:05 )    Color: x / Appearance: x / SG: x / pH: x  Gluc: 99 mg/dL / Ketone: x  / Bili: x / Urobili: x   Blood: x / Protein: x / Nitrite: x   Leuk Esterase: x / RBC: x / WBC x   Sq Epi: x / Non Sq Epi: x / Bacteria: x          Lipase: 172 U/L (07-02-25 @ 17:50)          RADIOLOGY & ADDITIONAL TESTS:  < from: Xray Chest 1 View- PORTABLE-Urgent (Xray Chest 1 View- PORTABLE-Urgent .) (07.06.25 @ 16:41) >  CLINICAL INFORMATION: Preop    FINDINGS:  Hypoventilatory film.  The heart is not well assessed on an AP film.  The lungs are clear.  There are no pleural effusions.  There is no pneumothorax.    IMPRESSION:    No radiographic evidence of acute cardiopulmonary disease    < end of copied text >      HEALTH ISSUES - PROBLEM Dx:  Transaminitis    Need for prophylactic measure    Anxiety and depression    HLD (hyperlipidemia)    HTN (hypertension)    OAB (overactive bladder)            Consultant(s) Notes Reviewed:  [X  ] YES     Care Discussed with [X] Consultants  [ X ] Patient  [  ] Family [  ] HCP [  ]   [  ] Social Service  [  X] RN, [  ] Physical Therapy,[  ] Palliative care team  DVT PPX: [ X ] Lovenox, [  ] S C Heparin, [  ] Coumadin, [  ] Xarelto, [  ] Eliquis, [  ] Pradaxa, [  ] IV Heparin drip, [  ] SCD [  ] Contraindication 2 to GI Bleed,[  ] Ambulation [  ] Contraindicated 2 to  bleed [  ] Contraindicated 2 to Brain Bleed  Advanced directive: [  ] None, [  ] DNR/DNI

## 2025-07-07 NOTE — PROGRESS NOTE ADULT - PROBLEM SELECTOR PLAN 1
transaminitis-elevated LFT's -CBD stone obstruction  - Afebrile, no leukocytosis.  - CT A/P with IV contrast: gallbladder is moderately distended. No definite gallstones or gallbladder wall thickening. Increased caliber of the extrahepatic CBD measures up to 1.4 cm, with mild intrahepatic biliary prominence.   - RUQ US WNL  - MRCP 6 mm distal common bile duct calculus with associated biliary duct dilatation AC ramiro   - advanced diet   - AM  LFTs  - Rocephin 1g QD   - Hold home statin  - Avoid hepatotoxic meds  - GI (Dr. Solorio   d/w Ok for Po diet , ERCP on Tuesday ,On  IV Abx as AC Ramiro   - Surgery (Dr. Johnson)  OR on 7/6 for Lap Ramiro

## 2025-07-07 NOTE — PROGRESS NOTE ADULT - SUBJECTIVE AND OBJECTIVE BOX
pt seen  doing well  ICU Vital Signs Last 24 Hrs  T(C): 36.9 (07 Jul 2025 09:31), Max: 37.3 (06 Jul 2025 12:06)  T(F): 98.5 (07 Jul 2025 09:31), Max: 99.1 (06 Jul 2025 12:06)  HR: 79 (07 Jul 2025 09:31) (59 - 96)  BP: 101/67 (07 Jul 2025 09:31) (93/59 - 105/67)  BP(mean): --  ABP: --  ABP(mean): --  RR: 18 (07 Jul 2025 09:31) (18 - 18)  SpO2: 90% (07 Jul 2025 09:31) (90% - 95%)    O2 Parameters below as of 07 Jul 2025 09:31  Patient On (Oxygen Delivery Method): room air        NAD  soft NT/ND                            14.3   9.31  )-----------( 312      ( 07 Jul 2025 06:05 )             43.2       07-07    134[L]  |  103  |  7   ----------------------------<  99  3.6   |  23  |  0.82    Ca    9.4      07 Jul 2025 06:05    TPro  7.4  /  Alb  3.2[L]  /  TBili  1.3[H]  /  DBili  x   /  AST  169[H]  /  ALT  621[H]  /  AlkPhos  464[H]  07-07

## 2025-07-07 NOTE — CHART NOTE - NSCHARTNOTEFT_GEN_A_CORE
Post Operative Note  Patient: JEANE HASTINGS 54y (1970) Male   MRN: [id number]  Location: [address]  Visit: 07-02-25 Inpatient  Date: 07-07-25 @ 14:59    Procedure: S/P robot assisted laparoscopic cholecystectomy    Subjective:   Patient seen and examined at bedside, reports tolerating clear diet, ambulating, voiding.  Minimal abdominal pain  Patient denies dizziness, chest pain, sob, nausea, vomiting, abdominal pain, or urinary complaints.    Objective:  Vitals: T(F): 97.9 (07-07-25 @ 13:02), Max: 100.1 (07-07-25 @ 10:32)  HR: 71 (07-07-25 @ 13:02)  BP: 104/70 (07-07-25 @ 13:02) (95/81 - 105/67)  RR: 16 (07-07-25 @ 13:02)  SpO2: 97% (07-07-25 @ 13:02)  Vent Settings:     In:   07-06-25 @ 07:01  -  07-07-25 @ 07:00  --------------------------------------------------------  IN: 1745 mL    07-07-25 @ 07:01  -  07-07-25 @ 14:59  --------------------------------------------------------  IN: 150 mL      IV Fluids: sodium chloride 0.9%. 1000 milliLiter(s) (75 mL/Hr) IV Continuous <Continuous>      Out:   07-06-25 @ 07:01  -  07-07-25 @ 07:00  --------------------------------------------------------  OUT: 0 mL    07-07-25 @ 07:01  -  07-07-25 @ 14:59  --------------------------------------------------------  OUT: 0 mL      EBL:     Voided Urine:   07-06-25 @ 07:01  -  07-07-25 @ 07:00  --------------------------------------------------------  OUT: 0 mL    07-07-25 @ 07:01  -  07-07-25 @ 14:59  --------------------------------------------------------  OUT: 0 mL      Ochoa Catheter: yes no   Drains:   YOSHI:    ,   Chest Tube:      NG Tube:       GENERAL:  Well-nourished, well-developed Male lying comfortably in bed in [facility].  HEENT:  NC/AT. Sclera white. Mucous membranes moist.  CARDIO:  Regular rate and rhythm.  No murmur, gallop or rub appreciated.  RESPIRATORY:  Nonlabored breathing, no accessory muscle use. Lungs clear to auscultation bilaterally.  No wheezing, rales or rhonchi appreciated.  ABDOMEN:  Soft, nondistended, nontender. BS appreciated on auscultation.  No rebound tenderness or guarding.  EXTREMITIES: No calf tenderness bilaterally.  SKIN:  No jaundice, pallor, or cyanosis  NEURO:  A&O x 3  Surgical dressings clean, dry, intact.    Medications: [Standing]  acetaminophen     Tablet .. 1000 milliGRAM(s) Oral every 6 hours PRN  aluminum hydroxide/magnesium hydroxide/simethicone Suspension 30 milliLiter(s) Oral every 4 hours PRN  cefTRIAXone   IVPB 1000 milliGRAM(s) IV Intermittent every 24 hours  melatonin 3 milliGRAM(s) Oral at bedtime PRN  ondansetron Injectable 4 milliGRAM(s) IV Push every 8 hours PRN  oxybutynin XL 10 milliGRAM(s) Oral daily  oxyCODONE    IR 5 milliGRAM(s) Oral every 6 hours PRN  sodium chloride 0.9%. 1000 milliLiter(s) IV Continuous <Continuous>    Medications: [PRN]  acetaminophen     Tablet .. 1000 milliGRAM(s) Oral every 6 hours PRN  aluminum hydroxide/magnesium hydroxide/simethicone Suspension 30 milliLiter(s) Oral every 4 hours PRN  cefTRIAXone   IVPB 1000 milliGRAM(s) IV Intermittent every 24 hours  melatonin 3 milliGRAM(s) Oral at bedtime PRN  ondansetron Injectable 4 milliGRAM(s) IV Push every 8 hours PRN  oxybutynin XL 10 milliGRAM(s) Oral daily  oxyCODONE    IR 5 milliGRAM(s) Oral every 6 hours PRN  sodium chloride 0.9%. 1000 milliLiter(s) IV Continuous <Continuous>    Labs:                        14.3   9.31  )-----------( 312      ( 07 Jul 2025 06:05 )             43.2     07-07    134[L]  |  103  |  7   ----------------------------<  99  3.6   |  23  |  0.82    Ca    9.4      07 Jul 2025 06:05    TPro  7.4  /  Alb  3.2[L]  /  TBili  1.3[H]  /  DBili  x   /  AST  169[H]  /  ALT  621[H]  /  AlkPhos  464[H]  07-07    PT/INR - ( 07 Jul 2025 06:05 )   PT: 12.3 sec;   INR: 1.05 ratio         PTT - ( 07 Jul 2025 06:05 )  PTT:33.1 sec      Imaging:  No post-op imaging studies    Assessment:  54yMale patient S/P robot assisted laparoscopic cholecystectomy for choledocholithiasis    Plan:  - Continue diet; advance to Low fat  - NPOm for tentative ERCP tomorrow   - Pain control  - Zofran PRN  - Incentive spirometry  - Encourage ambulation  - SCDs  - Encouraged IS use/OOB/Ambulation  - Follow up AM labs  - Will continue to monitor

## 2025-07-07 NOTE — BRIEF OPERATIVE NOTE - NSICDXBRIEFPROCEDURE_GEN_ALL_CORE_FT
PROCEDURES:  Robot-assisted cholecystectomy with cholangiography 07-Jul-2025 11:50:35  Girma Johnson

## 2025-07-07 NOTE — PROGRESS NOTE ADULT - SUBJECTIVE AND OBJECTIVE BOX
[facility]  Yasmany Snell NP  [address] [place] [address]  [phone]      INTERVAL HPI/OVERNIGHT EVENTS:  Pt s/e  Planned for robotic ccy today  ERCP tomorrow    MEDICATIONS  (STANDING):  cefTRIAXone   IVPB 1000 milliGRAM(s) IV Intermittent every 24 hours  oxybutynin XL 10 milliGRAM(s) Oral daily  sodium chloride 0.9%. 1000 milliLiter(s) (75 mL/Hr) IV Continuous <Continuous>    MEDICATIONS  (PRN):  aluminum hydroxide/magnesium hydroxide/simethicone Suspension 30 milliLiter(s) Oral every 4 hours PRN Dyspepsia  melatonin 3 milliGRAM(s) Oral at bedtime PRN Insomnia  ondansetron Injectable 4 milliGRAM(s) IV Push every 8 hours PRN Nausea and/or Vomiting      Allergies    No Known Allergies      PHYSICAL EXAM:   Vital Signs:  Vital Signs Last 24 Hrs  T(C): 37.8 (2025 10:32), Max: 37.8 (2025 10:32)  T(F): 100.1 (2025 10:32), Max: 100.1 (2025 10:32)  HR: 85 (2025 10:32) (59 - 96)  BP: 99/69 (2025 10:32) (93/59 - 105/67)  BP(mean): --  RR: 20 (2025 10:32) (18 - 20)  SpO2: 95% (2025 10:32) (90% - 95%)    Parameters below as of 2025 10:32  Patient On (Oxygen Delivery Method): room air      Daily     Daily Weight in k.8 (2025 05:26)    GENERAL:  Appears stated age  HEENT:  NC/AT  CHEST:  Full & symmetric excursion  HEART:  Regular rhythm  ABDOMEN:  Soft, non-tender, non-distended  EXTEREMITIES:  no cyanosis  SKIN:  No rash  NEURO:  Alert      LABS:                        14.3   9.31  )-----------( 312      ( 2025 06:05 )             43.2         134[L]  |  103  |  7   ----------------------------<  99  3.6   |  23  |  0.82    Ca    9.4      2025 06:05    TPro  7.4  /  Alb  3.2[L]  /  TBili  1.3[H]  /  DBili  x   /  AST  169[H]  /  ALT  621[H]  /  AlkPhos  464[H]  07-    PT/INR - ( 2025 06:05 )   PT: 12.3 sec;   INR: 1.05 ratio         PTT - ( 2025 06:05 )  PTT:33.1 sec  Urinalysis Basic - ( 2025 06:05 )    Color: x / Appearance: x / SG: x / pH: x  Gluc: 99 mg/dL / Ketone: x  / Bili: x / Urobili: x   Blood: x / Protein: x / Nitrite: x   Leuk Esterase: x / RBC: x / WBC x   Sq Epi: x / Non Sq Epi: x / Bacteria: x

## 2025-07-07 NOTE — PROGRESS NOTE ADULT - ASSESSMENT
elevated lfts    7/3 MRCP: 6 mm distal common bile duct calculus with associated biliary duct dilatation.    Plan:  - CT noted with dilated ducts however u/s shows non-dilated duct  - MRCP noted, positive for CBD stone  - ERCP scheduled for tomorrow (Tuesday 7/8)  - d/w surgery team, cholecystectomy today  - Monitor WBC and LFTs, bilirubin  - Will need NPO after midnight tonight  - Discussed with primary attending  - D/w pt

## 2025-07-07 NOTE — PROGRESS NOTE ADULT - PROBLEM SELECTOR PLAN 2
Chronic  - c/w home zoloft  - Possible Bipolar I disorder, pt hospitalized at [facility] for psychotic episode- discharged on Risperdal 3mg qhs, Depakote DR 500mg BID, Klonopin to 0.5mg qhs. Per pt no longer taking

## 2025-07-07 NOTE — CASE MANAGEMENT PROGRESS NOTE - NSCMPROGRESSNOTE_GEN_ALL_CORE
Patient discussed during round today. Patient is to OR for cholecystectomy with cholangiography. Patient remains on Rocephin. CM will continue to follow.

## 2025-07-07 NOTE — SOCIAL WORK PROGRESS NOTE - NSSWPROGRESSNOTE_GEN_ALL_CORE
Per interdisciplinary rounds, pt will have [facility] procedure today for Gallbladder stones. SW will remain available if any needs arise.

## 2025-07-08 ENCOUNTER — TRANSCRIPTION ENCOUNTER (OUTPATIENT)
Age: 55
End: 2025-07-08

## 2025-07-08 DIAGNOSIS — K81.0 ACUTE CHOLECYSTITIS: ICD-10-CM

## 2025-07-08 LAB
ABO RH CONFIRMATION: SIGNIFICANT CHANGE UP
ALBUMIN SERPL ELPH-MCNC: 2.8 G/DL — LOW (ref 3.3–5)
ALP SERPL-CCNC: 374 U/L — HIGH (ref 40–120)
ALT FLD-CCNC: 438 U/L — HIGH (ref 12–78)
ANION GAP SERPL CALC-SCNC: 6 MMOL/L — SIGNIFICANT CHANGE UP (ref 5–17)
APTT BLD: 28.6 SEC — SIGNIFICANT CHANGE UP (ref 26.1–36.8)
AST SERPL-CCNC: 115 U/L — HIGH (ref 15–37)
BASOPHILS # BLD AUTO: 0.01 K/UL — SIGNIFICANT CHANGE UP (ref 0–0.2)
BASOPHILS NFR BLD AUTO: 0.1 % — SIGNIFICANT CHANGE UP (ref 0–2)
BILIRUB SERPL-MCNC: 0.9 MG/DL — SIGNIFICANT CHANGE UP (ref 0.2–1.2)
BUN SERPL-MCNC: 8 MG/DL — SIGNIFICANT CHANGE UP (ref 7–23)
CALCIUM SERPL-MCNC: 8.9 MG/DL — SIGNIFICANT CHANGE UP (ref 8.5–10.1)
CHLORIDE SERPL-SCNC: 106 MMOL/L — SIGNIFICANT CHANGE UP (ref 96–108)
CO2 SERPL-SCNC: 25 MMOL/L — SIGNIFICANT CHANGE UP (ref 22–31)
CREAT SERPL-MCNC: 0.66 MG/DL — SIGNIFICANT CHANGE UP (ref 0.5–1.3)
EGFR: 112 ML/MIN/1.73M2 — SIGNIFICANT CHANGE UP
EGFR: 112 ML/MIN/1.73M2 — SIGNIFICANT CHANGE UP
EOSINOPHIL # BLD AUTO: 0.04 K/UL — SIGNIFICANT CHANGE UP (ref 0–0.5)
EOSINOPHIL NFR BLD AUTO: 0.3 % — SIGNIFICANT CHANGE UP (ref 0–6)
GLUCOSE SERPL-MCNC: 110 MG/DL — HIGH (ref 70–99)
HCT VFR BLD CALC: 38.9 % — LOW (ref 39–50)
HGB BLD-MCNC: 13 G/DL — SIGNIFICANT CHANGE UP (ref 13–17)
IMM GRANULOCYTES # BLD AUTO: 0.05 K/UL — SIGNIFICANT CHANGE UP (ref 0–0.07)
IMM GRANULOCYTES NFR BLD AUTO: 0.4 % — SIGNIFICANT CHANGE UP (ref 0–0.9)
INR BLD: 1.08 RATIO — SIGNIFICANT CHANGE UP (ref 0.85–1.16)
LYMPHOCYTES # BLD AUTO: 1.31 K/UL — SIGNIFICANT CHANGE UP (ref 1–3.3)
LYMPHOCYTES NFR BLD AUTO: 9.4 % — LOW (ref 13–44)
MCHC RBC-ENTMCNC: 30 PG — SIGNIFICANT CHANGE UP (ref 27–34)
MCHC RBC-ENTMCNC: 33.4 G/DL — SIGNIFICANT CHANGE UP (ref 32–36)
MCV RBC AUTO: 89.8 FL — SIGNIFICANT CHANGE UP (ref 80–100)
MONOCYTES # BLD AUTO: 0.99 K/UL — HIGH (ref 0–0.9)
MONOCYTES NFR BLD AUTO: 7.1 % — SIGNIFICANT CHANGE UP (ref 2–14)
NEUTROPHILS # BLD AUTO: 11.49 K/UL — HIGH (ref 1.8–7.4)
NEUTROPHILS NFR BLD AUTO: 82.7 % — HIGH (ref 43–77)
NRBC # BLD AUTO: 0 K/UL — SIGNIFICANT CHANGE UP (ref 0–0)
NRBC # FLD: 0 K/UL — SIGNIFICANT CHANGE UP (ref 0–0)
NRBC BLD AUTO-RTO: 0 /100 WBCS — SIGNIFICANT CHANGE UP (ref 0–0)
PLATELET # BLD AUTO: 296 K/UL — SIGNIFICANT CHANGE UP (ref 150–400)
PMV BLD: 9 FL — SIGNIFICANT CHANGE UP (ref 7–13)
POTASSIUM SERPL-MCNC: 3.6 MMOL/L — SIGNIFICANT CHANGE UP (ref 3.5–5.3)
POTASSIUM SERPL-SCNC: 3.6 MMOL/L — SIGNIFICANT CHANGE UP (ref 3.5–5.3)
PROT SERPL-MCNC: 7 G/DL — SIGNIFICANT CHANGE UP (ref 6–8.3)
PROTHROM AB SERPL-ACNC: 12.7 SEC — SIGNIFICANT CHANGE UP (ref 9.9–13.4)
RBC # BLD: 4.33 M/UL — SIGNIFICANT CHANGE UP (ref 4.2–5.8)
RBC # FLD: 14.6 % — HIGH (ref 10.3–14.5)
SODIUM SERPL-SCNC: 137 MMOL/L — SIGNIFICANT CHANGE UP (ref 135–145)
SURGICAL PATHOLOGY STUDY: SIGNIFICANT CHANGE UP
WBC # BLD: 13.89 K/UL — HIGH (ref 3.8–10.5)
WBC # FLD AUTO: 13.89 K/UL — HIGH (ref 3.8–10.5)

## 2025-07-08 DEVICE — SYS NEXPOWDER HEMOSTATIC: Type: IMPLANTABLE DEVICE | Status: FUNCTIONAL

## 2025-07-08 DEVICE — HYDRATOME 44: Type: IMPLANTABLE DEVICE | Status: FUNCTIONAL

## 2025-07-08 DEVICE — IMPLANTABLE DEVICE: Type: IMPLANTABLE DEVICE | Status: FUNCTIONAL

## 2025-07-08 DEVICE — GWIRE VISIGLIDE ST TIP 270CM: Type: IMPLANTABLE DEVICE | Status: FUNCTIONAL

## 2025-07-08 RX ORDER — SODIUM CHLORIDE 9 G/1000ML
1000 INJECTION, SOLUTION INTRAVENOUS
Refills: 0 | Status: DISCONTINUED | OUTPATIENT
Start: 2025-07-08 | End: 2025-07-08

## 2025-07-08 RX ORDER — ONDANSETRON HCL/PF 4 MG/2 ML
4 VIAL (ML) INJECTION ONCE
Refills: 0 | Status: DISCONTINUED | OUTPATIENT
Start: 2025-07-08 | End: 2025-07-08

## 2025-07-08 RX ORDER — OXYCODONE HYDROCHLORIDE 30 MG/1
5 TABLET ORAL ONCE
Refills: 0 | Status: DISCONTINUED | OUTPATIENT
Start: 2025-07-08 | End: 2025-07-08

## 2025-07-08 RX ORDER — HYDROMORPHONE/SOD CHLOR,ISO/PF 2 MG/10 ML
0.5 SYRINGE (ML) INJECTION
Refills: 0 | Status: DISCONTINUED | OUTPATIENT
Start: 2025-07-08 | End: 2025-07-08

## 2025-07-08 RX ADMIN — CEFTRIAXONE 100 MILLIGRAM(S): 500 INJECTION, POWDER, FOR SOLUTION INTRAMUSCULAR; INTRAVENOUS at 17:37

## 2025-07-08 NOTE — PROGRESS NOTE ADULT - ASSESSMENT
ASSESSMENT & PLAN  54y Male POD#0 s/p robo-assisted cholecystectomy. Recovering appropriately. Without pain this AM. Pending AM labs.  - pt planned for ERCP this AM with GI  - NPO status confirmed   - Is & Os  - DVT prophylaxis held for ERCP  - OOB, pain control  - Incentive spirometry  - Follow up AM labs  - care per primary team   - to be discussed with Dr. Johnson ASSESSMENT & PLAN  54y Male POD#1 s/p robo-assisted cholecystectomy. Recovering appropriately. Without pain this AM. Pending AM labs.  - pt planned for ERCP this AM with GI  - NPO status confirmed   - Is & Os  - DVT prophylaxis held for ERCP  - OOB, pain control  - Incentive spirometry  - Follow up AM labs  - care per primary team   - to be discussed with Dr. Johnson

## 2025-07-08 NOTE — PROGRESS NOTE ADULT - PROBLEM SELECTOR PLAN 5
Possible OAB, sees uro and prescribed oxybutynin   - c/w home oxybutynin Chronic  - Not currently on home medications

## 2025-07-08 NOTE — PROGRESS NOTE ADULT - SUBJECTIVE AND OBJECTIVE BOX
POD#1 s/p robo-assisted cholecystectomy    SUBJECTIVE:  Patient seen and examined at bedside.  No overnight events.  Patient reports no new complaints at this time, reports adequate pain control.  Admits to flatus, no BM.  Voiding, ambulating. NPO for ERCP.  Patient denies any fever, chills, chest pain, shortness of breath, nausea, vomiting, or urinary complaints.    VITALS  Vital Signs Last 24 Hrs  T(C): 36.8 (08 Jul 2025 04:36), Max: 37.8 (07 Jul 2025 10:32)  T(F): 98.3 (08 Jul 2025 04:36), Max: 100.1 (07 Jul 2025 10:32)  HR: 74 (08 Jul 2025 04:36) (66 - 85)  BP: 95/61 (08 Jul 2025 04:36) (95/61 - 104/70)  BP(mean): --  RR: 17 (08 Jul 2025 04:36) (14 - 20)  SpO2: 95% (08 Jul 2025 04:36) (90% - 98%)    Parameters below as of 08 Jul 2025 04:36  Patient On (Oxygen Delivery Method): room air        PHYSICAL EXAM  General: NAD, resting comfortably in bed  Pulmonary: Nonlabored, no respiratory distress  Cardiovascular: regular rate and rhythm   Abdominal: soft, minimal distention, incisions with dermabond without signs of infection.   Extremities: WWP, no calf tenderness   Neuro: A/O x 3, no focal deficits, normal motor/sensation      INTAKE & OUTPUT  I&O's Summary    06 Jul 2025 07:01  -  07 Jul 2025 07:00  --------------------------------------------------------  IN: 1745 mL / OUT: 0 mL / NET: 1745 mL    07 Jul 2025 07:01  -  08 Jul 2025 05:32  --------------------------------------------------------  IN: 1415 mL / OUT: 300 mL / NET: 1115 mL      I&O's Detail    06 Jul 2025 07:01  -  07 Jul 2025 07:00  --------------------------------------------------------  IN:    IV PiggyBack: 50 mL    Oral Fluid: 720 mL    sodium chloride 0.9%: 975 mL  Total IN: 1745 mL    OUT:  Total OUT: 0 mL    Total NET: 1745 mL      07 Jul 2025 07:01  -  08 Jul 2025 05:32  --------------------------------------------------------  IN:    IV PiggyBack: 50 mL    Oral Fluid: 840 mL    sodium chloride 0.9%: 525 mL  Total IN: 1415 mL    OUT:    Voided (mL): 300 mL  Total OUT: 300 mL    Total NET: 1115 mL          MEDICATIONS  MEDICATIONS  (STANDING):  cefTRIAXone   IVPB 1000 milliGRAM(s) IV Intermittent every 24 hours  oxybutynin XL 10 milliGRAM(s) Oral daily  sodium chloride 0.9%. 1000 milliLiter(s) (75 mL/Hr) IV Continuous <Continuous>    MEDICATIONS  (PRN):  acetaminophen     Tablet .. 1000 milliGRAM(s) Oral every 6 hours PRN Temp greater or equal to 38C (100.4F), Mild Pain (1 - 3)  aluminum hydroxide/magnesium hydroxide/simethicone Suspension 30 milliLiter(s) Oral every 4 hours PRN Dyspepsia  melatonin 3 milliGRAM(s) Oral at bedtime PRN Insomnia  ondansetron Injectable 4 milliGRAM(s) IV Push every 8 hours PRN Nausea and/or Vomiting  oxyCODONE    IR 5 milliGRAM(s) Oral every 6 hours PRN Severe Pain (7 - 10)      LABS:                        14.3   9.31  )-----------( 312      ( 07 Jul 2025 06:05 )             43.2     07-07    134[L]  |  103  |  7   ----------------------------<  99  3.6   |  23  |  0.82    Ca    9.4      07 Jul 2025 06:05    TPro  7.4  /  Alb  3.2[L]  /  TBili  1.3[H]  /  DBili  x   /  AST  169[H]  /  ALT  621[H]  /  AlkPhos  464[H]  07-07    PT/INR - ( 07 Jul 2025 06:05 )   PT: 12.3 sec;   INR: 1.05 ratio         PTT - ( 07 Jul 2025 06:05 )  PTT:33.1 sec      RADIOLOGY & ADDITIONAL STUDIES:  < from: MR MRCP w/wo IV Cont (07.03.25 @ 13:29) >    PROCEDURE DATE:  07/03/2025          INTERPRETATION:  CLINICAL INFORMATION: Transaminitis. Dilated common bile   duct on CT    COMPARISON: CT abdomen pelvis 7/2/2025; abdominal ultrasound 7/2/2025    CONTRAST/COMPLICATIONS:  IV Contrast: Gadavist  9 cc administered  Oral Contrast: NONE.    PROCEDURE:  MRI of the abdomen was performed.  MRCP was performed.    FINDINGS:  LOWER CHEST: Within normal limits.    LIVER: Within normal limits.  BILE DUCTS: 6 mm distal common bile duct stone (series 5, image 21) with   associated duct dilatation measuring up to 1.3 cm  GALLBLADDER: Multiple layering gallstones. Mildly distended gallbladder   with mildly edematous wall.  SPLEEN: Within normal limits.  PANCREAS: Within normal limits.  ADRENALS: Within normal limits.  KIDNEYS/URETERS: Subcentimeter bilateral renal cysts.    VISUALIZED PORTIONS:  BOWEL: Within normal limits.  PERITONEUM: No ascites.  VESSELS: Within normal limits.  RETROPERITONEUM/LYMPH NODES: No lymphadenopathy.  ABDOMINAL WALL: Within normal limits.  BONES: Within normal limits.    IMPRESSION:  6 mm distal common bile duct calculus with associated biliary duct   dilatation.    < end of copied text >

## 2025-07-08 NOTE — PROGRESS NOTE ADULT - SUBJECTIVE AND OBJECTIVE BOX
Patient is a 54y old  Male who presents with a chief complaint of Transaminitis (08 Jul 2025 05:31)    HPI:  Pt is a 53 yo M PMHx depression (possible Bipolar I?), HLD, HTN (not on medication), HLD, overactive bladder? presenting to ED for abnormal labs. Pt saw his PCP and got lab work, showing elevated LFTs so was instructed to go to ED. Pt has no acute complaints at present, denies abdominal pain, fever, chills, n/v/d/c.    ED Course:   Vitals: BP: 104/71, HR: 61, Temp: 98.1F, RR: 18, SpO2: 94% on RA   Labs:  WBC 5.32, Hg 14.5, K 3.5, Gluc 118, Tbili 2.2, D bili 1.2, Alk phos 515, AST//1132, lipase 172  CT: Gallbladder is moderately distended. No definite gallstones or gallbladder wall thickening. Increased caliber of the extrahepatic CBD measures up to 1.4 cm, with mild intrahepatic biliary prominence.   Received in the ED: 1000cc NS bolus x1 (02 Jul 2025 22:14)    INTERVAL HPI:  07/03: Pt seen and examined at bedside. Pt endorses an abundance if gas but denies any nausea, vomiting, constipation, diarrhea, abdominal pain, fever, or chills. Has no other complaints at this time. RUQ ultrasound unremarkable; MRCP shows evidence of 6mm distal common bile duct calculus with associated biliary duct dilatation- suggestive of acute cholecystitis. Diet advanced from NPO.  MRCP today ,elevated LFT  7/4: Pt seen ,examined  MRCP + for CBD stone, AC ramiro, on IV Rocephin, LFT's improving ,On PO diet  7/5: Pt seen and examined. Overnight with some RUQ pain, resolved with IV Toradol. Denies any nausea, vomiting, diarrhea or constipation. LFTs rise from yesterday. Remains on PO diet, tolerating well.   7/6: Pt seen and examined. Has no acute complaints at this time and denies nausea, vomiting, diarrhea or constipation. LFT's downtrending today but still high. Pt scheduled for lap ramiro  tomorrow, started NPO and continue holding Lovenox. ERCP pending possibly Tuesday.   7/7: Pt seen and examined. Has no complaints at this time and denies any abdominal pain. Pt going for laparoscopic cholecystectomy today with tentative ERCP tomorrow. NPO after midnight  7/8: Pt seen and examined. Surgical site clean; no discharge, erythema, drainage. WBC and Neutrophils elevated likely reactive 2/2 to surgery. Pt has no acute complaints and denies any chills, nausea, vomiting, dysuria, SOB, CP, dizziness. Pt is able to ambulate. Has not had a BM since surgery but is passing gas, last BM yesterday. ERCP scheduled for today. Pt was on the phone with his brother, plan was discussed with him.         OVERNIGHT EVENTS:  None    Home Medications:  atorvastatin 10 mg oral tablet: 1 tab(s) orally once a day (at bedtime) (02 Jul 2025 22:27)  oxyBUTYnin 10 mg/24 hr oral tablet, extended release: 1 tab(s) orally once a day (02 Jul 2025 22:27)  sertraline 50 mg oral tablet: 1 tab(s) orally once a day (02 Jul 2025 22:27)      MEDICATIONS  (STANDING):  cefTRIAXone   IVPB 1000 milliGRAM(s) IV Intermittent every 24 hours  oxybutynin XL 10 milliGRAM(s) Oral daily  sodium chloride 0.9%. 1000 milliLiter(s) (75 mL/Hr) IV Continuous <Continuous>    MEDICATIONS  (PRN):  acetaminophen     Tablet .. 1000 milliGRAM(s) Oral every 6 hours PRN Temp greater or equal to 38C (100.4F), Mild Pain (1 - 3)  aluminum hydroxide/magnesium hydroxide/simethicone Suspension 30 milliLiter(s) Oral every 4 hours PRN Dyspepsia  melatonin 3 milliGRAM(s) Oral at bedtime PRN Insomnia  ondansetron Injectable 4 milliGRAM(s) IV Push every 8 hours PRN Nausea and/or Vomiting  oxyCODONE    IR 5 milliGRAM(s) Oral every 6 hours PRN Severe Pain (7 - 10)      Allergies    No Known Allergies    Intolerances        Social History:      REVIEW OF SYSTEMS:  CONSTITUTIONAL: No fever, No chills, No fatigue, No myalgia, No Body ache, No Weakness  EYES: No eye pain,  No visual disturbances, No discharge, NO Redness  ENMT:  No ear pain, No nose bleed, No vertigo; No sinus pain, NO throat pain, No Congestion  NECK: No pain, No stiffness  RESPIRATORY: No cough, NO wheezing, No  hemoptysis, NO  shortness of breath  CARDIOVASCULAR: No chest pain, palpitations  GASTROINTESTINAL: No abdominal pain, NO epigastric pain. No nausea, No vomiting; No diarrhea, No constipation. [  ] BM  GENITOURINARY: No dysuria, No frequency, No urgency, No hematuria, NO incontinence  NEUROLOGICAL: No headaches, No dizziness, No numbness, No tingling, No tremors, No weakness  EXT: No Swelling, No Pain, No Edema  SKIN:  [ X ] No itching, burning, rashes, or lesions   MUSCULOSKELETAL: No joint pain ,No Jt swelling; No muscle pain, No back pain, No extremity pain  PSYCHIATRIC: No depression,  No anxiety,  No mood swings ,No difficulty sleeping at night  PAIN SCALE: [ X ] None  [  ] Other-  ROS Unable to obtain due to - [  ] Dementia  [  ] Lethargy [  ] Drowsy [  ] Sedated [  ] non verbal  REST OF REVIEW Of SYSTEM - [ X ] Normal     Vital Signs Last 24 Hrs  T(C): 36.8 (08 Jul 2025 04:36), Max: 37.8 (07 Jul 2025 10:32)  T(F): 98.3 (08 Jul 2025 04:36), Max: 100.1 (07 Jul 2025 10:32)  HR: 74 (08 Jul 2025 04:36) (66 - 85)  BP: 95/61 (08 Jul 2025 04:36) (95/61 - 104/70)  BP(mean): --  RR: 17 (08 Jul 2025 04:36) (14 - 20)  SpO2: 95% (08 Jul 2025 04:36) (90% - 98%)    Parameters below as of 08 Jul 2025 04:36  Patient On (Oxygen Delivery Method): room air      Finger Stick        07-07 @ 07:01  -  07-08 @ 07:00  --------------------------------------------------------  IN: 1415 mL / OUT: 300 mL / NET: 1115 mL        PHYSICAL EXAM:  GENERAL:  [ X ] NAD , [ X ] well appearing, [  ] Agitated, [  ] Drowsy,  [  ] Lethargy, [  ] confused   HEAD:  [X  ] Normal, [  ] Other  EYES:  [ X ] EOMI, [X  ] PERRLA, [X  ] conjunctiva and sclera clear normal, [  ] Other,  [  ] Pallor,[  ] Discharge  ENMT:  [ X ] Normal, [X  ] Moist mucous membranes, [  ] Good dentition, [X  ] No Thrush  NECK:  [ X ] Supple, [ X ] No JVD, [X  ] Normal thyroid, [  ] Lymphadenopathy [  ] Other  CHEST/LUNG:  [X  ] Clear to auscultation bilaterally, [X  ] Breath Sounds equal B/L / Decrease, [  ] poor effort  [  X] No rales, [ X ] No rhonchi  [ X ]  No wheezing,   HEART:  [ X ] Regular rate and rhythm, [  ] tachycardia, [  ] Bradycardia,  [  ] irregular  [ X ] No murmurs, No rubs, No gallops, [  ] PPM in place (Mfr:  )  ABDOMEN:  [X  ] Soft, [  ] Nontender, [ X ] Nondistended, [ X ] No mass, [ X ] Bowel sounds present, [X  ] obese  NERVOUS SYSTEM:  [ X ] Alert & Oriented X3, [X  ] Nonfocal  [  ] Confusion  [  ] Encephalopathic [  ] Sedated [  ] Unable to assess, [  ] Dementia [  ] Other-  EXTREMITIES: [X  ] 2+ Peripheral Pulses, No clubbing, No cyanosis,  [  ] edema B/L lower EXT. [  ] PVD stasis skin changes B/L Lower EXT, [  ] wound  LYMPH: No lymphadenopathy noted  SKIN:  [  X] No rashes or lesions, [  ] Pressure Ulcers, [  ] ecchymosis, [  ] Skin Tears, [  ] Other    DIET: Diet, Regular:   Low Fat (LOWFAT) (07-07-25 @ 15:44)  Diet, NPO after Midnight:      NPO Start Date: 07-Jul-2025,   NPO Start Time: 23:59 (07-07-25 @ 09:35)      LABS:                        13.0   13.89 )-----------( 296      ( 08 Jul 2025 07:11 )             38.9     08 Jul 2025 07:11    137    |  106    |  8      ----------------------------<  110    3.6     |  25     |  0.66     Ca    8.9        08 Jul 2025 07:11    TPro  7.0    /  Alb  2.8    /  TBili  0.9    /  DBili  x      /  AST  115    /  ALT  438    /  AlkPhos  374    08 Jul 2025 07:11    PT/INR - ( 08 Jul 2025 07:11 )   PT: 12.7 sec;   INR: 1.08 ratio         PTT - ( 08 Jul 2025 07:11 )  PTT:28.6 sec  Urinalysis Basic - ( 08 Jul 2025 07:11 )    Color: x / Appearance: x / SG: x / pH: x  Gluc: 110 mg/dL / Ketone: x  / Bili: x / Urobili: x   Blood: x / Protein: x / Nitrite: x   Leuk Esterase: x / RBC: x / WBC x   Sq Epi: x / Non Sq Epi: x / Bacteria: x                           Anemia Panel:      Thyroid Panel:        Lipase: 172 U/L (07-02-25 @ 17:50)          RADIOLOGY & ADDITIONAL TESTS:      HEALTH ISSUES - PROBLEM Dx:  Transaminitis    Need for prophylactic measure    Anxiety and depression    HLD (hyperlipidemia)    HTN (hypertension)    OAB (overactive bladder)            Consultant(s) Notes Reviewed:  [X  ] YES     Care Discussed with [X] Consultants  [ X ] Patient  [X  ] Family [  ] HCP [  ]   [  ] Social Service  [ X ] RN, [  ] Physical Therapy,[  ] Palliative care team  DVT PPX: [  ] Lovenox, [  ] S C Heparin, [  ] Coumadin, [  ] Xarelto, [  ] Eliquis, [  ] Pradaxa, [  ] IV Heparin drip, [  ] SCD [  ] Contraindication 2 to GI Bleed,[  ] Ambulation [  ] Contraindicated 2 to  bleed [  ] Contraindicated 2 to Brain Bleed  Advanced directive: [X  ] None, [  ] DNR/DNI Patient is a 54y old  Male who presents with a chief complaint of Transaminitis (08 Jul 2025 05:31)    HPI:  Pt is a 55 yo M PMHx depression (possible Bipolar I?), HLD, HTN (not on medication), HLD, overactive bladder? presenting to ED for abnormal labs. Pt saw his PCP and got lab work, showing elevated LFTs so was instructed to go to ED. Pt has no acute complaints at present, denies abdominal pain, fever, chills, n/v/d/c.    ED Course:   Vitals: BP: 104/71, HR: 61, Temp: 98.1F, RR: 18, SpO2: 94% on RA   Labs:  WBC 5.32, Hg 14.5, K 3.5, Gluc 118, Tbili 2.2, D bili 1.2, Alk phos 515, AST//1132, lipase 172  CT: Gallbladder is moderately distended. No definite gallstones or gallbladder wall thickening. Increased caliber of the extrahepatic CBD measures up to 1.4 cm, with mild intrahepatic biliary prominence.   Received in the ED: 1000cc NS bolus x1 (02 Jul 2025 22:14)    INTERVAL HPI:  07/03: Pt seen and examined at bedside. Pt endorses an abundance if gas but denies any nausea, vomiting, constipation, diarrhea, abdominal pain, fever, or chills. Has no other complaints at this time. RUQ ultrasound unremarkable; MRCP shows evidence of 6mm distal common bile duct calculus with associated biliary duct dilatation- suggestive of acute cholecystitis. Diet advanced from NPO.  MRCP today ,elevated LFT  7/4: Pt seen ,examined  MRCP + for CBD stone, AC ramiro, on IV Rocephin, LFT's improving ,On PO diet  7/5: Pt seen and examined. Overnight with some RUQ pain, resolved with IV Toradol. Denies any nausea, vomiting, diarrhea or constipation. LFTs rise from yesterday. Remains on PO diet, tolerating well.   7/6: Pt seen and examined. Has no acute complaints at this time and denies nausea, vomiting, diarrhea or constipation. LFT's downtrending today but still high. Pt scheduled for lap ramiro  tomorrow, started NPO and continue holding Lovenox. ERCP pending possibly Tuesday.   7/7: Pt seen and examined. Has no complaints at this time and denies any abdominal pain. Pt going for laparoscopic cholecystectomy today with tentative ERCP tomorrow. NPO after midnight  7/8: Pt seen and examined. POD # 1 Surgical site clean; no discharge, erythema, drainage. WBC and Neutrophils elevated likely reactive 2/2 to surgery. Pt has no acute complaints and denies any chills, nausea, vomiting, dysuria, SOB, CP, dizziness. Pt is able to ambulate. Has not had a BM since surgery but is passing gas, last BM yesterday. ERCP scheduled for today. Pt was on the phone with his brother, plan was discussed with him.         OVERNIGHT EVENTS:  None    Home Medications:  atorvastatin 10 mg oral tablet: 1 tab(s) orally once a day (at bedtime) (02 Jul 2025 22:27)  oxyBUTYnin 10 mg/24 hr oral tablet, extended release: 1 tab(s) orally once a day (02 Jul 2025 22:27)  sertraline 50 mg oral tablet: 1 tab(s) orally once a day (02 Jul 2025 22:27)      MEDICATIONS  (STANDING):  cefTRIAXone   IVPB 1000 milliGRAM(s) IV Intermittent every 24 hours  oxybutynin XL 10 milliGRAM(s) Oral daily  sodium chloride 0.9%. 1000 milliLiter(s) (75 mL/Hr) IV Continuous <Continuous>    MEDICATIONS  (PRN):  acetaminophen     Tablet .. 1000 milliGRAM(s) Oral every 6 hours PRN Temp greater or equal to 38C (100.4F), Mild Pain (1 - 3)  aluminum hydroxide/magnesium hydroxide/simethicone Suspension 30 milliLiter(s) Oral every 4 hours PRN Dyspepsia  melatonin 3 milliGRAM(s) Oral at bedtime PRN Insomnia  ondansetron Injectable 4 milliGRAM(s) IV Push every 8 hours PRN Nausea and/or Vomiting  oxyCODONE    IR 5 milliGRAM(s) Oral every 6 hours PRN Severe Pain (7 - 10)      Allergies    No Known Allergies    Intolerances        Social History:      REVIEW OF SYSTEMS: no pain   CONSTITUTIONAL: No fever, No chills, No fatigue, No myalgia, No Body ache, No Weakness  EYES: No eye pain,  No visual disturbances, No discharge, NO Redness  ENMT:  No ear pain, No nose bleed, No vertigo; No sinus pain, NO throat pain, No Congestion  NECK: No pain, No stiffness  RESPIRATORY: No cough, NO wheezing, No  hemoptysis, NO  shortness of breath  CARDIOVASCULAR: No chest pain, palpitations  GASTROINTESTINAL: No abdominal pain, NO epigastric pain. No nausea, No vomiting; No diarrhea, No constipation. [  ] BM  GENITOURINARY: No dysuria, No frequency, No urgency, No hematuria, NO incontinence  NEUROLOGICAL: No headaches, No dizziness, No numbness, No tingling, No tremors, No weakness  EXT: No Swelling, No Pain, No Edema  SKIN:  [ X ] No itching, burning, rashes, or lesions   MUSCULOSKELETAL: No joint pain ,No Jt swelling; No muscle pain, No back pain, No extremity pain  PSYCHIATRIC: No depression,  No anxiety,  No mood swings ,No difficulty sleeping at night  PAIN SCALE: [ X ] None  [  ] Other-  ROS Unable to obtain due to - [  ] Dementia  [  ] Lethargy [  ] Drowsy [  ] Sedated [  ] non verbal  REST OF REVIEW Of SYSTEM - [ X ] Normal     Vital Signs Last 24 Hrs  T(C): 36.8 (08 Jul 2025 04:36), Max: 37.8 (07 Jul 2025 10:32)  T(F): 98.3 (08 Jul 2025 04:36), Max: 100.1 (07 Jul 2025 10:32)  HR: 74 (08 Jul 2025 04:36) (66 - 85)  BP: 95/61 (08 Jul 2025 04:36) (95/61 - 104/70)  BP(mean): --  RR: 17 (08 Jul 2025 04:36) (14 - 20)  SpO2: 95% (08 Jul 2025 04:36) (90% - 98%)    Parameters below as of 08 Jul 2025 04:36  Patient On (Oxygen Delivery Method): room air      Finger Stick        07-07 @ 07:01  -  07-08 @ 07:00  --------------------------------------------------------  IN: 1415 mL / OUT: 300 mL / NET: 1115 mL        PHYSICAL EXAM:  GENERAL:  [ X ] NAD , [ X ] well appearing, [  ] Agitated, [  ] Drowsy,  [  ] Lethargy, [  ] confused   HEAD:  [X  ] Normal, [  ] Other  EYES:  [ X ] EOMI, [X  ] PERRLA, [X  ] conjunctiva and sclera clear normal, [  ] Other,  [  ] Pallor,[  ] Discharge  ENMT:  [ X ] Normal, [X  ] Moist mucous membranes, [  ] Good dentition, [X  ] No Thrush  NECK:  [ X ] Supple, [ X ] No JVD, [X  ] Normal thyroid, [  ] Lymphadenopathy [  ] Other  CHEST/LUNG:  [X  ] Clear to auscultation bilaterally, [X  ] Breath Sounds equal B/L / Decrease, [  ] poor effort  [  X] No rales, [ X ] No rhonchi  [ X ]  No wheezing,   HEART:  [ X ] Regular rate and rhythm, [  ] tachycardia, [  ] Bradycardia,  [  ] irregular  [ X ] No murmurs, No rubs, No gallops, [  ] PPM in place (Mfr:  )  ABDOMEN:  [X  ] Soft, [  ] Nontender, [ X ] Nondistended, [ X ] No mass, [ X ] Bowel sounds present, [X  ] obese, Surgical wound clean,No pain  NERVOUS SYSTEM:  [ X ] Alert & Oriented X3, [X  ] Nonfocal  [  ] Confusion  [  ] Encephalopathic [  ] Sedated [  ] Unable to assess, [  ] Dementia [  ] Other-  EXTREMITIES: [X  ] 2+ Peripheral Pulses, No clubbing, No cyanosis,  [  ] edema B/L lower EXT. [  ] PVD stasis skin changes B/L Lower EXT, [  ] wound  LYMPH: No lymphadenopathy noted  SKIN:  [  X] No rashes or lesions, [  ] Pressure Ulcers, [  ] ecchymosis, [  ] Skin Tears, [  ] Other    DIET: Diet, Regular:   Low Fat (LOWFAT) (07-07-25 @ 15:44)  Diet, NPO after Midnight:      NPO Start Date: 07-Jul-2025,   NPO Start Time: 23:59 (07-07-25 @ 09:35)      LABS:                        13.0   13.89 )-----------( 296      ( 08 Jul 2025 07:11 )             38.9     08 Jul 2025 07:11    137    |  106    |  8      ----------------------------<  110    3.6     |  25     |  0.66     Ca    8.9        08 Jul 2025 07:11    TPro  7.0    /  Alb  2.8    /  TBili  0.9    /  DBili  x      /  AST  115    /  ALT  438    /  AlkPhos  374    08 Jul 2025 07:11    PT/INR - ( 08 Jul 2025 07:11 )   PT: 12.7 sec;   INR: 1.08 ratio         PTT - ( 08 Jul 2025 07:11 )  PTT:28.6 sec  Urinalysis Basic - ( 08 Jul 2025 07:11 )    Color: x / Appearance: x / SG: x / pH: x  Gluc: 110 mg/dL / Ketone: x  / Bili: x / Urobili: x   Blood: x / Protein: x / Nitrite: x   Leuk Esterase: x / RBC: x / WBC x   Sq Epi: x / Non Sq Epi: x / Bacteria: x        Lipase: 172 U/L (07-02-25 @ 17:50)          RADIOLOGY & ADDITIONAL TESTS:      HEALTH ISSUES - PROBLEM Dx:  Transaminitis    Need for prophylactic measure    Anxiety and depression    HLD (hyperlipidemia)    HTN (hypertension)    OAB (overactive bladder)            Consultant(s) Notes Reviewed:  [X  ] YES     Care Discussed with [X] Consultants  [ X ] Patient  [X  ] Family [  ] HCP [  ]   [  ] Social Service  [ X ] RN, [  ] Physical Therapy,[  ] Palliative care team  DVT PPX: [  ] Lovenox, [  ] S C Heparin, [  ] Coumadin, [  ] Xarelto, [  ] Eliquis, [  ] Pradaxa, [  ] IV Heparin drip, [  ] SCD [  ] Contraindication 2 to GI Bleed,[  ] Ambulation [  ] Contraindicated 2 to  bleed [  ] Contraindicated 2 to Brain Bleed  Advanced directive: [X  ] None, [  ] DNR/DNI

## 2025-07-08 NOTE — PROGRESS NOTE ADULT - PROBLEM SELECTOR PLAN 1
transaminitis-elevated LFT's -CBD stone obstruction  - Afebrile, no leukocytosis.  - CT A/P with IV contrast: gallbladder is moderately distended. No definite gallstones or gallbladder wall thickening. Increased caliber of the extrahepatic CBD measures up to 1.4 cm, with mild intrahepatic biliary prominence.   - RUQ US WNL  - MRCP 6 mm distal common bile duct calculus with associated biliary duct dilatation AC ramiro   - advanced diet   - AM  LFTs  - Rocephin 1g QD   - Hold home statin  - Avoid hepatotoxic meds  - Surgery completed (Dr. Johnson)  OR on 7/7 for Lap Ramiro  - GI (Dr. Solorio) ERCP 7/8 ,On  IV Abx as AC Ramiro -MRCP 6 mm distal common bile duct calculus with associated biliary duct dilatation AC ramiro   -IV Rocephin daily  S/P Robotic Lap Ramiro by Dr Johnson   Sx follow up  Pain meds as per Sx team  Incentive spirometer   ambulation  LFT improving

## 2025-07-08 NOTE — PROGRESS NOTE ADULT - PROBLEM SELECTOR PLAN 6
Lovenox 40 mg SC daily  Held for procedures Possible OAB, sees uro and prescribed oxybutynin   - c/w home oxybutynin

## 2025-07-08 NOTE — PROGRESS NOTE ADULT - SUBJECTIVE AND OBJECTIVE BOX
s/p ercp    difficult cannulation  PD stent required  no obvious stone was seen    rec:   clear liquid diet  Advance diet as tolerated in am   upper gastrointestinal endoscopy as outpatient for PD stent removal in 2-4 weeks

## 2025-07-08 NOTE — PROGRESS NOTE ADULT - PROBLEM SELECTOR PLAN 2
Chronic  - c/w home zoloft  - Possible Bipolar I disorder, pt hospitalized at [facility] for psychotic episode- discharged on Risperdal 3mg qhs, Depakote DR 500mg BID, Klonopin to 0.5mg qhs. Per pt no longer taking transaminitis-elevated LFT's -CBD stone obstruction  - Afebrile, no leukocytosis.  - CT A/P with IV contrast: gallbladder is moderately distended. No definite gallstones or gallbladder wall thickening. Increased caliber of the extrahepatic CBD measures up to 1.4 cm, with mild intrahepatic biliary prominence.   - RUQ US WNL  - MRCP 6 mm distal common bile duct calculus with associated biliary duct dilatation AC ramiro   - AM  LFTs  - Rocephin 1g QD   - Hold home statin  - Surgery completed (Dr. Johnson)  OR on 7/7 for Lap Ramiro  - GI (Dr. Turcios  ERCP 7/8 ,On  IV Abx

## 2025-07-08 NOTE — PROGRESS NOTE ADULT - PROBLEM SELECTOR PLAN 3
Chronic  - Hold home statin 2/2 transaminitis Chronic  - c/w home zoloft  - Possible Bipolar I disorder, pt hospitalized at [facility] for psychotic episode- discharged on Risperdal 3mg qhs, Depakote DR 500mg BID, Klonopin to 0.5mg qhs. Per pt no longer taking

## 2025-07-09 ENCOUNTER — TRANSCRIPTION ENCOUNTER (OUTPATIENT)
Age: 55
End: 2025-07-09

## 2025-07-09 VITALS
OXYGEN SATURATION: 94 % | HEART RATE: 58 BPM | SYSTOLIC BLOOD PRESSURE: 103 MMHG | TEMPERATURE: 98 F | DIASTOLIC BLOOD PRESSURE: 70 MMHG | RESPIRATION RATE: 18 BRPM

## 2025-07-09 DIAGNOSIS — R09.02 HYPOXEMIA: ICD-10-CM

## 2025-07-09 LAB
ALBUMIN SERPL ELPH-MCNC: 2.9 G/DL — LOW (ref 3.3–5)
ALP SERPL-CCNC: 324 U/L — HIGH (ref 40–120)
ALT FLD-CCNC: 352 U/L — HIGH (ref 12–78)
ANION GAP SERPL CALC-SCNC: 8 MMOL/L — SIGNIFICANT CHANGE UP (ref 5–17)
AST SERPL-CCNC: 66 U/L — HIGH (ref 15–37)
BASOPHILS # BLD AUTO: 0.02 K/UL — SIGNIFICANT CHANGE UP (ref 0–0.2)
BASOPHILS NFR BLD AUTO: 0.2 % — SIGNIFICANT CHANGE UP (ref 0–2)
BILIRUB SERPL-MCNC: 0.7 MG/DL — SIGNIFICANT CHANGE UP (ref 0.2–1.2)
BUN SERPL-MCNC: 8 MG/DL — SIGNIFICANT CHANGE UP (ref 7–23)
CALCIUM SERPL-MCNC: 8.6 MG/DL — SIGNIFICANT CHANGE UP (ref 8.5–10.1)
CHLORIDE SERPL-SCNC: 107 MMOL/L — SIGNIFICANT CHANGE UP (ref 96–108)
CO2 SERPL-SCNC: 23 MMOL/L — SIGNIFICANT CHANGE UP (ref 22–31)
CREAT SERPL-MCNC: 0.41 MG/DL — LOW (ref 0.5–1.3)
EGFR: 130 ML/MIN/1.73M2 — SIGNIFICANT CHANGE UP
EGFR: 130 ML/MIN/1.73M2 — SIGNIFICANT CHANGE UP
EOSINOPHIL # BLD AUTO: 0.01 K/UL — SIGNIFICANT CHANGE UP (ref 0–0.5)
EOSINOPHIL NFR BLD AUTO: 0.1 % — SIGNIFICANT CHANGE UP (ref 0–6)
GLUCOSE SERPL-MCNC: 115 MG/DL — HIGH (ref 70–99)
HCT VFR BLD CALC: 41.7 % — SIGNIFICANT CHANGE UP (ref 39–50)
HGB BLD-MCNC: 13.7 G/DL — SIGNIFICANT CHANGE UP (ref 13–17)
IMM GRANULOCYTES # BLD AUTO: 0.04 K/UL — SIGNIFICANT CHANGE UP (ref 0–0.07)
IMM GRANULOCYTES NFR BLD AUTO: 0.3 % — SIGNIFICANT CHANGE UP (ref 0–0.9)
LYMPHOCYTES # BLD AUTO: 1.21 K/UL — SIGNIFICANT CHANGE UP (ref 1–3.3)
LYMPHOCYTES NFR BLD AUTO: 10.1 % — LOW (ref 13–44)
MCHC RBC-ENTMCNC: 29.5 PG — SIGNIFICANT CHANGE UP (ref 27–34)
MCHC RBC-ENTMCNC: 32.9 G/DL — SIGNIFICANT CHANGE UP (ref 32–36)
MCV RBC AUTO: 89.7 FL — SIGNIFICANT CHANGE UP (ref 80–100)
MONOCYTES # BLD AUTO: 0.52 K/UL — SIGNIFICANT CHANGE UP (ref 0–0.9)
MONOCYTES NFR BLD AUTO: 4.3 % — SIGNIFICANT CHANGE UP (ref 2–14)
NEUTROPHILS # BLD AUTO: 10.21 K/UL — HIGH (ref 1.8–7.4)
NEUTROPHILS NFR BLD AUTO: 85 % — HIGH (ref 43–77)
NRBC # BLD AUTO: 0 K/UL — SIGNIFICANT CHANGE UP (ref 0–0)
NRBC # FLD: 0 K/UL — SIGNIFICANT CHANGE UP (ref 0–0)
NRBC BLD AUTO-RTO: 0 /100 WBCS — SIGNIFICANT CHANGE UP (ref 0–0)
PLATELET # BLD AUTO: 340 K/UL — SIGNIFICANT CHANGE UP (ref 150–400)
PMV BLD: 9 FL — SIGNIFICANT CHANGE UP (ref 7–13)
POTASSIUM SERPL-MCNC: 3.6 MMOL/L — SIGNIFICANT CHANGE UP (ref 3.5–5.3)
POTASSIUM SERPL-SCNC: 3.6 MMOL/L — SIGNIFICANT CHANGE UP (ref 3.5–5.3)
PROT SERPL-MCNC: 7.7 G/DL — SIGNIFICANT CHANGE UP (ref 6–8.3)
RBC # BLD: 4.65 M/UL — SIGNIFICANT CHANGE UP (ref 4.2–5.8)
RBC # FLD: 14.6 % — HIGH (ref 10.3–14.5)
SODIUM SERPL-SCNC: 138 MMOL/L — SIGNIFICANT CHANGE UP (ref 135–145)
WBC # BLD: 12.01 K/UL — HIGH (ref 3.8–10.5)
WBC # FLD AUTO: 12.01 K/UL — HIGH (ref 3.8–10.5)

## 2025-07-09 PROCEDURE — 85730 THROMBOPLASTIN TIME PARTIAL: CPT

## 2025-07-09 PROCEDURE — 80074 ACUTE HEPATITIS PANEL: CPT

## 2025-07-09 PROCEDURE — C1052: CPT

## 2025-07-09 PROCEDURE — 80053 COMPREHEN METABOLIC PANEL: CPT

## 2025-07-09 PROCEDURE — 85025 COMPLETE CBC W/AUTO DIFF WBC: CPT

## 2025-07-09 PROCEDURE — 88304 TISSUE EXAM BY PATHOLOGIST: CPT

## 2025-07-09 PROCEDURE — 86850 RBC ANTIBODY SCREEN: CPT

## 2025-07-09 PROCEDURE — C2625: CPT

## 2025-07-09 PROCEDURE — 85027 COMPLETE CBC AUTOMATED: CPT

## 2025-07-09 PROCEDURE — 86901 BLOOD TYPING SEROLOGIC RH(D): CPT

## 2025-07-09 PROCEDURE — C1889: CPT

## 2025-07-09 PROCEDURE — C1769: CPT

## 2025-07-09 PROCEDURE — 74177 CT ABD & PELVIS W/CONTRAST: CPT

## 2025-07-09 PROCEDURE — 83690 ASSAY OF LIPASE: CPT

## 2025-07-09 PROCEDURE — A9579: CPT

## 2025-07-09 PROCEDURE — 99285 EMERGENCY DEPT VISIT HI MDM: CPT

## 2025-07-09 PROCEDURE — 71045 X-RAY EXAM CHEST 1 VIEW: CPT

## 2025-07-09 PROCEDURE — 96372 THER/PROPH/DIAG INJ SC/IM: CPT

## 2025-07-09 PROCEDURE — S2900: CPT

## 2025-07-09 PROCEDURE — 36415 COLL VENOUS BLD VENIPUNCTURE: CPT

## 2025-07-09 PROCEDURE — 76705 ECHO EXAM OF ABDOMEN: CPT

## 2025-07-09 PROCEDURE — 76000 FLUOROSCOPY <1 HR PHYS/QHP: CPT

## 2025-07-09 PROCEDURE — 85610 PROTHROMBIN TIME: CPT

## 2025-07-09 PROCEDURE — 80076 HEPATIC FUNCTION PANEL: CPT

## 2025-07-09 PROCEDURE — 80048 BASIC METABOLIC PNL TOTAL CA: CPT

## 2025-07-09 PROCEDURE — 82248 BILIRUBIN DIRECT: CPT

## 2025-07-09 PROCEDURE — 74183 MRI ABD W/O CNTR FLWD CNTR: CPT

## 2025-07-09 PROCEDURE — 86900 BLOOD TYPING SEROLOGIC ABO: CPT

## 2025-07-09 PROCEDURE — 71045 X-RAY EXAM CHEST 1 VIEW: CPT | Mod: 26

## 2025-07-09 PROCEDURE — 93005 ELECTROCARDIOGRAM TRACING: CPT

## 2025-07-09 PROCEDURE — C9399: CPT

## 2025-07-09 RX ORDER — ATORVASTATIN CALCIUM 80 MG/1
1 TABLET, FILM COATED ORAL
Refills: 0 | DISCHARGE

## 2025-07-09 RX ORDER — SERTRALINE 100 MG/1
1 TABLET, FILM COATED ORAL
Refills: 0 | DISCHARGE

## 2025-07-09 RX ADMIN — OXYBUTYNIN CHLORIDE 10 MILLIGRAM(S): 5 TABLET, FILM COATED, EXTENDED RELEASE ORAL at 11:06

## 2025-07-09 NOTE — PROVIDER CONTACT NOTE (OTHER) - ASSESSMENT
pt in bed alert and oriented, on 3L nasal cannula.
pt in bed alert and oriented, on room air. pt denies dizziness and SOB.

## 2025-07-09 NOTE — PROGRESS NOTE ADULT - PROVIDER SPECIALTY LIST ADULT
Gastroenterology
Surgery
Gastroenterology
Internal Medicine
Gastroenterology
Gastroenterology
Internal Medicine
Surgery
Gastroenterology
Gastroenterology
Internal Medicine

## 2025-07-09 NOTE — PROVIDER CONTACT NOTE (OTHER) - ACTION/TREATMENT ORDERED:
CXR ordered. No other interventions ordered at this time.
Nasal cannula 3L was applied. Oxygen saturation went up to 90's. No other interventions ordered at this time

## 2025-07-09 NOTE — PROGRESS NOTE ADULT - SUBJECTIVE AND OBJECTIVE BOX
[facility]  Yasmany Snell NP  [address] [place] [address]  [phone]      INTERVAL HPI/OVERNIGHT EVENTS:  Pt s/e  Pt denies abdominal pain s/p ERCP  O2 sat decreased this am but now improved  He feels well without GI complaints    MEDICATIONS  (STANDING):  cefTRIAXone   IVPB 1000 milliGRAM(s) IV Intermittent every 24 hours  oxybutynin XL 10 milliGRAM(s) Oral daily  sodium chloride 0.9%. 1000 milliLiter(s) (75 mL/Hr) IV Continuous <Continuous>    MEDICATIONS  (PRN):  acetaminophen     Tablet .. 1000 milliGRAM(s) Oral every 6 hours PRN Temp greater or equal to 38C (100.4F), Mild Pain (1 - 3)  aluminum hydroxide/magnesium hydroxide/simethicone Suspension 30 milliLiter(s) Oral every 4 hours PRN Dyspepsia  melatonin 3 milliGRAM(s) Oral at bedtime PRN Insomnia  ondansetron Injectable 4 milliGRAM(s) IV Push every 8 hours PRN Nausea and/or Vomiting  oxyCODONE    IR 5 milliGRAM(s) Oral every 6 hours PRN Severe Pain (7 - 10)      Allergies  No Known Allergies      PHYSICAL EXAM:   Vital Signs:  Vital Signs Last 24 Hrs  T(C): 36.4 (09 Jul 2025 11:30), Max: 37 (08 Jul 2025 12:40)  T(F): 97.6 (09 Jul 2025 11:30), Max: 98.6 (08 Jul 2025 12:40)  HR: 58 (09 Jul 2025 11:30) (50 - 73)  BP: 103/70 (09 Jul 2025 11:30) (86/69 - 125/78)  BP(mean): --  RR: 18 (09 Jul 2025 11:30) (12 - 19)  SpO2: 94% (09 Jul 2025 11:30) (90% - 98%)    Parameters below as of 09 Jul 2025 11:30  Patient On (Oxygen Delivery Method): room air      Daily Height in cm: 160 (08 Jul 2025 12:40)    Daily     GENERAL:  Appears stated age  HEENT:  NC/AT  CHEST:  Full & symmetric excursion  HEART:  Regular rhythm  ABDOMEN:  Soft, non-tender, non-distended  EXTEREMITIES:  no cyanosis  SKIN:  No rash  NEURO:  Alert      LABS:                        13.7   12.01 )-----------( 340      ( 09 Jul 2025 08:15 )             41.7     07-09    138  |  107  |  8   ----------------------------<  115[H]  3.6   |  23  |  0.41[L]    Ca    8.6      09 Jul 2025 08:15    TPro  7.7  /  Alb  2.9[L]  /  TBili  0.7  /  DBili  x   /  AST  66[H]  /  ALT  352[H]  /  AlkPhos  324[H]  07-09    PT/INR - ( 08 Jul 2025 07:11 )   PT: 12.7 sec;   INR: 1.08 ratio         PTT - ( 08 Jul 2025 07:11 )  PTT:28.6 sec  Urinalysis Basic - ( 09 Jul 2025 08:15 )    Color: x / Appearance: x / SG: x / pH: x  Gluc: 115 mg/dL / Ketone: x  / Bili: x / Urobili: x   Blood: x / Protein: x / Nitrite: x   Leuk Esterase: x / RBC: x / WBC x   Sq Epi: x / Non Sq Epi: x / Bacteria: x

## 2025-07-09 NOTE — PROGRESS NOTE ADULT - PROBLEM SELECTOR PLAN 3
Pt desaturated to low 70's overnight via remote pulse ox, and given 6L O2 NC- saturating at 94%  Asymptomatic  Desaturated to  80s this morning while pt was laying down and on 3L NC, was helped up and saturation improved to 95%.  -Currently 94% on RA  -f/u continuous puls ox  -Encourage use of incentive spirometer Pt desaturated to low 70's overnight via remote pulse ox, and given 6L O2 NC- saturating at 94%  Asymptomatic  Desaturated to  80s this morning while pt was laying down and on 3L NC, was helped up and saturation improved to 95%.  -Currently 94% on RA, incentive spirometry   -f/u continuous puls ox  -Encourage use of incentive spirometer  Mild leukocytosis -PO ceftin 500 mg 2x day x 5  days   -2/2 Atelectasis likely post Sx on CXR  Out pt Sleep study to r/o JOMAR ,[facility]

## 2025-07-09 NOTE — PROGRESS NOTE ADULT - SUBJECTIVE AND OBJECTIVE BOX
Patient is a 54y old  Male who presents with a chief complaint of Transaminitis (09 Jul 2025 10:43)    HPI:  Pt is a 53 yo M PMHx depression (possible Bipolar I?), HLD, HTN (not on medication), HLD, overactive bladder? presenting to ED for abnormal labs. Pt saw his PCP and got lab work, showing elevated LFTs so was instructed to go to ED. Pt has no acute complaints at present, denies abdominal pain, fever, chills, n/v/d/c.    ED Course:   Vitals: BP: 104/71, HR: 61, Temp: 98.1F, RR: 18, SpO2: 94% on RA   Labs:  WBC 5.32, Hg 14.5, K 3.5, Gluc 118, Tbili 2.2, D bili 1.2, Alk phos 515, AST//1132, lipase 172  CT: Gallbladder is moderately distended. No definite gallstones or gallbladder wall thickening. Increased caliber of the extrahepatic CBD measures up to 1.4 cm, with mild intrahepatic biliary prominence.   Received in the ED: 1000cc NS bolus x1 (02 Jul 2025 22:14)    INTERVAL HPI:  07/03: Pt seen and examined at bedside. Pt endorses an abundance if gas but denies any nausea, vomiting, constipation, diarrhea, abdominal pain, fever, or chills. Has no other complaints at this time. RUQ ultrasound unremarkable; MRCP shows evidence of 6mm distal common bile duct calculus with associated biliary duct dilatation- suggestive of acute cholecystitis. Diet advanced from NPO.  MRCP today ,elevated LFT  7/4: Pt seen ,examined  MRCP + for CBD stone, AC ramiro, on IV Rocephin, LFT's improving ,On PO diet  7/5: Pt seen and examined. Overnight with some RUQ pain, resolved with IV Toradol. Denies any nausea, vomiting, diarrhea or constipation. LFTs rise from yesterday. Remains on PO diet, tolerating well.   7/6: Pt seen and examined. Has no acute complaints at this time and denies nausea, vomiting, diarrhea or constipation. LFT's downtrending today but still high. Pt scheduled for lap ramiro  tomorrow, started NPO and continue holding Lovenox. ERCP pending possibly Tuesday.   7/7: Pt seen and examined. Has no complaints at this time and denies any abdominal pain. Pt going for laparoscopic cholecystectomy today with tentative ERCP tomorrow. NPO after midnight  7/8: Pt seen and examined. POD # 1 Surgical site clean; no discharge, erythema, drainage. WBC and Neutrophils elevated likely reactive 2/2 to surgery. Pt has no acute complaints and denies any chills, nausea, vomiting, dysuria, SOB, CP, dizziness. Pt is able to ambulate. Has not had a BM since surgery but is passing gas, last BM yesterday. ERCP scheduled for today. Pt was on the phone with his brother, plan was discussed with him.   7/9: Pt seen and examined. Remote pulse ox showed desaturation to the 80s this morning while pt was laying down and on 3L NC, Pt was helped up and saturation improved to 95%. Pt complains of no SOB, but had some dizziness while he was being helped up. Pt encouraged to use incentive spirometry. Has no acute complaints at this time and is passing flatus. Pt was weaned off O2 and is saturating at 94% on RA. Surgery cleared for d/c if advanced regular diet is tolerated. Placed on continuous pulse ox for monitoring.       OVERNIGHT EVENTS:  Pt desaturated to low 70's via remote pulse ox, was put on venti max. Given 6L O2 NC and was saturating at 94%. Pt had no SOB, dizziness CP, or any other acute symptoms.     Home Medications:  atorvastatin 10 mg oral tablet: 1 tab(s) orally once a day (at bedtime) (02 Jul 2025 22:27)  oxyBUTYnin 10 mg/24 hr oral tablet, extended release: 1 tab(s) orally once a day (02 Jul 2025 22:27)  sertraline 50 mg oral tablet: 1 tab(s) orally once a day (02 Jul 2025 22:27)      MEDICATIONS  (STANDING):  cefTRIAXone   IVPB 1000 milliGRAM(s) IV Intermittent every 24 hours  oxybutynin XL 10 milliGRAM(s) Oral daily  sodium chloride 0.9%. 1000 milliLiter(s) (75 mL/Hr) IV Continuous <Continuous>    MEDICATIONS  (PRN):  acetaminophen     Tablet .. 1000 milliGRAM(s) Oral every 6 hours PRN Temp greater or equal to 38C (100.4F), Mild Pain (1 - 3)  aluminum hydroxide/magnesium hydroxide/simethicone Suspension 30 milliLiter(s) Oral every 4 hours PRN Dyspepsia  melatonin 3 milliGRAM(s) Oral at bedtime PRN Insomnia  ondansetron Injectable 4 milliGRAM(s) IV Push every 8 hours PRN Nausea and/or Vomiting  oxyCODONE    IR 5 milliGRAM(s) Oral every 6 hours PRN Severe Pain (7 - 10)      Allergies    No Known Allergies    Intolerances        Social History:      REVIEW OF SYSTEMS:  CONSTITUTIONAL: No fever, No chills, No fatigue, No myalgia, No Body ache, No Weakness  EYES: No eye pain,  No visual disturbances, No discharge, NO Redness  ENMT:  No ear pain, No nose bleed, No vertigo; No sinus pain, NO throat pain, No Congestion  NECK: No pain, No stiffness  RESPIRATORY: No cough, NO wheezing, No  hemoptysis, NO  shortness of breath  CARDIOVASCULAR: No chest pain, palpitations  GASTROINTESTINAL: No abdominal pain, NO epigastric pain. No nausea, No vomiting; No diarrhea, No constipation. [  ] BM  GENITOURINARY: No dysuria, No frequency, No urgency, No hematuria, NO incontinence  NEUROLOGICAL: No headaches, No dizziness, No numbness, No tingling, No tremors, No weakness  EXT: No Swelling, No Pain, No Edema  SKIN:  [ X ] No itching, burning, rashes, or lesions   MUSCULOSKELETAL: No joint pain ,No Jt swelling; No muscle pain, No back pain, No extremity pain  PSYCHIATRIC: No depression,  No anxiety,  No mood swings ,No difficulty sleeping at night  PAIN SCALE: [ X ] None  [  ] Other-  ROS Unable to obtain due to - [  ] Dementia  [  ] Lethargy [  ] Drowsy [  ] Sedated [  ] non verbal  REST OF REVIEW Of SYSTEM - [X  ] Normal     Vital Signs Last 24 Hrs  T(C): 36.3 (09 Jul 2025 08:23), Max: 37 (08 Jul 2025 12:40)  T(F): 97.3 (09 Jul 2025 08:23), Max: 98.6 (08 Jul 2025 12:40)  HR: 50 (09 Jul 2025 04:18) (50 - 73)  BP: 95/62 (09 Jul 2025 04:18) (86/69 - 125/78)  BP(mean): --  RR: 18 (09 Jul 2025 08:23) (12 - 19)  SpO2: 94% (09 Jul 2025 09:14) (90% - 98%)    Parameters below as of 09 Jul 2025 09:14  Patient On (Oxygen Delivery Method): room air      Finger Stick        07-08 @ 07:01  -  07-09 @ 07:00  --------------------------------------------------------  IN: 550 mL / OUT: 650 mL / NET: -100 mL        PHYSICAL EXAM:  GENERAL:  [X  ] NAD , [X  ] well appearing, [  ] Agitated, [  ] Drowsy,  [  ] Lethargy, [  ] confused   HEAD:  [ X ] Normal, [  ] Other  EYES:  [ X ] EOMI, [X  ] PERRLA, [X  ] conjunctiva and sclera clear normal, [  ] Other,  [  ] Pallor,[  ] Discharge  ENMT:  [X  ] Normal, [X  ] Moist mucous membranes, [ X ] Good dentition, [X  ] No Thrush  NECK:  [ X ] Supple, [  X] No JVD, [ X ] Normal thyroid, [  ] Lymphadenopathy [  ] Other  CHEST/LUNG:  [X  ] Clear to auscultation bilaterally, [ X ] Breath Sounds equal bilaterally mildly Decreased at lower bases, [  ] poor effort  [ X ] No rales, [X  ] No rhonchi  [X  ]  No wheezing,   HEART:  [X  ] Regular rate and rhythm, [  ] tachycardia, [  ] Bradycardia,  [  ] irregular  [ X ] No murmurs, No rubs, No gallops, [  ] PPM in place (Mfr:  )  ABDOMEN: surgical incisions CDI [ X ] Soft, [  X] Nontender, [X  ] Nondistended, [X  ] No mass, [ X ] Bowel sounds present, [  ] obese  NERVOUS SYSTEM:  [ X ] Alert & Oriented X3, [ X ] Nonfocal  [  ] Confusion  [  ] Encephalopathic [  ] Sedated [  ] Unable to assess, [  ] Dementia [  ] Other-  EXTREMITIES: [ X ] 2+ Peripheral Pulses, No clubbing, No cyanosis,  [  ] edema B/L lower EXT. [  ] PVD stasis skin changes B/L Lower EXT, [  ] wound  LYMPH: No lymphadenopathy noted  SKIN:  [X  ] No rashes or lesions, [  ] Pressure Ulcers, [  ] ecchymosis, [  ] Skin Tears, [  ] Other    DIET: Diet, Regular:   Low Fat (LOWFAT) (07-09-25 @ 09:58)      LABS:                        13.7   12.01 )-----------( 340      ( 09 Jul 2025 08:15 )             41.7     09 Jul 2025 08:15    138    |  107    |  8      ----------------------------<  115    3.6     |  23     |  0.41     Ca    8.6        09 Jul 2025 08:15    TPro  7.7    /  Alb  2.9    /  TBili  0.7    /  DBili  x      /  AST  66     /  ALT  352    /  AlkPhos  324    09 Jul 2025 08:15    PT/INR - ( 08 Jul 2025 07:11 )   PT: 12.7 sec;   INR: 1.08 ratio         PTT - ( 08 Jul 2025 07:11 )  PTT:28.6 sec  Urinalysis Basic - ( 09 Jul 2025 08:15 )    Color: x / Appearance: x / SG: x / pH: x  Gluc: 115 mg/dL / Ketone: x  / Bili: x / Urobili: x   Blood: x / Protein: x / Nitrite: x   Leuk Esterase: x / RBC: x / WBC x   Sq Epi: x / Non Sq Epi: x / Bacteria: x                           Anemia Panel:      Thyroid Panel:        Lipase: 172 U/L (07-02-25 @ 17:50)          RADIOLOGY & ADDITIONAL TESTS:      HEALTH ISSUES - PROBLEM Dx:  Transaminitis    Need for prophylactic measure    Anxiety and depression    HLD (hyperlipidemia)    HTN (hypertension)    OAB (overactive bladder)    Acute cholecystitis            Consultant(s) Notes Reviewed:  [X  ] YES     Care Discussed with [X] Consultants  [X  ] Patient  [  ] Family [  ] HCP [  ]   [  ] Social Service  [X  ] RN, [  ] Physical Therapy,[  ] Palliative care team  DVT PPX: [  ] Lovenox, [  ] S C Heparin, [  ] Coumadin, [  ] Xarelto, [  ] Eliquis, [  ] Pradaxa, [  ] IV Heparin drip, [  ] SCD [  ] Contraindication 2 to GI Bleed,[  ] Ambulation [  ] Contraindicated 2 to  bleed [  ] Contraindicated 2 to Brain Bleed  Advanced directive: [  ] None, [  ] DNR/DNI Patient is a 54y old  Male who presents with a chief complaint of Transaminitis (09 Jul 2025 10:43)    HPI:  Pt is a 55 yo M PMHx depression (possible Bipolar I?), HLD, HTN (not on medication), HLD, overactive bladder? presenting to ED for abnormal labs. Pt saw his PCP and got lab work, showing elevated LFTs so was instructed to go to ED. Pt has no acute complaints at present, denies abdominal pain, fever, chills, n/v/d/c.    ED Course:   Vitals: BP: 104/71, HR: 61, Temp: 98.1F, RR: 18, SpO2: 94% on RA   Labs:  WBC 5.32, Hg 14.5, K 3.5, Gluc 118, Tbili 2.2, D bili 1.2, Alk phos 515, AST//1132, lipase 172  CT: Gallbladder is moderately distended. No definite gallstones or gallbladder wall thickening. Increased caliber of the extrahepatic CBD measures up to 1.4 cm, with mild intrahepatic biliary prominence.   Received in the ED: 1000cc NS bolus x1 (02 Jul 2025 22:14)    INTERVAL HPI:  07/03: Pt seen and examined at bedside. Pt endorses an abundance if gas but denies any nausea, vomiting, constipation, diarrhea, abdominal pain, fever, or chills. Has no other complaints at this time. RUQ ultrasound unremarkable; MRCP shows evidence of 6mm distal common bile duct calculus with associated biliary duct dilatation- suggestive of acute cholecystitis. Diet advanced from NPO.  MRCP today ,elevated LFT  7/4: Pt seen ,examined  MRCP + for CBD stone, AC ramiro, on IV Rocephin, LFT's improving ,On PO diet  7/5: Pt seen and examined. Overnight with some RUQ pain, resolved with IV Toradol. Denies any nausea, vomiting, diarrhea or constipation. LFTs rise from yesterday. Remains on PO diet, tolerating well.   7/6: Pt seen and examined. Has no acute complaints at this time and denies nausea, vomiting, diarrhea or constipation. LFT's downtrending today but still high. Pt scheduled for lap ramiro  tomorrow, started NPO and continue holding Lovenox. ERCP pending possibly Tuesday.   7/7: Pt seen and examined. Has no complaints at this time and denies any abdominal pain. Pt going for laparoscopic cholecystectomy today with tentative ERCP tomorrow. NPO after midnight  7/8: Pt seen and examined. POD # 1 Surgical site clean; no discharge, erythema, drainage. WBC and Neutrophils elevated likely reactive 2/2 to surgery. Pt has no acute complaints and denies any chills, nausea, vomiting, dysuria, SOB, CP, dizziness. Pt is able to ambulate. Has not had a BM since surgery but is passing gas, last BM yesterday. ERCP scheduled for today. Pt was on the phone with his brother, plan was discussed with him.   7/9: Pt seen and examined. Remote pulse ox showed desaturation to the 80s this morning while pt was laying down and on 3L NC, Pt was helped up and saturation improved to 95%. Pt complains of no SOB, but had some dizziness while he was being helped up. Pt encouraged to use incentive spirometry. Has no acute complaints at this time and is passing flatus. Pt was weaned off O2 and is saturating at 94% on RA. Surgery cleared for d/c if advanced regular diet is tolerated. Placed on continuous pulse ox for monitoring. No complaints ,WBC improving       OVERNIGHT EVENTS:  Pt desaturated to low 70's via remote pulse ox, was put on venti max. Given 6L O2 NC and was saturating at 94%. Pt had no SOB, dizziness CP, or any other acute symptoms.     Home Medications:  atorvastatin 10 mg oral tablet: 1 tab(s) orally once a day (at bedtime) (02 Jul 2025 22:27)  oxyBUTYnin 10 mg/24 hr oral tablet, extended release: 1 tab(s) orally once a day (02 Jul 2025 22:27)  sertraline 50 mg oral tablet: 1 tab(s) orally once a day (02 Jul 2025 22:27)      MEDICATIONS  (STANDING):  cefTRIAXone   IVPB 1000 milliGRAM(s) IV Intermittent every 24 hours  oxybutynin XL 10 milliGRAM(s) Oral daily  sodium chloride 0.9%. 1000 milliLiter(s) (75 mL/Hr) IV Continuous <Continuous>    MEDICATIONS  (PRN):  acetaminophen     Tablet .. 1000 milliGRAM(s) Oral every 6 hours PRN Temp greater or equal to 38C (100.4F), Mild Pain (1 - 3)  aluminum hydroxide/magnesium hydroxide/simethicone Suspension 30 milliLiter(s) Oral every 4 hours PRN Dyspepsia  melatonin 3 milliGRAM(s) Oral at bedtime PRN Insomnia  ondansetron Injectable 4 milliGRAM(s) IV Push every 8 hours PRN Nausea and/or Vomiting  oxyCODONE    IR 5 milliGRAM(s) Oral every 6 hours PRN Severe Pain (7 - 10)      Allergies    No Known Allergies    Intolerances        Social History:      REVIEW OF SYSTEMS: i feel good, want to go home   CONSTITUTIONAL: No fever, No chills, No fatigue, No myalgia, No Body ache, No Weakness  EYES: No eye pain,  No visual disturbances, No discharge, NO Redness  ENMT:  No ear pain, No nose bleed, No vertigo; No sinus pain, NO throat pain, No Congestion  NECK: No pain, No stiffness  RESPIRATORY: No cough, NO wheezing, No  hemoptysis, NO  shortness of breath  CARDIOVASCULAR: No chest pain, palpitations  GASTROINTESTINAL: No abdominal pain, NO epigastric pain. No nausea, No vomiting; No diarrhea, No constipation. [  ] BM  GENITOURINARY: No dysuria, No frequency, No urgency, No hematuria, NO incontinence  NEUROLOGICAL: No headaches, No dizziness, No numbness, No tingling, No tremors, No weakness  EXT: No Swelling, No Pain, No Edema  SKIN:  [ X ] No itching, burning, rashes, or lesions   MUSCULOSKELETAL: No joint pain ,No Jt swelling; No muscle pain, No back pain, No extremity pain  PSYCHIATRIC: No depression,  No anxiety,  No mood swings ,No difficulty sleeping at night  PAIN SCALE: [ X ] None  [  ] Other-  ROS Unable to obtain due to - [  ] Dementia  [  ] Lethargy [  ] Drowsy [  ] Sedated [  ] non verbal  REST OF REVIEW Of SYSTEM - [X  ] Normal     Vital Signs Last 24 Hrs  T(C): 36.3 (09 Jul 2025 08:23), Max: 37 (08 Jul 2025 12:40)  T(F): 97.3 (09 Jul 2025 08:23), Max: 98.6 (08 Jul 2025 12:40)  HR: 50 (09 Jul 2025 04:18) (50 - 73)  BP: 95/62 (09 Jul 2025 04:18) (86/69 - 125/78)  BP(mean): --  RR: 18 (09 Jul 2025 08:23) (12 - 19)  SpO2: 94% (09 Jul 2025 09:14) (90% - 98%)    Parameters below as of 09 Jul 2025 09:14  Patient On (Oxygen Delivery Method): room air      Finger Stick        07-08 @ 07:01  -  07-09 @ 07:00  --------------------------------------------------------  IN: 550 mL / OUT: 650 mL / NET: -100 mL        PHYSICAL EXAM: OOB to chair  GENERAL:  [X  ] NAD , [X  ] well appearing, [  ] Agitated, [  ] Drowsy,  [  ] Lethargy, [  ] confused   HEAD:  [ X ] Normal, [  ] Other  EYES:  [ X ] EOMI, [X  ] PERRLA, [X  ] conjunctiva and sclera clear normal, [  ] Other,  [  ] Pallor,[  ] Discharge  ENMT:  [X  ] Normal, [X  ] Moist mucous membranes, [ X ] Good dentition, [X  ] No Thrush  NECK:  [ X ] Supple, [  X] No JVD, [ X ] Normal thyroid, [  ] Lymphadenopathy [  ] Other  CHEST/LUNG:  [X  ] Clear to auscultation bilaterally, [ X ] Breath Sounds equal bilaterally mildly Decreased at lower bases, [  ] poor effort  [ X ] No rales, [X  ] No rhonchi  [X  ]  No wheezing,   HEART:  [X  ] Regular rate and rhythm, [  ] tachycardia, [  ] Bradycardia,  [  ] irregular  [ X ] No murmurs, No rubs, No gallops, [  ] PPM in place (Mfr:  )  ABDOMEN: surgical incisions CDI [ X ] Soft, [  X] Nontender, [X  ] Nondistended, [X  ] No mass, [ X ] Bowel sounds present, [ x ] obese wound Sx site D/C/I  NERVOUS SYSTEM:  [ X ] Alert & Oriented X3, [ X ] Nonfocal  [  ] Confusion  [  ] Encephalopathic [  ] Sedated [  ] Unable to assess, [  ] Dementia [  ] Other-  EXTREMITIES: [ X ] 2+ Peripheral Pulses, No clubbing, No cyanosis,  [  ] edema B/L lower EXT. [  ] PVD stasis skin changes B/L Lower EXT, [  ] wound  LYMPH: No lymphadenopathy noted  SKIN:  [X  ] No rashes or lesions, [  ] Pressure Ulcers, [  ] ecchymosis, [  ] Skin Tears, [  ] Other    DIET: Diet, Regular:   Low Fat (LOWFAT) (07-09-25 @ 09:58)      LABS:                        13.7   12.01 )-----------( 340      ( 09 Jul 2025 08:15 )             41.7     09 Jul 2025 08:15    138    |  107    |  8      ----------------------------<  115    3.6     |  23     |  0.41     Ca    8.6        09 Jul 2025 08:15    TPro  7.7    /  Alb  2.9    /  TBili  0.7    /  DBili  x      /  AST  66     /  ALT  352    /  AlkPhos  324    09 Jul 2025 08:15    PT/INR - ( 08 Jul 2025 07:11 )   PT: 12.7 sec;   INR: 1.08 ratio         PTT - ( 08 Jul 2025 07:11 )  PTT:28.6 sec  Urinalysis Basic - ( 09 Jul 2025 08:15 )    Color: x / Appearance: x / SG: x / pH: x  Gluc: 115 mg/dL / Ketone: x  / Bili: x / Urobili: x   Blood: x / Protein: x / Nitrite: x   Leuk Esterase: x / RBC: x / WBC x   Sq Epi: x / Non Sq Epi: x / Bacteria: x          Lipase: 172 U/L (07-02-25 @ 17:50)    RADIOLOGY & ADDITIONAL TESTS:    < from: Xray Chest 1 View- PORTABLE-Urgent (Xray Chest 1 View- PORTABLE-Urgent .) (07.09.25 @ 01:50) >    INTERPRETATION:  AP chest on July 9, 2025 at 1:37 AM. Patient had   laparoscopic cholecystectomy.    Heart magnified by technique.    There is an increasing left base infiltrate compared to July 6.    IMPRESSION: Increasing left base infiltrate.    < end of copied text >    HEALTH ISSUES - PROBLEM Dx:  Transaminitis    Need for prophylactic measure    Anxiety and depression    HLD (hyperlipidemia)    HTN (hypertension)    OAB (overactive bladder)    Acute cholecystitis            Consultant(s) Notes Reviewed:  [X  ] YES     Care Discussed with [X] Consultants  [X  ] Patient  [  ] Family [  ] HCP [  ]   [  ] Social Service  [X  ] RN, [  ] Physical Therapy,[  ] Palliative care team  DVT PPX: [x  ] Lovenox, [  ] S C Heparin, [  ] Coumadin, [  ] Xarelto, [  ] Eliquis, [  ] Pradaxa, [  ] IV Heparin drip, [  ] SCD [  ] Contraindication 2 to GI Bleed,[  ] Ambulation [  ] Contraindicated 2 to  bleed [  ] Contraindicated 2 to Brain Bleed  Advanced directive: [x ] None, [  ] DNR/DNI

## 2025-07-09 NOTE — PROGRESS NOTE ADULT - NSPROGADDITIONALINFOA_GEN_ALL_CORE
I reviewed the overnight course of events on the unit, re-confirming the patient history. I discussed the care with the patient and their family  The plan of care was discussed with the physician assistant and modifications were made to the notation where appropriate.   Differential diagnosis and plan of care discussed with patient after the evaluation  35 minutes spent on total encounter of which more than fifty percent of the encounter was spent counseling and/or coordinating care by the attending physician.  Advanced care planning was discussed with patient and family.  Advanced care planning forms were reviewed and discussed.  Risks, benefits and alternatives of gastroenterologic procedures were discussed in detail and all questions were answered.
I reviewed the overnight course of events on the unit, re-confirming the patient history. I discussed the care with the patient and their family  The plan of care was discussed with the physician assistant and modifications were made to the notation where appropriate.   Differential diagnosis and plan of care discussed with patient after the evaluation.  35 minutes spent on total encounter of which more than fifty percent of the encounter was spent counseling and/or coordinating care by the attending physician.  Advanced care planning was discussed with patient and family.  Advanced care planning forms were reviewed and discussed.  Risks, benefits and alternatives of gastroenterologic procedures were discussed in detail and all questions were answered.
agree with above unless contradicts below  for poss ERCP next week  will consider cholecystectomy following
agree with above unless contradicts below  cleared for d/c
agree with above unless contradicts below  doing well  for [facility] sometime this week
agree with above unless contradicts below  no pain  for MRCP  GI consult
agree with above unless contradicts below  slight discomfort overnight  resolved  tbil up from yesterday  tentative plan for ERCP 7.7, GB 7.8

## 2025-07-09 NOTE — DIETITIAN INITIAL EVALUATION ADULT - OTHER INFO
"Pt is a 55 yo M PMHx depression , HLD, HTN (not on medication), HLD, overactive bladder?  presenting  to ED for abnormal labs. Admitted for transaminitis."  pt s/p MRCP -> +stones.   s/p  lap ramiro 7/7 and ERCP with stent placement 7/8.

## 2025-07-09 NOTE — PROGRESS NOTE ADULT - ATTENDING COMMENTS
Pt is a 55 yo M PMHx depression , HLD, HTN (not on medication), HLD, overactive bladder?  presenting  to ED for abnormal labs. Admitted for transaminitis.  Pt seen Examined, case & care plan d/w pt residents at detail.  Consult-  GI-Dr Solorio  +-MRCP AC ramiro, + Distal CBD stone , IV ABx , pt needs to stay for ERCP likely Monday   Sx Dr rodas -D/W SX PA AC ramiro - plan for OR possible Monday   -PO diet   -DVT ppx   AM labs  Total care time is 60 minutes.
Pt is a 55 yo M PMHx depression , HLD, HTN (not on medication), HLD, overactive bladder?  presenting  to ED for abnormal labs. Admitted for transaminitis.  Pt seen Examined, case & care plan d/w pt residents at detail.  Consult-  GI-Dr Solorio  +-MRCP AC ramiro, + Distal CBD stone , IV ABx , pt needs to stay for ERCP likely Monday   Sx Dr rodas -D/W SX PA AC ramiro - plan for OR possible Monday   -PO diet   -DVT ppx   AM labs  Total care time is 60 minutes.
Pt is a 53 yo M PMHx depression , HLD, HTN (not on medication), HLD, overactive bladder?  presenting  to ED for abnormal labs. Admitted for transaminitis.  Pt seen Examined, case & care plan d/w pt residents at detail.  Consult-  GI-Dr Solorio  +-MRCP AC ramiro, + Distal CBD stone , IV ABx , pt needs to stay for ERCP likely Tuesday  Sx Dr rodas -D/W SX PA AC ramiro - plan for OR possible Monday   Pt is medically optimized for schedule OR for Lap Ramiro on Monday 7/6, Moderate risk Sx   -NPO after midnight  -Pre op IV Abx as per surgeon  Post OP DVT ppx as per Sx team  Post op Incentive spirometry   -PO diet   -DVT ppx   AM labs  Total care time is 60 minutes.
Pt is a 55 yo M PMHx depression , HLD, HTN (not on medication), HLD, overactive bladder?  presenting  to ED for abnormal labs. Admitted for transaminitis.  Pt seen Examined, case & care plan d/w pt residents at detail.  Consult-  GI-Dr Turcios +-MRCP AC ramiro, + Distal CBD stone , IV ABx , pt needs to stay for ERCP  Sx Dr rodas -D/W SX PA about MRCP results   -Po diet   NPO after Mid night   DVT ppx   AM labs  Total care time is 60 minutes.
Pt is a 53 yo M PMHx depression , HLD, HTN (not on medication), HLD, overactive bladder?  presenting  to ED for abnormal labs. Admitted for transaminitis.  Pt seen Examined, case & care plan d/w pt residents at detail.  Consult-  GI-Dr Solorio  +-MRCP AC ramiro, + Distal CBD stone , IV ABx , pt needs to stay for ERCP likely Tuesday  Sx Dr rodas -D/W SX PA AC ramiro - plan for OR possible Monday   Pt is medically optimized for schedule OR for Lap Ramiro on Monday 7/6, Moderate risk Sx   -NPO after midnight  -Pre op IV Abx as per surgeon  Post OP DVT ppx as per Sx team  Post op Incentive spirometry   -PO diet   -DVT ppx   AM labs  Follow CXR & EKG-pre op  Total care time is 60 minutes.
Pt is a 53 yo M PMHx depression , HLD, HTN (not on medication), HLD, overactive bladder?  presenting  to ED for abnormal labs. Admitted for transaminitis.  Pt seen Examined, case & care plan d/w pt residents at detail.  Consult-  GI-Dr Solorio  +-MRCP AC ramiro, + Distal CBD stone , IV ABx , pt for ERCP today   Sx Dr rodas -team follow up S/P Lap  ramiro -POD # 1   -NPO after midnight  -Pre op IV Abx as per surgeon  Post OP DVT ppx as per GI -HOLD Lovenox today  Post op Incentive spirometry   - resume PO diet post ERCP  -DVT ppx   AM labs  Total care time is 60 minutes.
Pt is a 55 yo M PMHx depression , HLD, HTN (not on medication), HLD, overactive bladder?  presenting  to ED for abnormal labs. Admitted for transaminitis.  Pt seen Examined, case & care plan d/w pt residents at detail.  Consult-  GI-Dr Turcios d/w -stable for d/c home today-out pt follow up 2-4 weeks for PD stent removal  Sx Dr rodas -team follow up S/P Lap  ramiro -POD # 2, stable for d/c -out pt follow up in 2 weeks    Post OP DVT ppx   Post op Incentive spirometry   PO diet   D/C Home today   Total  d/c care time is 60 minutes.

## 2025-07-09 NOTE — DIETITIAN INITIAL EVALUATION ADULT - PROBLEM SELECTOR PLAN 1
Pt sent to ED by PCP for transaminitis-elevated LFI's  - Afebrile, no leukocytosis. Denies abdominal pain at present  - Elevated Tbili 2.2, D bili 1.2, Alk phos 515, AST//1132, lipase 172  - CT A/P with IV contrast: gallbladder is moderately distended. No definite gallstones or gallbladder wall thickening. Increased caliber of the extrahepatic CBD measures up to 1.4 cm, with mild intrahepatic biliary prominence.   - Admit to medicine for MRCP A?P   - NPO for procedure  - s/p 1000cc bolus x1 in ED  - c/w mIVF @ 75cc/hr while NPO  - f/u RUQ US  - f/u acute hepatitis panel  - Trend LFTs  - Hold home statin  - Avoid hepatotoxic meds  - GI (Dr. Solorio) consulted, recs appreciated- plan for MRCP  - Surgery (Dr. Johnson)  consulted, recs appreciated- f/u RUQ US

## 2025-07-09 NOTE — DIETITIAN INITIAL EVALUATION ADULT - PERTINENT MEDS FT
MEDICATIONS  (STANDING):  cefTRIAXone   IVPB 1000 milliGRAM(s) IV Intermittent every 24 hours  oxybutynin XL 10 milliGRAM(s) Oral daily  sodium chloride 0.9%. 1000 milliLiter(s) (75 mL/Hr) IV Continuous <Continuous>    MEDICATIONS  (PRN):  acetaminophen     Tablet .. 1000 milliGRAM(s) Oral every 6 hours PRN Temp greater or equal to 38C (100.4F), Mild Pain (1 - 3)  aluminum hydroxide/magnesium hydroxide/simethicone Suspension 30 milliLiter(s) Oral every 4 hours PRN Dyspepsia  melatonin 3 milliGRAM(s) Oral at bedtime PRN Insomnia  ondansetron Injectable 4 milliGRAM(s) IV Push every 8 hours PRN Nausea and/or Vomiting  oxyCODONE    IR 5 milliGRAM(s) Oral every 6 hours PRN Severe Pain (7 - 10)

## 2025-07-09 NOTE — DIETITIAN INITIAL EVALUATION ADULT - PERTINENT LABORATORY DATA
07-09    138  |  107  |  8   ----------------------------<  115[H]  3.6   |  23  |  0.41[L]    Ca    8.6      09 Jul 2025 08:15    TPro  7.7  /  Alb  2.9[L]  /  TBili  0.7  /  DBili  x   /  AST  66[H]  /  ALT  352[H]  /  AlkPhos  324[H]  07-09

## 2025-07-09 NOTE — PROVIDER CONTACT NOTE (OTHER) - SITUATION
blood pressure running in the 90's and oxygen saturation low 70's
Following up on patient low blood pressure

## 2025-07-09 NOTE — DISCHARGE NOTE NURSING/CASE MANAGEMENT/SOCIAL WORK - PATIENT PORTAL LINK FT
You can access the FollowMyHealth Patient Portal offered by [facility] by registering at the following website: [email or web address]. By joining [facility]’s FollowMyHealth portal, you will also be able to view your health information using other applications (apps) compatible with our system.

## 2025-07-09 NOTE — DISCHARGE NOTE NURSING/CASE MANAGEMENT/SOCIAL WORK - FINANCIAL ASSISTANCE
[facility] provides services at a reduced cost to those who are determined to be eligible through [facility]’s financial assistance program. Information regarding [facility]’s financial assistance program can be found by going to [email or web address] or by calling [phone].

## 2025-07-09 NOTE — PROGRESS NOTE ADULT - PROBLEM SELECTOR PLAN 2
transaminitis-elevated LFT's -CBD stone obstruction  - Afebrile, no leukocytosis.  - CT A/P with IV contrast: gallbladder is moderately distended. No definite gallstones or gallbladder wall thickening. Increased caliber of the extrahepatic CBD measures up to 1.4 cm, with mild intrahepatic biliary prominence.   - RUQ US WNL  - MRCP 6 mm distal common bile duct calculus with associated biliary duct dilatation AC ramiro   - AM  LFTs  - Rocephin 1g QD   - Hold home statin  - Surgery completed (Dr. Johnson)  OR on 7/7 for Lap Ramiro  - GI (Dr. Turcios  ERCP 7/8 ,On  IV Abx transaminitis-elevated LFT's -CBD stone obstruction  - Afebrile, no leukocytosis.  - CT A/P with IV contrast: gallbladder is moderately distended. No definite gallstones or gallbladder wall thickening. Increased caliber of the extrahepatic CBD measures up to 1.4 cm, with mild intrahepatic biliary prominence.   - RUQ US WNL  - MRCP 6 mm distal common bile duct calculus with associated biliary duct dilatation AC ramiro   - AM  LFTs  - Rocephin 1g QD   - Hold home statin  - Surgery completed (Dr. Johnson)  OR on 7/7 for Lap Ramiro  - GI (Dr. Turcios)  ERCP 7/8 ,On  IV Abx transaminitis-elevated LFT's -CBD stone obstruction  - Afebrile, no leukocytosis.  - CT A/P with IV contrast: gallbladder is moderately distended. No definite gallstones or gallbladder wall thickening. Increased caliber of the extrahepatic CBD measures up to 1.4 cm, with mild intrahepatic biliary prominence.   - RUQ US WNL  - MRCP 6 mm distal common bile duct calculus with associated biliary duct dilatation AC ramiro   - AM  LFTs  - Rocephin 1g QD   - Hold home statin  - Surgery completed (Dr. Johnson)  OR on 7/7 for Lap Ramiro  - GI (Dr. Turcios)  ERCP 7/8 ,On  IV Abx-NO CBD stone -S/P PD stent placed --> Remove in 2-4 weeks as per GI

## 2025-07-09 NOTE — PROGRESS NOTE ADULT - ASSESSMENT
elevated lfts    7/3 MRCP: 6 mm distal common bile duct calculus with associated biliary duct dilatation.  7/7 s/p robotic cholecystectomy  7/8 s/p ERCP difficult cannulation, PD stent required, no obvious stone was seen    Plan:  - Pt has no abdominal pain today, advance diet to regular  - LFTs noted, trending down  - If tolerating diet no GI objection to begin d/c plan  - Upper gastrointestinal endoscopy as outpatient for PD stent removal in 2-4 weeks

## 2025-07-09 NOTE — PROGRESS NOTE ADULT - PROBLEM SELECTOR PLAN 4
Chronic  - c/w home zoloft  - Possible Bipolar I disorder, pt hospitalized at [facility] for psychotic episode- discharged on Risperdal 3mg qhs, Depakote DR 500mg BID, Klonopin to 0.5mg qhs. Per pt no longer taking

## 2025-07-09 NOTE — PROGRESS NOTE ADULT - SUBJECTIVE AND OBJECTIVE BOX
SURGERY PA NOTE ON BEHALF OF DR. Johnson/  SURGERY:    S: Patient seen and examined at bedside.   Pt noted with decreased O2 sats overnight requiring supplemental O2. Also with relative hypotension for which he states he did feel dizzy with ambulation. Pain is maangeable. Passing flatus and had BM this am. Garo. clear liquids. Denies any CP, SOB, N/V, fever or chills.    MEDICATIONS:  acetaminophen     Tablet .. 1000 milliGRAM(s) Oral every 6 hours PRN  aluminum hydroxide/magnesium hydroxide/simethicone Suspension 30 milliLiter(s) Oral every 4 hours PRN  cefTRIAXone   IVPB 1000 milliGRAM(s) IV Intermittent every 24 hours  melatonin 3 milliGRAM(s) Oral at bedtime PRN  ondansetron Injectable 4 milliGRAM(s) IV Push every 8 hours PRN  oxybutynin XL 10 milliGRAM(s) Oral daily  oxyCODONE    IR 5 milliGRAM(s) Oral every 6 hours PRN  sodium chloride 0.9%. 1000 milliLiter(s) IV Continuous <Continuous>      O:  Vital Signs Last 24 Hrs  T(C): 36.3 (09 Jul 2025 08:23), Max: 37 (08 Jul 2025 12:40)  T(F): 97.3 (09 Jul 2025 08:23), Max: 98.6 (08 Jul 2025 12:40)  HR: 50 (09 Jul 2025 04:18) (50 - 73)  BP: 95/62 (09 Jul 2025 04:18) (86/69 - 125/78)  BP(mean): --  RR: 18 (09 Jul 2025 08:23) (12 - 19)  SpO2: 93% (09 Jul 2025 08:23) (90% - 98%)    Parameters below as of 09 Jul 2025 08:23  Patient On (Oxygen Delivery Method): nasal cannula  O2 Flow (L/min): 3      I&O SUMMARY:    07-08-25 @ 07:01  -  07-09-25 @ 07:00  --------------------------------------------------------  IN: 550 mL / OUT: 650 mL / NET: -100 mL        PHYSICAL EXAM:  Lungs: CTA bilat diminished at bases  Card: S1S2  Abd: Softly distended. appropriately tender.  No rebound/guarding.  Periumbilical ecchymosis, otherwise incisions CDI  Ext: Calves soft, NT, with tr edema bilat    LABS:                        13.7   12.01 )-----------( 340      ( 09 Jul 2025 08:15 )             41.7     07-08    137  |  106  |  8   ----------------------------<  110[H]  3.6   |  25  |  0.66    Ca    8.9      08 Jul 2025 07:11    TPro  7.0  /  Alb  2.8[L]  /  TBili  0.9  /  DBili  x   /  AST  115[H]  /  ALT  438[H]  /  AlkPhos  374[H]  07-08    PT/INR - ( 08 Jul 2025 07:11 )   PT: 12.7 sec;   INR: 1.08 ratio         PTT - ( 08 Jul 2025 07:11 )  PTT:28.6 sec          RADIOLOGY:

## 2025-07-09 NOTE — PROGRESS NOTE ADULT - REASON FOR ADMISSION
Transaminitis

## 2025-07-09 NOTE — DIETITIAN INITIAL EVALUATION ADULT - ORAL INTAKE PTA/DIET HISTORY
Diet hx reveals frequent intake of fast foods, fried foods, soda.  Pt states he will now have more water and cut back on fast food.  Pt wt in 2018 noted to be 167# per EMR.

## 2025-07-09 NOTE — CHART NOTE - NSCHARTNOTEFT_GEN_A_CORE
Nurse alerted me that pt had an O2 on sat of 76% on RA.  Pt denied shortness of breath and was put on the venti mask. On arrival, unclear if there was an issue with the pulse ox has he began satting 94% on 6L NC. Lungs unremarkable to auscultation and heart sounds normal. No signs of fluid overload.

## 2025-07-09 NOTE — PROGRESS NOTE ADULT - PROBLEM SELECTOR PLAN 7
Possible OAB, sees uro and prescribed oxybutynin   - c/w home oxybutynin
Lovenox 40 mg SC daily  Held for procedures

## 2025-07-09 NOTE — PROGRESS NOTE ADULT - PROBLEM SELECTOR PLAN 1
-MRCP 6 mm distal common bile duct calculus with associated biliary duct dilatation AC ramiro   -IV Rocephin daily  S/P Robotic Lap Ramiro by Dr Johnson   Sx follow up  Pain meds as per Sx team  Incentive spirometer   ambulation  LFT improving -MRCP 6 mm distal common bile duct calculus with associated biliary duct dilatation AC ramiro   -IV Rocephin daily  S/P Robotic Lap Ramiro by Dr Johnson   Sx follow up  Pain meds as per Sx team  Incentive spirometer   ambulation  LFT improving  S/p ERCP w stent placement -MRCP 6 mm distal common bile duct calculus with associated biliary duct dilatation AC ramiro   -IV Rocephin daily POD # 2   S/P Robotic Lap Ramiro by Dr Johnson   Sx follow up-d/c planning -LOW fat diet  Pain meds as per Sx team  Incentive spirometer   ambulation  LFT improving-repeat out pt   S/p ERCP w stent placement

## 2025-07-09 NOTE — PROGRESS NOTE ADULT - ASSESSMENT
54y Male POD#2 s/p robo-assisted cholecystectomy. S/P ERCP with stent POD#1  Doing well  No abd pain  Tolerating CLD    Advance diet as per GI  OOB/Ambulate  Encourage IS/wean O2 as tolerated  F/U am labs  care per primary team   To be discussed with Dr. Johnson

## 2025-07-09 NOTE — DIETITIAN INITIAL EVALUATION ADULT - PROBLEM SELECTOR PLAN 2
Chronic  - c/w home zoloft  - Possible Bipolar I disorder, pt hospitalized at [facility] for psychotic episode- discharged on Risperdal 3mg qhs, Depakote DR 500mg BID, Klonopin to 0.5mg qhs. Per pt no longer taking

## 2025-07-10 RX ORDER — CEFUROXIME SODIUM 1.5 G
1 VIAL (EA) INJECTION
Qty: 10 | Refills: 0
Start: 2025-07-10 | End: 2025-07-14

## 2025-08-18 ENCOUNTER — EMERGENCY (EMERGENCY)
Facility: HOSPITAL | Age: 55
LOS: 1 days | End: 2025-08-18
Attending: EMERGENCY MEDICINE
Payer: MEDICAID

## 2025-08-18 VITALS
HEIGHT: 63 IN | OXYGEN SATURATION: 97 % | WEIGHT: 179.9 LBS | DIASTOLIC BLOOD PRESSURE: 86 MMHG | RESPIRATION RATE: 18 BRPM | TEMPERATURE: 97 F | SYSTOLIC BLOOD PRESSURE: 124 MMHG | HEART RATE: 89 BPM

## 2025-08-18 VITALS
HEART RATE: 84 BPM | SYSTOLIC BLOOD PRESSURE: 129 MMHG | DIASTOLIC BLOOD PRESSURE: 84 MMHG | RESPIRATION RATE: 16 BRPM | TEMPERATURE: 98 F | OXYGEN SATURATION: 99 %

## 2025-08-18 DIAGNOSIS — Z98.890 OTHER SPECIFIED POSTPROCEDURAL STATES: Chronic | ICD-10-CM

## 2025-08-18 LAB
ALBUMIN SERPL ELPH-MCNC: 3.4 G/DL — SIGNIFICANT CHANGE UP (ref 3.3–5)
ALP SERPL-CCNC: 111 U/L — SIGNIFICANT CHANGE UP (ref 40–120)
ALT FLD-CCNC: 46 U/L — SIGNIFICANT CHANGE UP (ref 12–78)
ANION GAP SERPL CALC-SCNC: 6 MMOL/L — SIGNIFICANT CHANGE UP (ref 5–17)
AST SERPL-CCNC: 44 U/L — HIGH (ref 15–37)
BASOPHILS # BLD AUTO: 0.03 K/UL — SIGNIFICANT CHANGE UP (ref 0–0.2)
BASOPHILS NFR BLD AUTO: 0.3 % — SIGNIFICANT CHANGE UP (ref 0–2)
BILIRUB SERPL-MCNC: 1.3 MG/DL — HIGH (ref 0.2–1.2)
BUN SERPL-MCNC: 7 MG/DL — SIGNIFICANT CHANGE UP (ref 7–23)
CALCIUM SERPL-MCNC: 8.8 MG/DL — SIGNIFICANT CHANGE UP (ref 8.5–10.1)
CHLORIDE SERPL-SCNC: 103 MMOL/L — SIGNIFICANT CHANGE UP (ref 96–108)
CO2 SERPL-SCNC: 26 MMOL/L — SIGNIFICANT CHANGE UP (ref 22–31)
CREAT SERPL-MCNC: 0.67 MG/DL — SIGNIFICANT CHANGE UP (ref 0.5–1.3)
EGFR: 112 ML/MIN/1.73M2 — SIGNIFICANT CHANGE UP
EGFR: 112 ML/MIN/1.73M2 — SIGNIFICANT CHANGE UP
EOSINOPHIL # BLD AUTO: 0.12 K/UL — SIGNIFICANT CHANGE UP (ref 0–0.5)
EOSINOPHIL NFR BLD AUTO: 1.2 % — SIGNIFICANT CHANGE UP (ref 0–6)
GLUCOSE SERPL-MCNC: 116 MG/DL — HIGH (ref 70–99)
HCT VFR BLD CALC: 41.2 % — SIGNIFICANT CHANGE UP (ref 39–50)
HGB BLD-MCNC: 14.2 G/DL — SIGNIFICANT CHANGE UP (ref 13–17)
IMM GRANULOCYTES # BLD AUTO: 0.03 K/UL — SIGNIFICANT CHANGE UP (ref 0–0.07)
IMM GRANULOCYTES NFR BLD AUTO: 0.3 % — SIGNIFICANT CHANGE UP (ref 0–0.9)
LYMPHOCYTES # BLD AUTO: 1.15 K/UL — SIGNIFICANT CHANGE UP (ref 1–3.3)
LYMPHOCYTES NFR BLD AUTO: 11.3 % — LOW (ref 13–44)
MCHC RBC-ENTMCNC: 30 PG — SIGNIFICANT CHANGE UP (ref 27–34)
MCHC RBC-ENTMCNC: 34.5 G/DL — SIGNIFICANT CHANGE UP (ref 32–36)
MCV RBC AUTO: 86.9 FL — SIGNIFICANT CHANGE UP (ref 80–100)
MONOCYTES # BLD AUTO: 0.81 K/UL — SIGNIFICANT CHANGE UP (ref 0–0.9)
MONOCYTES NFR BLD AUTO: 8 % — SIGNIFICANT CHANGE UP (ref 2–14)
NEUTROPHILS # BLD AUTO: 8.02 K/UL — HIGH (ref 1.8–7.4)
NEUTROPHILS NFR BLD AUTO: 78.9 % — HIGH (ref 43–77)
NRBC # BLD AUTO: 0 K/UL — SIGNIFICANT CHANGE UP (ref 0–0)
NRBC # FLD: 0 K/UL — SIGNIFICANT CHANGE UP (ref 0–0)
NRBC BLD AUTO-RTO: 0 /100 WBCS — SIGNIFICANT CHANGE UP (ref 0–0)
PLATELET # BLD AUTO: 300 K/UL — SIGNIFICANT CHANGE UP (ref 150–400)
PMV BLD: 8.8 FL — SIGNIFICANT CHANGE UP (ref 7–13)
POTASSIUM SERPL-MCNC: 3 MMOL/L — LOW (ref 3.5–5.3)
POTASSIUM SERPL-SCNC: 3 MMOL/L — LOW (ref 3.5–5.3)
PROT SERPL-MCNC: 7.6 G/DL — SIGNIFICANT CHANGE UP (ref 6–8.3)
RBC # BLD: 4.74 M/UL — SIGNIFICANT CHANGE UP (ref 4.2–5.8)
RBC # FLD: 13.6 % — SIGNIFICANT CHANGE UP (ref 10.3–14.5)
SODIUM SERPL-SCNC: 135 MMOL/L — SIGNIFICANT CHANGE UP (ref 135–145)
WBC # BLD: 10.16 K/UL — SIGNIFICANT CHANGE UP (ref 3.8–10.5)
WBC # FLD AUTO: 10.16 K/UL — SIGNIFICANT CHANGE UP (ref 3.8–10.5)

## 2025-08-18 PROCEDURE — 85025 COMPLETE CBC W/AUTO DIFF WBC: CPT

## 2025-08-18 PROCEDURE — 80053 COMPREHEN METABOLIC PANEL: CPT

## 2025-08-18 PROCEDURE — 70487 CT MAXILLOFACIAL W/DYE: CPT | Mod: 26

## 2025-08-18 PROCEDURE — 36415 COLL VENOUS BLD VENIPUNCTURE: CPT

## 2025-08-18 PROCEDURE — 96374 THER/PROPH/DIAG INJ IV PUSH: CPT | Mod: XU

## 2025-08-18 PROCEDURE — 99284 EMERGENCY DEPT VISIT MOD MDM: CPT | Mod: 25

## 2025-08-18 PROCEDURE — 96375 TX/PRO/DX INJ NEW DRUG ADDON: CPT | Mod: XU

## 2025-08-18 PROCEDURE — 70487 CT MAXILLOFACIAL W/DYE: CPT

## 2025-08-18 PROCEDURE — 99285 EMERGENCY DEPT VISIT HI MDM: CPT | Mod: 25

## 2025-08-18 RX ORDER — AMOXICILLIN AND CLAVULANATE POTASSIUM 500; 125 MG/1; MG/1
1 TABLET, FILM COATED ORAL
Qty: 20 | Refills: 0
Start: 2025-08-18 | End: 2025-08-27

## 2025-08-18 RX ORDER — AMPICILLIN SODIUM AND SULBACTAM SODIUM 1; .5 G/1; G/1
1.5 INJECTION, POWDER, FOR SOLUTION INTRAMUSCULAR; INTRAVENOUS ONCE
Refills: 0 | Status: COMPLETED | OUTPATIENT
Start: 2025-08-18 | End: 2025-08-18

## 2025-08-18 RX ADMIN — AMPICILLIN SODIUM AND SULBACTAM SODIUM 100 GRAM(S): 1; .5 INJECTION, POWDER, FOR SOLUTION INTRAMUSCULAR; INTRAVENOUS at 01:58

## 2025-08-18 RX ADMIN — Medication 4 MILLIGRAM(S): at 01:58

## 2025-08-18 RX ADMIN — Medication 1000 MILLILITER(S): at 01:58

## (undated) DEVICE — D HELP - CLEARVIEW CLEARIFY SYSTEM

## (undated) DEVICE — SOL IRR BAG NS 0.9% 1000ML

## (undated) DEVICE — DRSG CURITY GAUZE SPONGE 4 X 4" 12-PLY

## (undated) DEVICE — BLADE SCALPEL SAFETY #15 WITH PLASTIC GREEN HANDLE

## (undated) DEVICE — Device

## (undated) DEVICE — SMOKE EVACUTATION SYS LAPROSCOPIC AC/PA

## (undated) DEVICE — DRAPE TOWEL BLUE 17" X 24"

## (undated) DEVICE — BLADE SCALPEL SAFETYLOCK #15

## (undated) DEVICE — PLV-SCD MACHINE: Type: DURABLE MEDICAL EQUIPMENT

## (undated) DEVICE — ELCTR BOVIE TIP BLADE INSULATED 2.75" EDGE

## (undated) DEVICE — SOL IRR POUR H2O 1000ML

## (undated) DEVICE — ENDOCATCH 10MM

## (undated) DEVICE — ELCTR GROUNDING PAD ADULT COVIDIEN

## (undated) DEVICE — LOK DVC RX AND BIOPSY

## (undated) DEVICE — POSITIONER FOAM SLOTTED HEAD CRADLE (PINK)

## (undated) DEVICE — TUBING STRYKER PNEUMOCLEAR HIGH FLOW

## (undated) DEVICE — NDL HYPO REGULAR BEVEL 25G X 1.5" (BLUE)

## (undated) DEVICE — XI 12MM AND STAPLER CANNULA SEAL

## (undated) DEVICE — VENODYNE/SCD SLEEVE CALF LARGE

## (undated) DEVICE — WARMING BLANKET UPPER ADULT

## (undated) DEVICE — SUT POLYSORB 0 30" GU-45

## (undated) DEVICE — DRSG DERMABOND 0.7ML

## (undated) DEVICE — WARMING BLANKET LOWER ADULT

## (undated) DEVICE — XI CORD BIPOLAR CAUTERY (BLUE)

## (undated) DEVICE — PLV/PSP-ESU FORCE2 FIL 16736T: Type: DURABLE MEDICAL EQUIPMENT

## (undated) DEVICE — DRAPE 3/4 SHEET W REINFORCEMENT 56X77"

## (undated) DEVICE — XI ENDOWRIST SUCTION IRRIGATOR 8MM

## (undated) DEVICE — XI OBTURATOR OPTICAL BLADELESS 8MM

## (undated) DEVICE — XI CORD MONOPOLAR CAUTERY (GREEN)

## (undated) DEVICE — VENODYNE/SCD SLEEVE CALF MEDIUM

## (undated) DEVICE — XI CANNULA SEAL 5MM - 8 MM

## (undated) DEVICE — SUT MONOCRYL 4-0 27" PS-2 UNDYED

## (undated) DEVICE — SYR LUER SLIP TIP 30CC

## (undated) DEVICE — PACK GENERAL LAPAROSCOPY

## (undated) DEVICE — INJ SYS RAP REFIL

## (undated) DEVICE — TUBING SUCTION 20FT

## (undated) DEVICE — PLASTIC SOLUTION BOWL 160Z

## (undated) DEVICE — STAPLER SKIN PROXIMATE

## (undated) DEVICE — XI ENDOWRIST 12 - 8 MM CANNULA REDUCER

## (undated) DEVICE — BRUSH CYTO RAP EXCHG 3MM

## (undated) DEVICE — SOL IRR POUR NS 0.9% 1000ML

## (undated) DEVICE — SNARE POLYP SENS 27MM 240CM